# Patient Record
Sex: FEMALE | Race: WHITE | NOT HISPANIC OR LATINO | Employment: OTHER | URBAN - METROPOLITAN AREA
[De-identification: names, ages, dates, MRNs, and addresses within clinical notes are randomized per-mention and may not be internally consistent; named-entity substitution may affect disease eponyms.]

---

## 2023-07-19 ENCOUNTER — APPOINTMENT (OUTPATIENT)
Dept: RADIOLOGY | Facility: MEDICAL CENTER | Age: 68
DRG: 897 | End: 2023-07-19
Attending: EMERGENCY MEDICINE
Payer: OTHER MISCELLANEOUS

## 2023-07-19 ENCOUNTER — HOSPITAL ENCOUNTER (INPATIENT)
Facility: MEDICAL CENTER | Age: 68
LOS: 7 days | DRG: 897 | End: 2023-07-26
Attending: EMERGENCY MEDICINE | Admitting: INTERNAL MEDICINE
Payer: OTHER MISCELLANEOUS

## 2023-07-19 DIAGNOSIS — T39.1X1A TOXIC EFFECT OF ACETAMINOPHEN, ACCIDENTAL OR UNINTENTIONAL, INITIAL ENCOUNTER: ICD-10-CM

## 2023-07-19 DIAGNOSIS — F10.221: ICD-10-CM

## 2023-07-19 DIAGNOSIS — M53.3 COCCYX PAIN: ICD-10-CM

## 2023-07-19 LAB
ALBUMIN SERPL BCP-MCNC: 4.8 G/DL (ref 3.2–4.9)
ALBUMIN/GLOB SERPL: 1.6 G/DL
ALP SERPL-CCNC: 167 U/L (ref 30–99)
ALT SERPL-CCNC: 66 U/L (ref 2–50)
ANION GAP SERPL CALC-SCNC: 17 MMOL/L (ref 7–16)
APAP SERPL-MCNC: <5 UG/ML (ref 10–30)
AST SERPL-CCNC: 150 U/L (ref 12–45)
BASOPHILS # BLD AUTO: 2.4 % (ref 0–1.8)
BASOPHILS # BLD: 0.11 K/UL (ref 0–0.12)
BILIRUB SERPL-MCNC: 0.7 MG/DL (ref 0.1–1.5)
BUN SERPL-MCNC: 12 MG/DL (ref 8–22)
CALCIUM ALBUM COR SERPL-MCNC: 9.6 MG/DL (ref 8.5–10.5)
CALCIUM SERPL-MCNC: 10.2 MG/DL (ref 8.4–10.2)
CHLORIDE SERPL-SCNC: 99 MMOL/L (ref 96–112)
CO2 SERPL-SCNC: 24 MMOL/L (ref 20–33)
CREAT SERPL-MCNC: 0.93 MG/DL (ref 0.5–1.4)
EKG IMPRESSION: NORMAL
EKG IMPRESSION: NORMAL
EOSINOPHIL # BLD AUTO: 0.18 K/UL (ref 0–0.51)
EOSINOPHIL NFR BLD: 3.9 % (ref 0–6.9)
ERYTHROCYTE [DISTWIDTH] IN BLOOD BY AUTOMATED COUNT: 49.5 FL (ref 35.9–50)
GFR SERPLBLD CREATININE-BSD FMLA CKD-EPI: 68 ML/MIN/1.73 M 2
GLOBULIN SER CALC-MCNC: 3 G/DL (ref 1.9–3.5)
GLUCOSE SERPL-MCNC: 104 MG/DL (ref 65–99)
HCT VFR BLD AUTO: 41.1 % (ref 37–47)
HGB BLD-MCNC: 13.6 G/DL (ref 12–16)
IMM GRANULOCYTES # BLD AUTO: 0.01 K/UL (ref 0–0.11)
IMM GRANULOCYTES NFR BLD AUTO: 0.2 % (ref 0–0.9)
INR PPP: 0.89 (ref 0.87–1.13)
LYMPHOCYTES # BLD AUTO: 1.52 K/UL (ref 1–4.8)
LYMPHOCYTES NFR BLD: 33 % (ref 22–41)
MCH RBC QN AUTO: 32.2 PG (ref 27–33)
MCHC RBC AUTO-ENTMCNC: 33.1 G/DL (ref 32.2–35.5)
MCV RBC AUTO: 97.2 FL (ref 81.4–97.8)
MONOCYTES # BLD AUTO: 0.49 K/UL (ref 0–0.85)
MONOCYTES NFR BLD AUTO: 10.7 % (ref 0–13.4)
NEUTROPHILS # BLD AUTO: 2.29 K/UL (ref 1.82–7.42)
NEUTROPHILS NFR BLD: 49.8 % (ref 44–72)
NRBC # BLD AUTO: 0 K/UL
NRBC BLD-RTO: 0 /100 WBC (ref 0–0.2)
PLATELET # BLD AUTO: 337 K/UL (ref 164–446)
PMV BLD AUTO: 10.2 FL (ref 9–12.9)
POTASSIUM SERPL-SCNC: 3.6 MMOL/L (ref 3.6–5.5)
PROT SERPL-MCNC: 7.8 G/DL (ref 6–8.2)
PROTHROMBIN TIME: 12.5 SEC (ref 12–14.6)
RBC # BLD AUTO: 4.23 M/UL (ref 4.2–5.4)
SODIUM SERPL-SCNC: 140 MMOL/L (ref 135–145)
TROPONIN T SERPL-MCNC: 6 NG/L (ref 6–19)
TROPONIN T SERPL-MCNC: <6 NG/L (ref 6–19)
WBC # BLD AUTO: 4.6 K/UL (ref 4.8–10.8)

## 2023-07-19 PROCEDURE — 700117 HCHG RX CONTRAST REV CODE 255: Performed by: EMERGENCY MEDICINE

## 2023-07-19 PROCEDURE — 99285 EMERGENCY DEPT VISIT HI MDM: CPT

## 2023-07-19 PROCEDURE — 71045 X-RAY EXAM CHEST 1 VIEW: CPT

## 2023-07-19 PROCEDURE — 770001 HCHG ROOM/CARE - MED/SURG/GYN PRIV*

## 2023-07-19 PROCEDURE — 96365 THER/PROPH/DIAG IV INF INIT: CPT

## 2023-07-19 PROCEDURE — 94760 N-INVAS EAR/PLS OXIMETRY 1: CPT

## 2023-07-19 PROCEDURE — 93005 ELECTROCARDIOGRAM TRACING: CPT

## 2023-07-19 PROCEDURE — 85025 COMPLETE CBC W/AUTO DIFF WBC: CPT

## 2023-07-19 PROCEDURE — 96375 TX/PRO/DX INJ NEW DRUG ADDON: CPT

## 2023-07-19 PROCEDURE — 85610 PROTHROMBIN TIME: CPT

## 2023-07-19 PROCEDURE — 36415 COLL VENOUS BLD VENIPUNCTURE: CPT

## 2023-07-19 PROCEDURE — 80053 COMPREHEN METABOLIC PANEL: CPT

## 2023-07-19 PROCEDURE — 99223 1ST HOSP IP/OBS HIGH 75: CPT | Mod: AI | Performed by: INTERNAL MEDICINE

## 2023-07-19 PROCEDURE — 700111 HCHG RX REV CODE 636 W/ 250 OVERRIDE (IP): Performed by: EMERGENCY MEDICINE

## 2023-07-19 PROCEDURE — 700101 HCHG RX REV CODE 250: Mod: JZ | Performed by: EMERGENCY MEDICINE

## 2023-07-19 PROCEDURE — 93005 ELECTROCARDIOGRAM TRACING: CPT | Performed by: EMERGENCY MEDICINE

## 2023-07-19 PROCEDURE — 71275 CT ANGIOGRAPHY CHEST: CPT

## 2023-07-19 PROCEDURE — 700105 HCHG RX REV CODE 258: Performed by: EMERGENCY MEDICINE

## 2023-07-19 PROCEDURE — 84484 ASSAY OF TROPONIN QUANT: CPT

## 2023-07-19 PROCEDURE — 76705 ECHO EXAM OF ABDOMEN: CPT

## 2023-07-19 PROCEDURE — 80143 DRUG ASSAY ACETAMINOPHEN: CPT

## 2023-07-19 RX ORDER — DULOXETIN HYDROCHLORIDE 60 MG/1
60 CAPSULE, DELAYED RELEASE ORAL DAILY
Status: ON HOLD | COMMUNITY
End: 2023-08-26

## 2023-07-19 RX ORDER — MORPHINE SULFATE 4 MG/ML
4 INJECTION INTRAVENOUS
Status: DISCONTINUED | OUTPATIENT
Start: 2023-07-19 | End: 2023-07-26 | Stop reason: HOSPADM

## 2023-07-19 RX ORDER — MIRTAZAPINE 30 MG/1
30 TABLET, FILM COATED ORAL
Status: ON HOLD | COMMUNITY
End: 2023-08-29

## 2023-07-19 RX ORDER — POLYETHYLENE GLYCOL 3350 17 G/17G
1 POWDER, FOR SOLUTION ORAL
Status: DISCONTINUED | OUTPATIENT
Start: 2023-07-19 | End: 2023-07-26 | Stop reason: HOSPADM

## 2023-07-19 RX ORDER — OXYCODONE HYDROCHLORIDE 5 MG/1
5 TABLET ORAL
Status: DISCONTINUED | OUTPATIENT
Start: 2023-07-19 | End: 2023-07-26 | Stop reason: HOSPADM

## 2023-07-19 RX ORDER — METOPROLOL SUCCINATE 25 MG/1
25 TABLET, EXTENDED RELEASE ORAL DAILY
Status: DISCONTINUED | OUTPATIENT
Start: 2023-07-20 | End: 2023-07-26 | Stop reason: HOSPADM

## 2023-07-19 RX ORDER — ACETYLCYSTEINE 200 MG/ML
140 SOLUTION ORAL; RESPIRATORY (INHALATION) ONCE
Status: DISCONTINUED | OUTPATIENT
Start: 2023-07-19 | End: 2023-07-19

## 2023-07-19 RX ORDER — ONDANSETRON 4 MG/1
4 TABLET, ORALLY DISINTEGRATING ORAL EVERY 4 HOURS PRN
Status: DISCONTINUED | OUTPATIENT
Start: 2023-07-19 | End: 2023-07-26 | Stop reason: HOSPADM

## 2023-07-19 RX ORDER — DULOXETIN HYDROCHLORIDE 30 MG/1
60 CAPSULE, DELAYED RELEASE ORAL DAILY
Status: DISCONTINUED | OUTPATIENT
Start: 2023-07-20 | End: 2023-07-26 | Stop reason: HOSPADM

## 2023-07-19 RX ORDER — SODIUM CHLORIDE 9 MG/ML
1000 INJECTION, SOLUTION INTRAVENOUS ONCE
Status: COMPLETED | OUTPATIENT
Start: 2023-07-19 | End: 2023-07-19

## 2023-07-19 RX ORDER — BISACODYL 10 MG
10 SUPPOSITORY, RECTAL RECTAL
Status: DISCONTINUED | OUTPATIENT
Start: 2023-07-19 | End: 2023-07-26 | Stop reason: HOSPADM

## 2023-07-19 RX ORDER — ROSUVASTATIN CALCIUM 10 MG/1
20 TABLET, COATED ORAL DAILY
Status: DISCONTINUED | OUTPATIENT
Start: 2023-07-20 | End: 2023-07-20

## 2023-07-19 RX ORDER — METOPROLOL SUCCINATE 25 MG/1
25 TABLET, EXTENDED RELEASE ORAL DAILY
Status: ON HOLD | COMMUNITY
End: 2023-08-29

## 2023-07-19 RX ORDER — LEVETIRACETAM 500 MG/1
500 TABLET ORAL 2 TIMES DAILY
Status: DISCONTINUED | OUTPATIENT
Start: 2023-07-20 | End: 2023-07-26 | Stop reason: HOSPADM

## 2023-07-19 RX ORDER — MORPHINE SULFATE 4 MG/ML
4 INJECTION INTRAVENOUS ONCE
Status: COMPLETED | OUTPATIENT
Start: 2023-07-19 | End: 2023-07-19

## 2023-07-19 RX ORDER — ROSUVASTATIN CALCIUM 20 MG/1
20 TABLET, COATED ORAL DAILY
Status: ON HOLD | COMMUNITY
End: 2023-08-29

## 2023-07-19 RX ORDER — ONDANSETRON 2 MG/ML
4 INJECTION INTRAMUSCULAR; INTRAVENOUS EVERY 4 HOURS PRN
Status: DISCONTINUED | OUTPATIENT
Start: 2023-07-19 | End: 2023-07-26 | Stop reason: HOSPADM

## 2023-07-19 RX ORDER — ONDANSETRON 2 MG/ML
4 INJECTION INTRAMUSCULAR; INTRAVENOUS ONCE
Status: COMPLETED | OUTPATIENT
Start: 2023-07-19 | End: 2023-07-19

## 2023-07-19 RX ORDER — ACETYLCYSTEINE 200 MG/ML
INJECTION INTRAVENOUS
Status: COMPLETED
Start: 2023-07-19 | End: 2023-07-19

## 2023-07-19 RX ORDER — LEVETIRACETAM 500 MG/1
500 TABLET ORAL 2 TIMES DAILY
Status: ON HOLD | COMMUNITY
End: 2023-08-26

## 2023-07-19 RX ORDER — MIRTAZAPINE 15 MG/1
30 TABLET, ORALLY DISINTEGRATING ORAL NIGHTLY
Status: DISCONTINUED | OUTPATIENT
Start: 2023-07-20 | End: 2023-07-26 | Stop reason: HOSPADM

## 2023-07-19 RX ORDER — GABAPENTIN 300 MG/1
300 CAPSULE ORAL DAILY
Status: DISCONTINUED | OUTPATIENT
Start: 2023-07-20 | End: 2023-07-26 | Stop reason: HOSPADM

## 2023-07-19 RX ORDER — OXYCODONE HYDROCHLORIDE 10 MG/1
10 TABLET ORAL
Status: DISCONTINUED | OUTPATIENT
Start: 2023-07-19 | End: 2023-07-26 | Stop reason: HOSPADM

## 2023-07-19 RX ORDER — GABAPENTIN 300 MG/1
300 CAPSULE ORAL DAILY
COMMUNITY
End: 2023-08-15

## 2023-07-19 RX ORDER — AMOXICILLIN 250 MG
2 CAPSULE ORAL 2 TIMES DAILY
Status: DISCONTINUED | OUTPATIENT
Start: 2023-07-19 | End: 2023-07-26 | Stop reason: HOSPADM

## 2023-07-19 RX ORDER — LABETALOL HYDROCHLORIDE 5 MG/ML
10 INJECTION, SOLUTION INTRAVENOUS EVERY 4 HOURS PRN
Status: DISCONTINUED | OUTPATIENT
Start: 2023-07-19 | End: 2023-07-26 | Stop reason: HOSPADM

## 2023-07-19 RX ADMIN — SODIUM CHLORIDE 1000 ML: 9 INJECTION, SOLUTION INTRAVENOUS at 18:03

## 2023-07-19 RX ADMIN — ACETYLCYSTEINE 9160 MG: 6 INJECTION, SOLUTION INTRAVENOUS at 22:34

## 2023-07-19 RX ADMIN — MORPHINE SULFATE 4 MG: 4 INJECTION INTRAVENOUS at 18:04

## 2023-07-19 RX ADMIN — IOHEXOL 75 ML: 350 INJECTION, SOLUTION INTRAVENOUS at 18:44

## 2023-07-19 RX ADMIN — ONDANSETRON 4 MG: 2 INJECTION INTRAMUSCULAR; INTRAVENOUS at 18:04

## 2023-07-19 RX ADMIN — ACETYLCYSTEINE 3060 MG: 6 INJECTION, SOLUTION INTRAVENOUS at 23:42

## 2023-07-19 ASSESSMENT — LIFESTYLE VARIABLES
ON A TYPICAL DAY WHEN YOU DRINK ALCOHOL HOW MANY DRINKS DO YOU HAVE: 8
CONSUMPTION TOTAL: POSITIVE
TOTAL SCORE: 0
ALCOHOL_USE: YES
TOTAL SCORE: 0
TOTAL SCORE: 0
EVER FELT BAD OR GUILTY ABOUT YOUR DRINKING: NO
HAVE PEOPLE ANNOYED YOU BY CRITICIZING YOUR DRINKING: NO
AVERAGE NUMBER OF DAYS PER WEEK YOU HAVE A DRINK CONTAINING ALCOHOL: 7
EVER HAD A DRINK FIRST THING IN THE MORNING TO STEADY YOUR NERVES TO GET RID OF A HANGOVER: NO
HAVE YOU EVER FELT YOU SHOULD CUT DOWN ON YOUR DRINKING: NO
HOW MANY TIMES IN THE PAST YEAR HAVE YOU HAD 5 OR MORE DRINKS IN A DAY: 0

## 2023-07-19 ASSESSMENT — PATIENT HEALTH QUESTIONNAIRE - PHQ9
6. FEELING BAD ABOUT YOURSELF - OR THAT YOU ARE A FAILURE OR HAVE LET YOURSELF OR YOUR FAMILY DOWN: NOT AL ALL
8. MOVING OR SPEAKING SO SLOWLY THAT OTHER PEOPLE COULD HAVE NOTICED. OR THE OPPOSITE, BEING SO FIGETY OR RESTLESS THAT YOU HAVE BEEN MOVING AROUND A LOT MORE THAN USUAL: NOT AT ALL
9. THOUGHTS THAT YOU WOULD BE BETTER OFF DEAD, OR OF HURTING YOURSELF: NOT AT ALL
3. TROUBLE FALLING OR STAYING ASLEEP OR SLEEPING TOO MUCH: NEARLY EVERY DAY
2. FEELING DOWN, DEPRESSED, IRRITABLE, OR HOPELESS: SEVERAL DAYS
SUM OF ALL RESPONSES TO PHQ QUESTIONS 1-9: 8
7. TROUBLE CONCENTRATING ON THINGS, SUCH AS READING THE NEWSPAPER OR WATCHING TELEVISION: NOT AT ALL
4. FEELING TIRED OR HAVING LITTLE ENERGY: NEARLY EVERY DAY
1. LITTLE INTEREST OR PLEASURE IN DOING THINGS: NOT AT ALL
5. POOR APPETITE OR OVEREATING: SEVERAL DAYS
SUM OF ALL RESPONSES TO PHQ9 QUESTIONS 1 AND 2: 1

## 2023-07-19 ASSESSMENT — HEART SCORE
HISTORY: SLIGHTLY SUSPICIOUS
ECG: NON-SPECIFIC REPOLARIZATION DISTURBANCE
HEART SCORE: 4
TROPONIN: LESS THAN OR EQUAL TO NORMAL LIMIT
AGE: 65+
RISK FACTORS: 1-2 RISK FACTORS

## 2023-07-19 ASSESSMENT — ENCOUNTER SYMPTOMS
FEVER: 0
VOMITING: 0
LOSS OF CONSCIOUSNESS: 0
DIZZINESS: 0
DIARRHEA: 0
WEAKNESS: 0
CONSTIPATION: 0
DEPRESSION: 0
ABDOMINAL PAIN: 0
NAUSEA: 0
STRIDOR: 0
CHILLS: 0
TINGLING: 0
FALLS: 1
HEADACHES: 0
SHORTNESS OF BREATH: 0
SPUTUM PRODUCTION: 0
PALPITATIONS: 0
COUGH: 0
MYALGIAS: 0

## 2023-07-19 ASSESSMENT — PAIN DESCRIPTION - PAIN TYPE: TYPE: ACUTE PAIN

## 2023-07-20 PROBLEM — R74.8 ELEVATED LIVER ENZYMES: Status: ACTIVE | Noted: 2023-07-20

## 2023-07-20 PROBLEM — E78.5 DYSLIPIDEMIA: Status: ACTIVE | Noted: 2023-07-20

## 2023-07-20 PROBLEM — F10.10 ALCOHOL ABUSE: Status: ACTIVE | Noted: 2023-07-20

## 2023-07-20 PROBLEM — R56.9 SEIZURES (HCC): Status: ACTIVE | Noted: 2023-07-20

## 2023-07-20 PROBLEM — F41.9 ANXIETY AND DEPRESSION: Status: ACTIVE | Noted: 2023-07-20

## 2023-07-20 PROBLEM — F32.A ANXIETY AND DEPRESSION: Status: ACTIVE | Noted: 2023-07-20

## 2023-07-20 LAB
ALBUMIN SERPL BCP-MCNC: 3.9 G/DL (ref 3.2–4.9)
ALBUMIN SERPL BCP-MCNC: 4.2 G/DL (ref 3.2–4.9)
ALBUMIN/GLOB SERPL: 1.6 G/DL
ALBUMIN/GLOB SERPL: 1.6 G/DL
ALP SERPL-CCNC: 127 U/L (ref 30–99)
ALP SERPL-CCNC: 136 U/L (ref 30–99)
ALT SERPL-CCNC: 52 U/L (ref 2–50)
ALT SERPL-CCNC: 53 U/L (ref 2–50)
ANION GAP SERPL CALC-SCNC: 10 MMOL/L (ref 7–16)
ANION GAP SERPL CALC-SCNC: 16 MMOL/L (ref 7–16)
APAP SERPL-MCNC: <5 UG/ML (ref 10–30)
APAP SERPL-MCNC: <5 UG/ML (ref 10–30)
AST SERPL-CCNC: 109 U/L (ref 12–45)
AST SERPL-CCNC: 97 U/L (ref 12–45)
BILIRUB SERPL-MCNC: 0.4 MG/DL (ref 0.1–1.5)
BILIRUB SERPL-MCNC: 0.8 MG/DL (ref 0.1–1.5)
BUN SERPL-MCNC: 10 MG/DL (ref 8–22)
BUN SERPL-MCNC: 10 MG/DL (ref 8–22)
CALCIUM ALBUM COR SERPL-MCNC: 8.9 MG/DL (ref 8.5–10.5)
CALCIUM ALBUM COR SERPL-MCNC: 9.2 MG/DL (ref 8.5–10.5)
CALCIUM SERPL-MCNC: 9.1 MG/DL (ref 8.4–10.2)
CALCIUM SERPL-MCNC: 9.1 MG/DL (ref 8.4–10.2)
CHLORIDE SERPL-SCNC: 100 MMOL/L (ref 96–112)
CHLORIDE SERPL-SCNC: 98 MMOL/L (ref 96–112)
CO2 SERPL-SCNC: 24 MMOL/L (ref 20–33)
CO2 SERPL-SCNC: 28 MMOL/L (ref 20–33)
CREAT SERPL-MCNC: 0.58 MG/DL (ref 0.5–1.4)
CREAT SERPL-MCNC: 0.65 MG/DL (ref 0.5–1.4)
ERYTHROCYTE [DISTWIDTH] IN BLOOD BY AUTOMATED COUNT: 49.7 FL (ref 35.9–50)
ETHANOL BLD-MCNC: 43.6 MG/DL
GFR SERPLBLD CREATININE-BSD FMLA CKD-EPI: 100 ML/MIN/1.73 M 2
GFR SERPLBLD CREATININE-BSD FMLA CKD-EPI: 97 ML/MIN/1.73 M 2
GLOBULIN SER CALC-MCNC: 2.4 G/DL (ref 1.9–3.5)
GLOBULIN SER CALC-MCNC: 2.7 G/DL (ref 1.9–3.5)
GLUCOSE SERPL-MCNC: 130 MG/DL (ref 65–99)
GLUCOSE SERPL-MCNC: 131 MG/DL (ref 65–99)
HCT VFR BLD AUTO: 39.1 % (ref 37–47)
HGB BLD-MCNC: 12.8 G/DL (ref 12–16)
INR PPP: 1.14 (ref 0.87–1.13)
MCH RBC QN AUTO: 32.2 PG (ref 27–33)
MCHC RBC AUTO-ENTMCNC: 32.7 G/DL (ref 32.2–35.5)
MCV RBC AUTO: 98.2 FL (ref 81.4–97.8)
PLATELET # BLD AUTO: 277 K/UL (ref 164–446)
PMV BLD AUTO: 10.5 FL (ref 9–12.9)
POTASSIUM SERPL-SCNC: 3.6 MMOL/L (ref 3.6–5.5)
POTASSIUM SERPL-SCNC: 4.6 MMOL/L (ref 3.6–5.5)
PROT SERPL-MCNC: 6.3 G/DL (ref 6–8.2)
PROT SERPL-MCNC: 6.9 G/DL (ref 6–8.2)
PROTHROMBIN TIME: 15.2 SEC (ref 12–14.6)
RBC # BLD AUTO: 3.98 M/UL (ref 4.2–5.4)
SODIUM SERPL-SCNC: 136 MMOL/L (ref 135–145)
SODIUM SERPL-SCNC: 140 MMOL/L (ref 135–145)
WBC # BLD AUTO: 5.5 K/UL (ref 4.8–10.8)

## 2023-07-20 PROCEDURE — 80143 DRUG ASSAY ACETAMINOPHEN: CPT

## 2023-07-20 PROCEDURE — A9270 NON-COVERED ITEM OR SERVICE: HCPCS | Performed by: FAMILY MEDICINE

## 2023-07-20 PROCEDURE — 82077 ASSAY SPEC XCP UR&BREATH IA: CPT

## 2023-07-20 PROCEDURE — 36415 COLL VENOUS BLD VENIPUNCTURE: CPT

## 2023-07-20 PROCEDURE — 700102 HCHG RX REV CODE 250 W/ 637 OVERRIDE(OP): Performed by: INTERNAL MEDICINE

## 2023-07-20 PROCEDURE — 700105 HCHG RX REV CODE 258: Mod: JZ | Performed by: FAMILY MEDICINE

## 2023-07-20 PROCEDURE — 770001 HCHG ROOM/CARE - MED/SURG/GYN PRIV*

## 2023-07-20 PROCEDURE — 99233 SBSQ HOSP IP/OBS HIGH 50: CPT | Performed by: FAMILY MEDICINE

## 2023-07-20 PROCEDURE — 700111 HCHG RX REV CODE 636 W/ 250 OVERRIDE (IP): Performed by: INTERNAL MEDICINE

## 2023-07-20 PROCEDURE — 700101 HCHG RX REV CODE 250: Performed by: EMERGENCY MEDICINE

## 2023-07-20 PROCEDURE — A9270 NON-COVERED ITEM OR SERVICE: HCPCS | Performed by: INTERNAL MEDICINE

## 2023-07-20 PROCEDURE — 85027 COMPLETE CBC AUTOMATED: CPT

## 2023-07-20 PROCEDURE — 700105 HCHG RX REV CODE 258: Performed by: EMERGENCY MEDICINE

## 2023-07-20 PROCEDURE — 700102 HCHG RX REV CODE 250 W/ 637 OVERRIDE(OP): Performed by: FAMILY MEDICINE

## 2023-07-20 PROCEDURE — 93005 ELECTROCARDIOGRAM TRACING: CPT

## 2023-07-20 PROCEDURE — 94760 N-INVAS EAR/PLS OXIMETRY 1: CPT

## 2023-07-20 PROCEDURE — 80053 COMPREHEN METABOLIC PANEL: CPT

## 2023-07-20 PROCEDURE — 85610 PROTHROMBIN TIME: CPT

## 2023-07-20 RX ORDER — LORAZEPAM 2 MG/ML
0.5 INJECTION INTRAMUSCULAR EVERY 4 HOURS PRN
Status: DISCONTINUED | OUTPATIENT
Start: 2023-07-20 | End: 2023-07-23

## 2023-07-20 RX ORDER — LORAZEPAM 2 MG/ML
1 INJECTION INTRAMUSCULAR
Status: DISCONTINUED | OUTPATIENT
Start: 2023-07-20 | End: 2023-07-23

## 2023-07-20 RX ORDER — LORAZEPAM 0.5 MG/1
0.5 TABLET ORAL EVERY 4 HOURS PRN
Status: DISCONTINUED | OUTPATIENT
Start: 2023-07-20 | End: 2023-07-23

## 2023-07-20 RX ORDER — ECHINACEA PURPUREA EXTRACT 125 MG
2 TABLET ORAL
Status: DISCONTINUED | OUTPATIENT
Start: 2023-07-20 | End: 2023-07-26 | Stop reason: HOSPADM

## 2023-07-20 RX ORDER — LORAZEPAM 1 MG/1
2 TABLET ORAL
Status: DISCONTINUED | OUTPATIENT
Start: 2023-07-20 | End: 2023-07-23

## 2023-07-20 RX ORDER — LORAZEPAM 1 MG/1
1 TABLET ORAL EVERY 4 HOURS PRN
Status: DISCONTINUED | OUTPATIENT
Start: 2023-07-20 | End: 2023-07-23

## 2023-07-20 RX ORDER — FOLIC ACID 1 MG/1
1 TABLET ORAL DAILY
Status: DISCONTINUED | OUTPATIENT
Start: 2023-07-20 | End: 2023-07-26 | Stop reason: HOSPADM

## 2023-07-20 RX ORDER — LORAZEPAM 1 MG/1
4 TABLET ORAL
Status: DISCONTINUED | OUTPATIENT
Start: 2023-07-20 | End: 2023-07-23

## 2023-07-20 RX ORDER — SODIUM CHLORIDE, SODIUM LACTATE, POTASSIUM CHLORIDE, CALCIUM CHLORIDE 600; 310; 30; 20 MG/100ML; MG/100ML; MG/100ML; MG/100ML
INJECTION, SOLUTION INTRAVENOUS CONTINUOUS
Status: DISCONTINUED | OUTPATIENT
Start: 2023-07-20 | End: 2023-07-26 | Stop reason: HOSPADM

## 2023-07-20 RX ORDER — GAUZE BANDAGE 2" X 2"
100 BANDAGE TOPICAL DAILY
Status: DISCONTINUED | OUTPATIENT
Start: 2023-07-20 | End: 2023-07-26 | Stop reason: HOSPADM

## 2023-07-20 RX ORDER — LORAZEPAM 2 MG/ML
0.5 INJECTION INTRAMUSCULAR EVERY 4 HOURS PRN
Status: DISCONTINUED | OUTPATIENT
Start: 2023-07-20 | End: 2023-07-20

## 2023-07-20 RX ORDER — LORAZEPAM 1 MG/1
3 TABLET ORAL
Status: DISCONTINUED | OUTPATIENT
Start: 2023-07-20 | End: 2023-07-23

## 2023-07-20 RX ORDER — LORAZEPAM 2 MG/ML
1.5 INJECTION INTRAMUSCULAR
Status: DISCONTINUED | OUTPATIENT
Start: 2023-07-20 | End: 2023-07-23

## 2023-07-20 RX ORDER — LORAZEPAM 2 MG/ML
2 INJECTION INTRAMUSCULAR
Status: DISCONTINUED | OUTPATIENT
Start: 2023-07-20 | End: 2023-07-23

## 2023-07-20 RX ADMIN — LORAZEPAM 0.5 MG: 0.5 TABLET ORAL at 18:49

## 2023-07-20 RX ADMIN — LORAZEPAM 0.5 MG: 0.5 TABLET ORAL at 09:08

## 2023-07-20 RX ADMIN — OXYCODONE 5 MG: 5 TABLET ORAL at 18:00

## 2023-07-20 RX ADMIN — SENNOSIDES AND DOCUSATE SODIUM 2 TABLET: 50; 8.6 TABLET ORAL at 18:00

## 2023-07-20 RX ADMIN — RIVAROXABAN 10 MG: 10 TABLET, FILM COATED ORAL at 18:00

## 2023-07-20 RX ADMIN — MIRTAZAPINE 30 MG: 15 TABLET, ORALLY DISINTEGRATING ORAL at 00:18

## 2023-07-20 RX ADMIN — LEVETIRACETAM 500 MG: 500 TABLET, FILM COATED ORAL at 18:00

## 2023-07-20 RX ADMIN — GABAPENTIN 300 MG: 300 CAPSULE ORAL at 05:18

## 2023-07-20 RX ADMIN — THIAMINE HCL TAB 100 MG 100 MG: 100 TAB at 05:18

## 2023-07-20 RX ADMIN — LORAZEPAM 0.5 MG: 0.5 TABLET ORAL at 14:00

## 2023-07-20 RX ADMIN — OXYCODONE HYDROCHLORIDE 10 MG: 10 TABLET ORAL at 01:29

## 2023-07-20 RX ADMIN — SENNOSIDES AND DOCUSATE SODIUM 2 TABLET: 50; 8.6 TABLET ORAL at 05:17

## 2023-07-20 RX ADMIN — LEVETIRACETAM 500 MG: 500 TABLET, FILM COATED ORAL at 00:18

## 2023-07-20 RX ADMIN — RIVAROXABAN 10 MG: 10 TABLET, FILM COATED ORAL at 00:18

## 2023-07-20 RX ADMIN — METOPROLOL SUCCINATE 25 MG: 25 TABLET, EXTENDED RELEASE ORAL at 05:18

## 2023-07-20 RX ADMIN — LORAZEPAM 2 MG: 1 TABLET ORAL at 22:17

## 2023-07-20 RX ADMIN — SENNOSIDES AND DOCUSATE SODIUM 2 TABLET: 50; 8.6 TABLET ORAL at 00:17

## 2023-07-20 RX ADMIN — FOLIC ACID 1 MG: 1 TABLET ORAL at 05:18

## 2023-07-20 RX ADMIN — MIRTAZAPINE 30 MG: 15 TABLET, ORALLY DISINTEGRATING ORAL at 21:15

## 2023-07-20 RX ADMIN — ROSUVASTATIN 20 MG: 10 TABLET, FILM COATED ORAL at 05:18

## 2023-07-20 RX ADMIN — MORPHINE SULFATE 4 MG: 4 INJECTION INTRAVENOUS at 19:28

## 2023-07-20 RX ADMIN — SODIUM CHLORIDE, POTASSIUM CHLORIDE, SODIUM LACTATE AND CALCIUM CHLORIDE: 600; 310; 30; 20 INJECTION, SOLUTION INTRAVENOUS at 19:26

## 2023-07-20 RX ADMIN — DULOXETINE HYDROCHLORIDE 60 MG: 30 CAPSULE, DELAYED RELEASE ORAL at 05:18

## 2023-07-20 RX ADMIN — ACETYLCYSTEINE 6.25 MG/KG/HR: 6 INJECTION, SOLUTION INTRAVENOUS at 04:09

## 2023-07-20 RX ADMIN — THERA TABS 1 TABLET: TAB at 05:18

## 2023-07-20 RX ADMIN — OXYCODONE 5 MG: 5 TABLET ORAL at 23:10

## 2023-07-20 RX ADMIN — ACETYLCYSTEINE 6.25 MG/KG/HR: 6 INJECTION, SOLUTION INTRAVENOUS at 21:19

## 2023-07-20 ASSESSMENT — LIFESTYLE VARIABLES
PAROXYSMAL SWEATS: NO SWEAT VISIBLE
NAUSEA AND VOMITING: NO NAUSEA AND NO VOMITING
VISUAL DISTURBANCES: NOT PRESENT
VISUAL DISTURBANCES: NOT PRESENT
ANXIETY: MILDLY ANXIOUS
TREMOR: MODERATE TREMOR WITH ARMS EXTENDED
NAUSEA AND VOMITING: NO NAUSEA AND NO VOMITING
HEADACHE, FULLNESS IN HEAD: VERY MILD
ANXIETY: NO ANXIETY (AT EASE)
TOTAL SCORE: 6
NAUSEA AND VOMITING: NO NAUSEA AND NO VOMITING
ORIENTATION AND CLOUDING OF SENSORIUM: ORIENTED AND CAN DO SERIAL ADDITIONS
HEADACHE, FULLNESS IN HEAD: NOT PRESENT
PAROXYSMAL SWEATS: *
ORIENTATION AND CLOUDING OF SENSORIUM: ORIENTED AND CAN DO SERIAL ADDITIONS
AGITATION: NORMAL ACTIVITY
ANXIETY: MILDLY ANXIOUS
VISUAL DISTURBANCES: NOT PRESENT
AGITATION: NORMAL ACTIVITY
TOTAL SCORE: 12
TREMOR: MODERATE TREMOR WITH ARMS EXTENDED
AGITATION: NORMAL ACTIVITY
PAROXYSMAL SWEATS: NO SWEAT VISIBLE
ORIENTATION AND CLOUDING OF SENSORIUM: ORIENTED AND CAN DO SERIAL ADDITIONS
TOTAL SCORE: 6
AGITATION: NORMAL ACTIVITY
HEADACHE, FULLNESS IN HEAD: NOT PRESENT
AGITATION: NORMAL ACTIVITY
TOTAL SCORE: 6
ANXIETY: MILDLY ANXIOUS
TOTAL SCORE: 5
TREMOR: MODERATE TREMOR WITH ARMS EXTENDED
TREMOR: *
NAUSEA AND VOMITING: NO NAUSEA AND NO VOMITING
HEADACHE, FULLNESS IN HEAD: MODERATELY SEVERE
AUDITORY DISTURBANCES: NOT PRESENT
TREMOR: MODERATE TREMOR WITH ARMS EXTENDED
PAROXYSMAL SWEATS: NO SWEAT VISIBLE
ANXIETY: MILDLY ANXIOUS
PAROXYSMAL SWEATS: NO SWEAT VISIBLE
ORIENTATION AND CLOUDING OF SENSORIUM: ORIENTED AND CAN DO SERIAL ADDITIONS
ANXIETY: MILDLY ANXIOUS
NAUSEA AND VOMITING: NO NAUSEA AND NO VOMITING
AUDITORY DISTURBANCES: NOT PRESENT
HEADACHE, FULLNESS IN HEAD: VERY MILD
NAUSEA AND VOMITING: NO NAUSEA AND NO VOMITING
AUDITORY DISTURBANCES: NOT PRESENT
AUDITORY DISTURBANCES: NOT PRESENT
PAROXYSMAL SWEATS: NO SWEAT VISIBLE
AUDITORY DISTURBANCES: NOT PRESENT
AUDITORY DISTURBANCES: NOT PRESENT
VISUAL DISTURBANCES: NOT PRESENT
TOTAL SCORE: 0
AGITATION: NORMAL ACTIVITY
TREMOR: NO TREMOR
HEADACHE, FULLNESS IN HEAD: VERY MILD
VISUAL DISTURBANCES: NOT PRESENT
VISUAL DISTURBANCES: NOT PRESENT

## 2023-07-20 ASSESSMENT — PAIN DESCRIPTION - PAIN TYPE
TYPE: ACUTE PAIN

## 2023-07-20 ASSESSMENT — PATIENT HEALTH QUESTIONNAIRE - PHQ9
3. TROUBLE FALLING OR STAYING ASLEEP OR SLEEPING TOO MUCH: NOT AT ALL
4. FEELING TIRED OR HAVING LITTLE ENERGY: NOT AT ALL
SUM OF ALL RESPONSES TO PHQ9 QUESTIONS 1 AND 2: 0
5. POOR APPETITE OR OVEREATING: NOT AT ALL
1. LITTLE INTEREST OR PLEASURE IN DOING THINGS: NOT AT ALL
2. FEELING DOWN, DEPRESSED, IRRITABLE, OR HOPELESS: NOT AT ALL

## 2023-07-20 ASSESSMENT — COGNITIVE AND FUNCTIONAL STATUS - GENERAL
SUGGESTED CMS G CODE MODIFIER MOBILITY: CH
SUGGESTED CMS G CODE MODIFIER DAILY ACTIVITY: CH
MOBILITY SCORE: 24
DAILY ACTIVITIY SCORE: 24

## 2023-07-20 ASSESSMENT — ENCOUNTER SYMPTOMS: NERVOUS/ANXIOUS: 1

## 2023-07-20 NOTE — PROGRESS NOTES
"Hospital Medicine Daily Progress Note    Date of Service  7/20/2023    Chief Complaint  Odalys Keenan is a 67 y.o. female admitted 7/19/2023 with chest and arm pain    Hospital Course  This is a 66yo female with a history of seizure disorder, anklyosing spondylitis and Crohn's disease who presented to hospital with chest pain and tailbone pain - she had apparently recently broken her coccyx.  She had been taking high dose Tylenol and drinking alcohol to help with the pain - namely, approximately 8gm of Tylenol daily recently.  In the ER, she was noted to have mildly elevated LFTs and her Tylenol level was <5. Troponins were negative, and CTA did not show PTE. Poison control recommended NAC.    Interval Problem Update  7/20 - Repeat Tylenol level negative, LFTs down. Discussed with patient who states she was taking 4gm of Tylenol daily, and that she took 8gm daily x 4 days back in June when she first broke her tailbone. Pharmacy called Poison Control back with that information, they said we can stop NAC when her LFTs are back to \"her normal level\". Since we have no previous labs for comparison, will continue NAC until her LFTs plateau. LFTs are unlikely to return to normal levels given her 6-10 drinks daily.      Chest pain likely secondary to MSK pain, has since resolved. Alcohol level not obtained on admission, will check. Will start CIWA - initial score of 6, last drink 7/19 am.  Add seizure precautions. Pt does attest to wanting to quit drinking.     I have discussed this patient's plan of care and discharge plan at IDT rounds today with Case Management, Nursing, Nursing leadership, and other members of the IDT team.    Consultants/Specialty  none    Code Status  Full Code    Disposition    Anticipate discharge to: home with organized home healthcare and close outpatient follow-up    I have placed the appropriate orders for post-discharge needs.    Review of Systems  ROS     Physical Exam  Temp:  [36 °C " (96.8 °F)-36.8 °C (98.2 °F)] 36.6 °C (97.9 °F)  Pulse:  [] 94  Resp:  [15-40] 15  BP: (107-150)/(74-95) 126/77  SpO2:  [92 %-100 %] 92 %    Physical Exam  Vitals and nursing note reviewed.   Constitutional:       Appearance: Normal appearance.      Comments: Tremulous     HENT:      Head: Normocephalic and atraumatic.      Mouth/Throat:      Mouth: Mucous membranes are moist.   Cardiovascular:      Rate and Rhythm: Normal rate and regular rhythm.   Pulmonary:      Effort: Pulmonary effort is normal. No respiratory distress.      Breath sounds: Normal breath sounds.   Abdominal:      General: Abdomen is flat. Bowel sounds are normal.      Palpations: Abdomen is soft.   Musculoskeletal:         General: No swelling.   Skin:     General: Skin is warm and dry.   Neurological:      General: No focal deficit present.      Mental Status: She is alert and oriented to person, place, and time. Mental status is at baseline.   Psychiatric:         Mood and Affect: Mood normal.         Behavior: Behavior normal.         Fluids    Intake/Output Summary (Last 24 hours) at 7/20/2023 0708  Last data filed at 7/20/2023 0040  Gross per 24 hour   Intake 360 ml   Output --   Net 360 ml       Laboratory  Recent Labs     07/19/23  1731 07/20/23  0358   WBC 4.6* 5.5   RBC 4.23 3.98*   HEMOGLOBIN 13.6 12.8   HEMATOCRIT 41.1 39.1   MCV 97.2 98.2*   MCH 32.2 32.2   MCHC 33.1 32.7   RDW 49.5 49.7   PLATELETCT 337 277   MPV 10.2 10.5     Recent Labs     07/19/23  1731 07/20/23  0358   SODIUM 140 140   POTASSIUM 3.6 3.6   CHLORIDE 99 100   CO2 24 24   GLUCOSE 104* 130*   BUN 12 10   CREATININE 0.93 0.65   CALCIUM 10.2 9.1     Recent Labs     07/19/23  1737   INR 0.89               Imaging  US-RUQ   Final Result      1.  Hepatic steatosis   2.  Minimally distended gallbladder but no signs of acute inflammation and no cholelithiasis. This is of doubtful clinical significance.      CT-CTA CHEST PULMONARY ARTERY W/ RECONS   Final Result       1.  There is no CT evidence of acute pulmonary embolism.   2.  No pneumothorax, pneumonia or pleural effusion.   3.  Small peripheral micronodules likely postinflammatory. No follow-up imaging is indicated per Fleischner guideline.   4.  Hepatic steatosis.            DX-CHEST-PORTABLE (1 VIEW)   Final Result      1.  There is no acute cardiopulmonary process.           Assessment/Plan  * Tylenol overdose, accidental or unintentional, initial encounter- (present on admission)  Assessment & Plan  - Pt was taking 4gm of Tylenol daily, took 8gm daily for four days in June per patient   - Poison control recommending NAC despite Tylenol levels <5 x 2, recommending to continue until LFTs are back to normal but I do not expect pt's LFTs to return to normal levels given her 6-10 drinks daily.  Will continue NAC until her LFTs plateau.     Dyslipidemia- (present on admission)  Assessment & Plan  Hold statin    Seizures (HCC)- (present on admission)  Assessment & Plan  Continue home Keppra    Anxiety and depression- (present on admission)  Assessment & Plan  Continue home Cymbalta and Remeron   start as needed Ativan    Elevated liver enzymes- (present on admission)  Assessment & Plan  Possibly multifactorial in patient who is taking significant mount of Tylenol and is drinking significant mount of alcohol  Tylenol level is negative x 2, this appears to be almost all EtOH related   Certainly could be chronic, no additional labs in our system  Repeat CMP in the morning    Alcohol abuse- (present on admission)  Assessment & Plan  Pt drinks 6-10 shots daily per pt  Does wish to quit  CIWA, pt tremulous this morning  EtOH positive this am         VTE prophylaxis: SCDs/TEDs and Xarelto 10 mg daily as prophylaxis    I have performed a physical exam and reviewed and updated ROS and Plan today (7/20/2023). In review of yesterday's note (7/19/2023), there are no changes except as documented above.

## 2023-07-20 NOTE — PROGRESS NOTES
0700 assisted pt to the bathroom.    0923 pt had breakfast.     1100 patient sleeping. Equal rise and fall of chest noted.    1300 pt having lunch    1500 pt. Watching TV    1700 assisted pt to the bathroom

## 2023-07-20 NOTE — ASSESSMENT & PLAN NOTE
Patient initially admitted to the intensive care unit secondary to need of Precedex for her alcohol withdrawal  She has been successfully weaned from Precedex and now remains on Librium  Withdrawal symptoms drastically improved.

## 2023-07-20 NOTE — PROGRESS NOTES
Poison control case #:[id number]    Pharmacy Note: Poison control updated for a chronic acetaminophen overdose.     The patient reports taking 4 g of acetaminophen PO daily x 2 years, and ~8 g PO daily the last 4 days.     Labs:  Recent Labs     07/19/23  1731 07/19/23  1737   SODIUM 140  --    POTASSIUM 3.6  --    CHLORIDE 99  --    CO2 24  --    BUN 12  --    CREATININE 0.93  --    GLUCOSE 104*  --    CALCIUM 10.2  --    ASTSGOT 150*  --    ALTSGPT 66*  --    ALBUMIN 4.8  --    TBILIRUBIN 0.7  --    INR  --  0.89         Latest Reference Range & Units 07/19/23 20:36   Acetaminophen -Tylenol 10.0 - 30.0 ug/mL <5.0 (L)     Recommended Plan Per Poison Control:    Start Mucomyst and continue until LFTs are WNL, SCr and INR remain less than 2 and the patient is clinically well. Obtain repeat CMP, INR and acetaminophen levels ~3h prior to end of 3rd Mucomyst bag.      Coleen England, PharmD, BCPS

## 2023-07-20 NOTE — PROGRESS NOTES
Rounding     2324 Patient arrived to unit  0028 water, snack   0210 Patient sleeping   0428 Vitals check   0608 Patient sleeping

## 2023-07-20 NOTE — CARE PLAN
The patient is Stable - Low risk of patient condition declining or worsening    Shift Goals  Clinical Goals: Pt will be able to control pain and report pain less than 5/10. Pt will remain free from falls or injury throughout the shift. Continue Mucomyst as ordered.  Patient Goals: sleep, comfort and rest  Family Goals: n/a    Progress made toward(s) clinical / shift goals:  Pt required 1 prn pain medication within the shift. Pt states relief post intervention. Pt is free from falls or injury throughout the shift. Pt seen sleeping; easily aroused. Hourly rounding in progress.    Patient is not progressing towards the following goals: n/a

## 2023-07-20 NOTE — PROGRESS NOTES
BSSR received from night RN. Pt is sitting up in bed eating breakfast, not in any distress on RA at 91%. Pt denies any pain, N/V or sweating. CIWA protocol in place. Fall risk precaution in place, bed alarm on and call light within reach. All needs met at this time. Hourly rounding in place.     0827: Assisted pt to bathroom using FWW.  0855: CIWA scored at 6.  0910: Ativan 0.5 mg tablet given.  1120: Pt is sleeping, pulse ox in place at 94%, 1 lpm. Noted chest rise and fall.   1200: Pt eating lunch, not in any distress. Vidhya.  1305: Checked on pt, no other needs at this time.   1409: Ativan given. Assisted pt to bathroom using FWW, vidhya with tremors.  1530: Checked on pt, no other needs at this time.  1800: Pt c/o headache and back pain, medicated per MAR.

## 2023-07-20 NOTE — ASSESSMENT & PLAN NOTE
- Pt was taking 4gm of Tylenol daily, took 8gm daily for four days in June per patient   N-acetylcysteine was given per poison control recommendations   AST ALT are normal

## 2023-07-20 NOTE — ED TRIAGE NOTES
"Chief Complaint   Patient presents with    Chest Pain     With shortness of breath for several days per pt    Arm Pain     Left arm, neck, and body pain       BP (!) 129/95   Pulse (!) 111   Temp 36 °C (96.8 °F) (Temporal)   Resp 18   Ht 1.727 m (5' 8\")   Wt 61 kg (134 lb 7.7 oz)   SpO2 100%   BMI 20.45 kg/m²     Pt is clutching chest and diaphoretic in triage. Charge aware of pt.   "

## 2023-07-20 NOTE — CARE PLAN
The patient is Stable - Low risk of patient condition declining or worsening    Shift Goals  Clinical Goals: Pt's CIWA will be monitored, remain free from fall during this shift  Patient Goals: Able to rest comfortably  Family Goals: n/a    Progress made toward(s) clinical / shift goals:  CIWA protocol in place, scored 6. Ativan given. Pt did not sustain any fall during this shift.    Patient is not progressing towards the following goals:

## 2023-07-20 NOTE — PROGRESS NOTES
Received report from ED nurse on duty.  Pt arrived via gurney, admitted to room 114 from ED at 2312. Pt is awake and oriented x4; not in respiratory distress.  Pt received on room air; saturation  dropped to 85-87% then, hooked to oxygen at 1 L/min via nasal cannula.   Oriented to room call light.   Reviewed plan of care with the patient.   Pt complains of pain at 9/10 pain scale especially on tailbone; awaiting due of prn pain medication. Will medicate pt once medications are verified. Nonpharmacologic interventions provided.  Needs attended well.   Fall precaution in place. Bed locked. Bet at lowest position. Call light within reach. Encouraged pt the importance to call for assistance.   Hourly rounding in progress.     2243 Received call from Poison control staff, Tiki and asked if pt tolerating Mucomyst. This RN replied that pt is tolerating and 2nd bag is just started.   Tiki also informed: Stop Mucomyst when all criteria is met:   Tylenol result is 0.   Liver function panel on normal limits.  Creatinine and INR is below 2.  Pt is clinically well.     Poison control phone number if needed: [phone]    0014 Scheduled medications given as per MAR.   Pt states she is okay without the oxygen because it feels dry. Saturation is 92% at room air this time. Pulse oximeter on and maintained. Hourly rounding in progress.     0129 Pt complains of pain at 9/10; prn pain medication given as per MAR. BP checked prior to medication administration.    0230 Pain reassessed. Pt states relief.    0409 Continuous Mucomyst started as per MAR. Pt resting with eyes closed on bed on comfortable position.    0518 Scheduled medications given as per MAR.     0715 Report given to Jacinda HARRY.

## 2023-07-20 NOTE — PROGRESS NOTES
4 Eyes Skin Assessment Completed by PIERO Araujo and PIERO Infante.    Head WDL  Ears WDL  Nose WDL  Mouth WDL  Neck WDL  Breast/Chest WDL  Shoulder Blades WDL  Spine WDL  (R) Arm/Elbow/Hand WDL  (L) Arm/Elbow/Hand WDL  Abdomen WDL  Groin WDL  Scrotum/Coccyx/Buttocks Bruising on left butt cheek  (R) Leg WDL  (L) Leg WDL  (R) Heel/Foot/Toe WDL  (L) Heel/Foot/Toe WDL          Devices In Places Blood Pressure Cuff and Pulse Ox      Interventions In Place NC W/Ear Foams, Sacral Mepilex, and Pillows    Possible Skin Injury No    Pictures Uploaded Into Epic N/A  Wound Consult Placed N/A  RN Wound Prevention Protocol Ordered No

## 2023-07-20 NOTE — ED PROVIDER NOTES
" ED PHYSICIAN NOTE    CHIEF COMPLAINT  Chief Complaint   Patient presents with    Chest Pain     With shortness of breath for several days per pt    Arm Pain     Left arm, neck, and body pain         HPI/ROS    Odalys Keenan is a 67 y.o. female who presents with chest pain and shortness of breath.  Start about 4 days ago.  Constant.  Thought it might be easing off but it returned.  The pain radiates to the left shoulder and back.  Is much worse when she moves or bends or lifts her arm.  No pleuritic pain or hemoptysis.  Has not had a fever.  Has had some sweats.  No nausea vomiting abdominal pain.  She states that she had a fall at the end of June.  She had a fractured tailbone.  She is been laying in bed a lot with this.  She is struggling to deal with the pain that this causes.  She has not noticed any leg swelling.  She has not been fully immobilized but has not been doing much    PAST MEDICAL HISTORY  History reviewed. No pertinent past medical history.    SOCIAL HISTORY  Social History     Vaping Use    Vaping Use: Never used   Substance Use Topics    Alcohol use: Not Currently    Drug use: Not Currently       CURRENT MEDICATIONS  Home Medications    **Home medications have not yet been reviewed for this encounter**         ALLERGIES  Not on File    PHYSICAL EXAM  VITAL SIGNS: BP (!) 129/95   Pulse (!) 111   Temp 36 °C (96.8 °F) (Temporal)   Resp 18   Ht 1.727 m (5' 8\")   Wt 61 kg (134 lb 7.7 oz)   SpO2 100%   BMI 20.45 kg/m²    Constitutional: Awake and alert  HENT: Normal inspection  Eyes: Normal inspection  Neck: Grossly normal range of motion.  Cardiovascular: Mildly elevated heart rate, Normal rhythm.  Symmetric peripheral pulses.   Thorax & Lungs: No respiratory distress, No wheezing, No rales, No rhonchi, exquisite tenderness to palpation of the left upper anterior and posterior chest wall  Abdomen: Bowel sounds normal, soft, non-distended, nontender, no mass  Skin: No vesicular " rash.  Extremities: No clubbing, cyanosis, edema, no Homans or cords.      DIAGNOSTIC STUDIES / PROCEDURES  LABS/EKG  Results for orders placed or performed during the hospital encounter of 07/19/23   CBC with Differential   Result Value Ref Range    WBC 4.6 (L) 4.8 - 10.8 K/uL    RBC 4.23 4.20 - 5.40 M/uL    Hemoglobin 13.6 12.0 - 16.0 g/dL    Hematocrit 41.1 37.0 - 47.0 %    MCV 97.2 81.4 - 97.8 fL    MCH 32.2 27.0 - 33.0 pg    MCHC 33.1 32.2 - 35.5 g/dL    RDW 49.5 35.9 - 50.0 fL    Platelet Count 337 164 - 446 K/uL    MPV 10.2 9.0 - 12.9 fL    Neutrophils-Polys 49.80 44.00 - 72.00 %    Lymphocytes 33.00 22.00 - 41.00 %    Monocytes 10.70 0.00 - 13.40 %    Eosinophils 3.90 0.00 - 6.90 %    Basophils 2.40 (H) 0.00 - 1.80 %    Immature Granulocytes 0.20 0.00 - 0.90 %    Nucleated RBC 0.00 0.00 - 0.20 /100 WBC    Neutrophils (Absolute) 2.29 1.82 - 7.42 K/uL    Lymphs (Absolute) 1.52 1.00 - 4.80 K/uL    Monos (Absolute) 0.49 0.00 - 0.85 K/uL    Eos (Absolute) 0.18 0.00 - 0.51 K/uL    Baso (Absolute) 0.11 0.00 - 0.12 K/uL    Immature Granulocytes (abs) 0.01 0.00 - 0.11 K/uL    NRBC (Absolute) 0.00 K/uL   Complete Metabolic Panel (CMP)   Result Value Ref Range    Sodium 140 135 - 145 mmol/L    Potassium 3.6 3.6 - 5.5 mmol/L    Chloride 99 96 - 112 mmol/L    Co2 24 20 - 33 mmol/L    Anion Gap 17.0 (H) 7.0 - 16.0    Glucose 104 (H) 65 - 99 mg/dL    Bun 12 8 - 22 mg/dL    Creatinine 0.93 0.50 - 1.40 mg/dL    Calcium 10.2 8.4 - 10.2 mg/dL    AST(SGOT) 150 (H) 12 - 45 U/L    ALT(SGPT) 66 (H) 2 - 50 U/L    Alkaline Phosphatase 167 (H) 30 - 99 U/L    Total Bilirubin 0.7 0.1 - 1.5 mg/dL    Albumin 4.8 3.2 - 4.9 g/dL    Total Protein 7.8 6.0 - 8.2 g/dL    Globulin 3.0 1.9 - 3.5 g/dL    A-G Ratio 1.6 g/dL   Troponins NOW   Result Value Ref Range    Troponin T <6 6 - 19 ng/L   Troponins in two (2) hours   Result Value Ref Range    Troponin T 6 6 - 19 ng/L   CORRECTED CALCIUM   Result Value Ref Range    Correct Calcium 9.6 8.5 -  10.5 mg/dL   ESTIMATED GFR   Result Value Ref Range    GFR (CKD-EPI) 68 >60 mL/min/1.73 m 2   ACETAMINOPHEN   Result Value Ref Range    Acetaminophen -Tylenol <5.0 (L) 10.0 - 30.0 ug/mL   Prothrombin Time   Result Value Ref Range    PT 12.5 12.0 - 14.6 sec    INR 0.89 0.87 - 1.13   EKG   Result Value Ref Range    Report       [facility].    Test Date:  2023  Pt Name:    WALDO OLVERA                 Department: [facility]  MRN:        [id number]                      Room:  Gender:     Female                       Technician: [id number]  :        1955                   Requested By:ER TRIAGE PROTOCOL  Order #:    [id number]                    Reading MD: ARTURO CARRIZALES MD    Measurements  Intervals                                Axis  Rate:       104                          P:          69  SC:         146                          QRS:        37  QRSD:       89                           T:          260  QT:         320  QTc:        421    Interpretive Statements  Sinus tachycardia  Low voltage, precordial leads  Nonspecific T abnrm, anterolateral leads  Baseline wander in lead(s) III,V1  No previous ECG available for comparison  Electronically Signed On 2023 17:53:50 PDT by ARTURO CARRIZALES MD        I have independently interpreted this EKG  Rhythm strip interpretation-sinus rhythm    RADIOLOGY  I have independently interpreted the diagnostic imaging associated with this visit and am waiting the final reading from the radiologist.   My preliminary interpretation is as follows: X-ray without pneumothorax  Radiologist interpretation:   US-RUQ   Final Result      1.  Hepatic steatosis   2.  Minimally distended gallbladder but no signs of acute inflammation and no cholelithiasis. This is of doubtful clinical significance.      CT-CTA CHEST PULMONARY ARTERY W/ RECONS   Final Result      1.  There is no CT evidence of acute pulmonary embolism.   2.  No pneumothorax, pneumonia  or pleural effusion.   3.  Small peripheral micronodules likely postinflammatory. No follow-up imaging is indicated per Fleischner guideline.   4.  Hepatic steatosis.            DX-CHEST-PORTABLE (1 VIEW)   Final Result      1.  There is no acute cardiopulmonary process.            COURSE & MEDICAL DECISION MAKING    ED Observation Status? Yes; I am placing the patient in to an observation status due to a diagnostic uncertainty as well as therapeutic intensity. Patient placed in observation status at 5:51 PM, 7/19/2023.     Observation plan is as follows: Obtain diagnostic testing, monitoring, serial exams    Upon Reevaluation, the patient's condition has: not improved; and will be escalated to hospitalization.    Patient discharged from ED Observation status at 9 PM 7/19/2023    INITIAL ASSESSMENT, COURSE AND PLAN  Care Narrative: Patient presents with left-sided chest pain.  Given her age and comorbidities high risk complaints requiring consideration multiple life-threatening problems.  She is mildly tachycardic.  EKG does not show acute ischemia.  Reports very sedentary with tailbone fracture.  PE is a consideration.  She has a benign abdominal exam.  Work-up was undertaken.    Laboratory data returned.  Spoke with the patient about abnormal LFTs.  She says that she has not been drinking very much.  Ordered right upper quadrant ultrasound.  She admitted to taking quite a bit of Tylenol.  Taking about 4 g of Tylenol a day for couple years.  Over the last 4 to 5 days taking at least 8 g of Tylenol a day.  Have concern for chronic Tylenol toxicity.  Consult poison control.    Troponin is normal with symptoms for several days.      CT pulmonary angiogram was negative for acute findings.  Suspect most likely chest pain musculoskeletal.    Ultrasound of the right upper quadrant without acute findings.  She has a benign abdomen.    After discussion with poison control Mucomyst therapy is recommended.  I spoke with the  patient again about this.  At that time she admitted that she has been drinking heavily over the last few weeks about 6 drinks a day.  This could be responsible for LFTs, but regardless Mucomyst was recommended.  This was ordered.  Patient will be admitted to hospitalist service.    Moderate heart score    DISPOSITION AND DISCUSSIONS  I have discussed management of the patient with the following physicians and MELLISA's: Dr. Rucker, hospitalist    Discussion of management with other [facility] or appropriate source(s): Poison control.  Pharmacy regarding Mucomyst therapy        FINAL IMPRESSION  1.  Acetaminophen toxicity  2.  chest pain, suspected musculoskeletal    CRITICAL CARE  The very real possibilty of a deterioration of this patient's condition required the highest level of my preparedness for sudden, emergent intervention.  I provided critical care services, which included medication orders, frequent reevaluations of the patient's condition and response to treatment, ordering and reviewing test results, and discussing the case with various consultants.  The critical care time associated with the care of the patient was 40 minutes. Review chart for interventions. This time is exclusive of any other billable procedures.       This dictation was created using voice recognition software. The accuracy of the dictation is limited to the abilities of the software. I expect there may be some errors of grammar and possibly content. The nursing notes were reviewed and certain aspects of this information were incorporated into this note.    Electronically signed by: Chacho Ramirez M.D., 7/19/2023

## 2023-07-20 NOTE — H&P
Hospital Medicine History & Physical Note    Date of Service  7/19/2023    Primary Care Physician  Pcp Pt States None    Consultants  None    Specialist Names: None    Code Status  Full Code    Chief Complaint  Chief Complaint   Patient presents with    Chest Pain     With shortness of breath for several days per pt    Arm Pain     Left arm, neck, and body pain         History of Presenting Illness  Odalys Keenan is a 67 y.o. female who presented 7/19/2023 with chest pain and tailbone pain.  Patient states she fell on June 20, broke her coccyx, has been in significant pain since then.  In order to improve the pain, she has been taking high-dose Tylenol as well as alcohol.  She states she takes 6-8 shots a day, takes multiple grams of Tylenol.  ER stated that she was taking 4 g a day and 8 g a day for the last couple of days but whenever I discussed that with her she did not know how much she was taking but she knew was a lot.  She was hesitant to take narcotics as an outpatient, she was on them long-term previously, after discussion she was okay with taking them while in the hospital only.  She does complain of another fall resulting in chest and left shoulder pain.  This is reproducible on exam.  She does have significant anxiety as well.  I did discuss the case including labs and imaging with the ER physician.    I discussed the plan of care with patient.    Review of Systems  Review of Systems   Constitutional:  Negative for chills, fever and malaise/fatigue.   HENT:  Negative for congestion.    Respiratory:  Negative for cough, sputum production, shortness of breath and stridor.    Cardiovascular:  Positive for chest pain. Negative for palpitations and leg swelling.   Gastrointestinal:  Negative for abdominal pain, constipation, diarrhea, nausea and vomiting.   Genitourinary:  Negative for dysuria and urgency.   Musculoskeletal:  Positive for falls and joint pain (left shoulder and coccyx). Negative for  myalgias.   Neurological:  Negative for dizziness, tingling, loss of consciousness, weakness and headaches.   Psychiatric/Behavioral:  Negative for depression and suicidal ideas. The patient is nervous/anxious.    All other systems reviewed and are negative.      Past Medical History   has no past medical history on file.    Surgical History   has no past surgical history on file.     Family History  family history is not on file.   Family history reviewed with patient. There is no family history that is pertinent to the chief complaint.     Social History   reports that she does not currently use alcohol. She reports that she does not currently use drugs.    Allergies  Not on File    Medications  Prior to Admission Medications   Prescriptions Last Dose Informant Patient Reported? Taking?   DULoxetine (CYMBALTA) 60 MG Cap DR Particles delayed-release capsule 7/19/2023 at 0930  Yes Yes   Sig: Take 60 mg by mouth every day. Takes in the AM   gabapentin (NEURONTIN) 300 MG Cap 7/19/2023 at 0930  Yes Yes   Sig: Take 300 mg by mouth every day. Takes in the AM   levETIRAcetam (KEPPRA) 500 MG Tab 7/19/2023 at 0930  Yes Yes   Sig: Take 500 mg by mouth 2 times a day.   metoprolol SR (TOPROL XL) 25 MG TABLET SR 24 HR 7/19/2023 at 0930  Yes Yes   Sig: Take 25 mg by mouth every day. Takes in the AM   mirtazapine (REMERON) 30 MG Tab tablet 7/18/2023 at pm  Yes Yes   Sig: Take 30 mg by mouth every evening.   rosuvastatin (CRESTOR) 20 MG Tab 7/19/2023 at 0930  Yes Yes   Sig: Take 20 mg by mouth every day. Takes in the AM      Facility-Administered Medications: None       Physical Exam  Temp:  [36 °C (96.8 °F)] 36 °C (96.8 °F)  Pulse:  [] 87  Resp:  [15-40] 20  BP: (107-150)/(74-95) 107/82  SpO2:  [93 %-100 %] 98 %  Blood Pressure : 107/82   Temperature: 36 °C (96.8 °F)   Pulse: 87   Respiration: 20   Pulse Oximetry: 98 %       Physical Exam  Vitals and nursing note reviewed.   Constitutional:       General: She is not in  acute distress.     Appearance: She is well-developed. She is not diaphoretic.   HENT:      Head: Normocephalic and atraumatic.      Right Ear: External ear normal.      Left Ear: External ear normal.      Nose: Nose normal. No congestion or rhinorrhea.      Mouth/Throat:      Mouth: Mucous membranes are moist.      Pharynx: No oropharyngeal exudate.   Eyes:      General:         Right eye: No discharge.         Left eye: No discharge.   Neck:      Trachea: No tracheal deviation.   Cardiovascular:      Rate and Rhythm: Normal rate and regular rhythm.      Heart sounds: Murmur heard.      No friction rub. No gallop.   Pulmonary:      Effort: Pulmonary effort is normal. No respiratory distress.      Breath sounds: Normal breath sounds. No stridor. No wheezing or rales.   Chest:      Chest wall: Tenderness present.   Abdominal:      General: Bowel sounds are normal. There is no distension.      Palpations: Abdomen is soft.      Tenderness: There is no abdominal tenderness.   Musculoskeletal:      Left shoulder: Tenderness present. Decreased range of motion.      Cervical back: Neck supple.      Right lower leg: No edema.      Left lower leg: No edema.      Comments: Coccyx tenderness   Lymphadenopathy:      Cervical: No cervical adenopathy.   Skin:     General: Skin is warm and dry.      Findings: No erythema or rash.   Neurological:      Mental Status: She is alert and oriented to person, place, and time.      Cranial Nerves: No cranial nerve deficit.   Psychiatric:         Mood and Affect: Mood is anxious.         Behavior: Behavior normal.         Thought Content: Thought content normal.         Judgment: Judgment normal.         Laboratory:  Recent Labs     07/19/23  1731   WBC 4.6*   RBC 4.23   HEMOGLOBIN 13.6   HEMATOCRIT 41.1   MCV 97.2   MCH 32.2   MCHC 33.1   RDW 49.5   PLATELETCT 337   MPV 10.2     Recent Labs     07/19/23  1731   SODIUM 140   POTASSIUM 3.6   CHLORIDE 99   CO2 24   GLUCOSE 104*   BUN 12    CREATININE 0.93   CALCIUM 10.2     Recent Labs     07/19/23  1731   ALTSGPT 66*   ASTSGOT 150*   ALKPHOSPHAT 167*   TBILIRUBIN 0.7   GLUCOSE 104*     Recent Labs     07/19/23  1737   INR 0.89     No results for input(s): NTPROBNP in the last 72 hours.      Recent Labs     07/19/23  1731 07/19/23  1933   TROPONINT <6 6       Imaging:  US-RUQ   Final Result      1.  Hepatic steatosis   2.  Minimally distended gallbladder but no signs of acute inflammation and no cholelithiasis. This is of doubtful clinical significance.      CT-CTA CHEST PULMONARY ARTERY W/ RECONS   Final Result      1.  There is no CT evidence of acute pulmonary embolism.   2.  No pneumothorax, pneumonia or pleural effusion.   3.  Small peripheral micronodules likely postinflammatory. No follow-up imaging is indicated per Fleischner guideline.   4.  Hepatic steatosis.            DX-CHEST-PORTABLE (1 VIEW)   Final Result      1.  There is no acute cardiopulmonary process.          X-Ray:  I have personally reviewed the images and compared with prior images.    Assessment/Plan:  Justification for Admission Status  I anticipate this patient will require at least two midnights for appropriate medical management, necessitating inpatient admission because Tylenol overdose    Patient will need a Med/Surg bed on MEDICAL service .  The need is secondary to Tylenol overdose.    * Tylenol overdose, accidental or unintentional, initial encounter- (present on admission)  Assessment & Plan  Patient was taking significant amounts of Tylenol in an effort to control pain  She has both chest pain and coccyx pain, she is okay with taking narcotics while in the hospital, does not want them prescribed at discharge  Increase Tylenol and alcohol associated with elevated liver enzymes, unsure if this is acute or chronic, repeat CMP in the morning  ERP did discuss the case with poison control who recommended Mucomyst until liver enzymes normalized  We will also repeat a  Tylenol level in the morning    Alcohol abuse- (present on admission)  Assessment & Plan  Patient has been drinking significant alcohol since her fall on June 20  She has not tried to quit in that time, she was primarily drinking for pain control  At this point time, she is certainly not in alcohol withdrawal but considering the duration of heavy drinking, she is at risk  If she does develop alcohol withdrawal, will start CIWA protocol, at this point in time will not be started however she will have IV Ativan as needed anxiety  Start folic acid, thiamine and multivitamin    Dyslipidemia- (present on admission)  Assessment & Plan  Continue on statin    Seizures (HCC)- (present on admission)  Assessment & Plan  Continue home Keppra    Anxiety and depression- (present on admission)  Assessment & Plan  Continue home Cymbalta and Remeron   start as needed Ativan    Elevated liver enzymes- (present on admission)  Assessment & Plan  Possibly multifactorial in patient who is taking significant mount of Tylenol and is drinking significant mount of alcohol  Tylenol level is pending  Certainly could be chronic, no additional labs in our system  Repeat CMP in the morning        VTE prophylaxis: SCDs/TEDs and Xarelto 10 mg daily as prophylaxis

## 2023-07-21 ENCOUNTER — APPOINTMENT (OUTPATIENT)
Dept: RADIOLOGY | Facility: MEDICAL CENTER | Age: 68
DRG: 897 | End: 2023-07-21
Attending: FAMILY MEDICINE
Payer: OTHER MISCELLANEOUS

## 2023-07-21 LAB
ALBUMIN SERPL BCP-MCNC: 3.8 G/DL (ref 3.2–4.9)
ALBUMIN SERPL BCP-MCNC: 3.8 G/DL (ref 3.2–4.9)
ALBUMIN/GLOB SERPL: 1.6 G/DL
ALP SERPL-CCNC: 124 U/L (ref 30–99)
ALP SERPL-CCNC: 128 U/L (ref 30–99)
ALT SERPL-CCNC: 43 U/L (ref 2–50)
ALT SERPL-CCNC: 48 U/L (ref 2–50)
ANION GAP SERPL CALC-SCNC: 12 MMOL/L (ref 7–16)
APAP SERPL-MCNC: <5 UG/ML (ref 10–30)
AST SERPL-CCNC: 60 U/L (ref 12–45)
AST SERPL-CCNC: 84 U/L (ref 12–45)
BASOPHILS # BLD AUTO: 1.8 % (ref 0–1.8)
BASOPHILS # BLD: 0.07 K/UL (ref 0–0.12)
BILIRUB CONJ SERPL-MCNC: <0.2 MG/DL (ref 0.1–0.5)
BILIRUB INDIRECT SERPL-MCNC: ABNORMAL MG/DL (ref 0–1)
BILIRUB SERPL-MCNC: 0.6 MG/DL (ref 0.1–1.5)
BILIRUB SERPL-MCNC: 0.7 MG/DL (ref 0.1–1.5)
BUN SERPL-MCNC: 8 MG/DL (ref 8–22)
CALCIUM ALBUM COR SERPL-MCNC: 9.4 MG/DL (ref 8.5–10.5)
CALCIUM SERPL-MCNC: 9.2 MG/DL (ref 8.4–10.2)
CHLORIDE SERPL-SCNC: 97 MMOL/L (ref 96–112)
CO2 SERPL-SCNC: 27 MMOL/L (ref 20–33)
CREAT SERPL-MCNC: 0.59 MG/DL (ref 0.5–1.4)
EOSINOPHIL # BLD AUTO: 0.17 K/UL (ref 0–0.51)
EOSINOPHIL NFR BLD: 4.3 % (ref 0–6.9)
ERYTHROCYTE [DISTWIDTH] IN BLOOD BY AUTOMATED COUNT: 48.4 FL (ref 35.9–50)
FERRITIN SERPL-MCNC: 1379 NG/ML (ref 10–291)
GFR SERPLBLD CREATININE-BSD FMLA CKD-EPI: 99 ML/MIN/1.73 M 2
GLOBULIN SER CALC-MCNC: 2.4 G/DL (ref 1.9–3.5)
GLUCOSE BLD STRIP.AUTO-MCNC: 94 MG/DL (ref 65–99)
GLUCOSE SERPL-MCNC: 105 MG/DL (ref 65–99)
HCT VFR BLD AUTO: 32.9 % (ref 37–47)
HCT VFR BLD AUTO: 33.8 % (ref 37–47)
HGB BLD-MCNC: 10.8 G/DL (ref 12–16)
HGB BLD-MCNC: 11.2 G/DL (ref 12–16)
IMM GRANULOCYTES # BLD AUTO: 0.01 K/UL (ref 0–0.11)
IMM GRANULOCYTES NFR BLD AUTO: 0.3 % (ref 0–0.9)
INR PPP: 1.46 (ref 0.87–1.13)
IRON SATN MFR SERPL: 62 % (ref 15–55)
IRON SERPL-MCNC: 151 UG/DL (ref 40–170)
LIPASE SERPL-CCNC: 111 U/L (ref 11–82)
LYMPHOCYTES # BLD AUTO: 1.14 K/UL (ref 1–4.8)
LYMPHOCYTES NFR BLD: 29.1 % (ref 22–41)
MAGNESIUM SERPL-MCNC: 1.3 MG/DL (ref 1.5–2.5)
MCH RBC QN AUTO: 32.2 PG (ref 27–33)
MCHC RBC AUTO-ENTMCNC: 32.8 G/DL (ref 32.2–35.5)
MCV RBC AUTO: 98.2 FL (ref 81.4–97.8)
MONOCYTES # BLD AUTO: 0.46 K/UL (ref 0–0.85)
MONOCYTES NFR BLD AUTO: 11.7 % (ref 0–13.4)
NEUTROPHILS # BLD AUTO: 2.07 K/UL (ref 1.82–7.42)
NEUTROPHILS NFR BLD: 52.8 % (ref 44–72)
NRBC # BLD AUTO: 0 K/UL
NRBC BLD-RTO: 0 /100 WBC (ref 0–0.2)
PHOSPHATE SERPL-MCNC: 3.4 MG/DL (ref 2.5–4.5)
PLATELET # BLD AUTO: 219 K/UL (ref 164–446)
PMV BLD AUTO: 11.2 FL (ref 9–12.9)
POTASSIUM SERPL-SCNC: 3.3 MMOL/L (ref 3.6–5.5)
PROT SERPL-MCNC: 6.1 G/DL (ref 6–8.2)
PROT SERPL-MCNC: 6.2 G/DL (ref 6–8.2)
PROTHROMBIN TIME: 18.5 SEC (ref 12–14.6)
RBC # BLD AUTO: 3.35 M/UL (ref 4.2–5.4)
SODIUM SERPL-SCNC: 136 MMOL/L (ref 135–145)
TIBC SERPL-MCNC: 243 UG/DL (ref 250–450)
UIBC SERPL-MCNC: 92 UG/DL (ref 110–370)
WBC # BLD AUTO: 3.9 K/UL (ref 4.8–10.8)

## 2023-07-21 PROCEDURE — 85014 HEMATOCRIT: CPT

## 2023-07-21 PROCEDURE — 83690 ASSAY OF LIPASE: CPT

## 2023-07-21 PROCEDURE — 80143 DRUG ASSAY ACETAMINOPHEN: CPT

## 2023-07-21 PROCEDURE — A9270 NON-COVERED ITEM OR SERVICE: HCPCS | Performed by: FAMILY MEDICINE

## 2023-07-21 PROCEDURE — 83550 IRON BINDING TEST: CPT

## 2023-07-21 PROCEDURE — 82962 GLUCOSE BLOOD TEST: CPT

## 2023-07-21 PROCEDURE — 83735 ASSAY OF MAGNESIUM: CPT

## 2023-07-21 PROCEDURE — 770001 HCHG ROOM/CARE - MED/SURG/GYN PRIV*

## 2023-07-21 PROCEDURE — 700105 HCHG RX REV CODE 258: Mod: JZ | Performed by: FAMILY MEDICINE

## 2023-07-21 PROCEDURE — 83540 ASSAY OF IRON: CPT

## 2023-07-21 PROCEDURE — 36415 COLL VENOUS BLD VENIPUNCTURE: CPT

## 2023-07-21 PROCEDURE — 94760 N-INVAS EAR/PLS OXIMETRY 1: CPT

## 2023-07-21 PROCEDURE — 700102 HCHG RX REV CODE 250 W/ 637 OVERRIDE(OP): Performed by: INTERNAL MEDICINE

## 2023-07-21 PROCEDURE — 85610 PROTHROMBIN TIME: CPT

## 2023-07-21 PROCEDURE — 700111 HCHG RX REV CODE 636 W/ 250 OVERRIDE (IP): Performed by: FAMILY MEDICINE

## 2023-07-21 PROCEDURE — 85018 HEMOGLOBIN: CPT

## 2023-07-21 PROCEDURE — 85025 COMPLETE CBC W/AUTO DIFF WBC: CPT

## 2023-07-21 PROCEDURE — 80053 COMPREHEN METABOLIC PANEL: CPT

## 2023-07-21 PROCEDURE — 80076 HEPATIC FUNCTION PANEL: CPT

## 2023-07-21 PROCEDURE — 84100 ASSAY OF PHOSPHORUS: CPT

## 2023-07-21 PROCEDURE — A9270 NON-COVERED ITEM OR SERVICE: HCPCS | Performed by: INTERNAL MEDICINE

## 2023-07-21 PROCEDURE — 82728 ASSAY OF FERRITIN: CPT

## 2023-07-21 PROCEDURE — 700102 HCHG RX REV CODE 250 W/ 637 OVERRIDE(OP): Performed by: FAMILY MEDICINE

## 2023-07-21 PROCEDURE — 99232 SBSQ HOSP IP/OBS MODERATE 35: CPT | Performed by: FAMILY MEDICINE

## 2023-07-21 RX ORDER — POTASSIUM CHLORIDE 1500 MG/1
40 TABLET, EXTENDED RELEASE ORAL ONCE
Status: COMPLETED | OUTPATIENT
Start: 2023-07-21 | End: 2023-07-21

## 2023-07-21 RX ORDER — MAGNESIUM SULFATE HEPTAHYDRATE 40 MG/ML
4 INJECTION, SOLUTION INTRAVENOUS ONCE
Status: COMPLETED | OUTPATIENT
Start: 2023-07-21 | End: 2023-07-21

## 2023-07-21 RX ADMIN — LORAZEPAM 3 MG: 1 TABLET ORAL at 21:21

## 2023-07-21 RX ADMIN — MAGNESIUM SULFATE HEPTAHYDRATE 4 G: 40 INJECTION, SOLUTION INTRAVENOUS at 07:57

## 2023-07-21 RX ADMIN — LORAZEPAM 1.5 MG: 2 INJECTION INTRAMUSCULAR; INTRAVENOUS at 23:12

## 2023-07-21 RX ADMIN — OXYCODONE HYDROCHLORIDE 10 MG: 10 TABLET ORAL at 02:13

## 2023-07-21 RX ADMIN — LORAZEPAM 3 MG: 1 TABLET ORAL at 20:39

## 2023-07-21 RX ADMIN — SENNOSIDES AND DOCUSATE SODIUM 2 TABLET: 50; 8.6 TABLET ORAL at 17:44

## 2023-07-21 RX ADMIN — OXYCODONE HYDROCHLORIDE 10 MG: 10 TABLET ORAL at 17:43

## 2023-07-21 RX ADMIN — SENNOSIDES AND DOCUSATE SODIUM 2 TABLET: 50; 8.6 TABLET ORAL at 05:47

## 2023-07-21 RX ADMIN — LEVETIRACETAM 500 MG: 500 TABLET, FILM COATED ORAL at 05:47

## 2023-07-21 RX ADMIN — FOLIC ACID 1 MG: 1 TABLET ORAL at 05:47

## 2023-07-21 RX ADMIN — LORAZEPAM 3 MG: 1 TABLET ORAL at 22:17

## 2023-07-21 RX ADMIN — OXYCODONE HYDROCHLORIDE 10 MG: 10 TABLET ORAL at 23:57

## 2023-07-21 RX ADMIN — DULOXETINE HYDROCHLORIDE 60 MG: 30 CAPSULE, DELAYED RELEASE ORAL at 05:47

## 2023-07-21 RX ADMIN — SODIUM CHLORIDE, POTASSIUM CHLORIDE, SODIUM LACTATE AND CALCIUM CHLORIDE: 600; 310; 30; 20 INJECTION, SOLUTION INTRAVENOUS at 06:41

## 2023-07-21 RX ADMIN — OXYCODONE HYDROCHLORIDE 10 MG: 10 TABLET ORAL at 06:38

## 2023-07-21 RX ADMIN — THERA TABS 1 TABLET: TAB at 05:47

## 2023-07-21 RX ADMIN — MIRTAZAPINE 30 MG: 15 TABLET, ORALLY DISINTEGRATING ORAL at 21:13

## 2023-07-21 RX ADMIN — POTASSIUM CHLORIDE 40 MEQ: 1500 TABLET, EXTENDED RELEASE ORAL at 07:51

## 2023-07-21 RX ADMIN — LORAZEPAM 1 MG: 1 TABLET ORAL at 04:11

## 2023-07-21 RX ADMIN — RIVAROXABAN 10 MG: 10 TABLET, FILM COATED ORAL at 17:44

## 2023-07-21 RX ADMIN — LORAZEPAM 1 MG: 1 TABLET ORAL at 16:43

## 2023-07-21 RX ADMIN — METOPROLOL SUCCINATE 25 MG: 25 TABLET, EXTENDED RELEASE ORAL at 05:47

## 2023-07-21 RX ADMIN — THIAMINE HCL TAB 100 MG 100 MG: 100 TAB at 05:47

## 2023-07-21 RX ADMIN — GABAPENTIN 300 MG: 300 CAPSULE ORAL at 05:47

## 2023-07-21 RX ADMIN — LORAZEPAM 1 MG: 1 TABLET ORAL at 08:33

## 2023-07-21 RX ADMIN — LORAZEPAM 1 MG: 1 TABLET ORAL at 00:11

## 2023-07-21 RX ADMIN — LORAZEPAM 1 MG: 1 TABLET ORAL at 12:52

## 2023-07-21 RX ADMIN — LEVETIRACETAM 500 MG: 500 TABLET, FILM COATED ORAL at 17:44

## 2023-07-21 ASSESSMENT — LIFESTYLE VARIABLES
AGITATION: NORMAL ACTIVITY
ANXIETY: *
HEADACHE, FULLNESS IN HEAD: MODERATELY SEVERE
ORIENTATION AND CLOUDING OF SENSORIUM: ORIENTED AND CAN DO SERIAL ADDITIONS
HEADACHE, FULLNESS IN HEAD: SEVERE
HEADACHE, FULLNESS IN HEAD: MODERATELY SEVERE
TOTAL SCORE: 8
NAUSEA AND VOMITING: NO NAUSEA AND NO VOMITING
PAROXYSMAL SWEATS: NO SWEAT VISIBLE
AUDITORY DISTURBANCES: NOT PRESENT
PAROXYSMAL SWEATS: *
TOTAL SCORE: 20
NAUSEA AND VOMITING: NO NAUSEA AND NO VOMITING
TOTAL SCORE: 10
AGITATION: NORMAL ACTIVITY
TOTAL SCORE: 8
NAUSEA AND VOMITING: NO NAUSEA AND NO VOMITING
TREMOR: *
TOTAL SCORE: 18
TOTAL SCORE: 10
ORIENTATION AND CLOUDING OF SENSORIUM: ORIENTED AND CAN DO SERIAL ADDITIONS
VISUAL DISTURBANCES: NOT PRESENT
AGITATION: NORMAL ACTIVITY
TREMOR: SEVERE TREMOR, EVEN WITH ARMS NOT EXTENDED
ORIENTATION AND CLOUDING OF SENSORIUM: ORIENTED AND CAN DO SERIAL ADDITIONS
VISUAL DISTURBANCES: NOT PRESENT
HEADACHE, FULLNESS IN HEAD: MODERATELY SEVERE
AUDITORY DISTURBANCES: NOT PRESENT
ORIENTATION AND CLOUDING OF SENSORIUM: ORIENTED AND CAN DO SERIAL ADDITIONS
TREMOR: MODERATE TREMOR WITH ARMS EXTENDED
VISUAL DISTURBANCES: NOT PRESENT
NAUSEA AND VOMITING: NO NAUSEA AND NO VOMITING
AUDITORY DISTURBANCES: NOT PRESENT
ANXIETY: MILDLY ANXIOUS
AUDITORY DISTURBANCES: NOT PRESENT
TOTAL SCORE: 8
TREMOR: *
HEADACHE, FULLNESS IN HEAD: MODERATELY SEVERE
VISUAL DISTURBANCES: NOT PRESENT
ORIENTATION AND CLOUDING OF SENSORIUM: ORIENTED AND CAN DO SERIAL ADDITIONS
VISUAL DISTURBANCES: NOT PRESENT
AUDITORY DISTURBANCES: NOT PRESENT
AGITATION: MODERATELY FIDGETY AND RESTLESS
NAUSEA AND VOMITING: NO NAUSEA AND NO VOMITING
PAROXYSMAL SWEATS: NO SWEAT VISIBLE
NAUSEA AND VOMITING: NO NAUSEA AND NO VOMITING
ANXIETY: *
ORIENTATION AND CLOUDING OF SENSORIUM: ORIENTED AND CAN DO SERIAL ADDITIONS
AUDITORY DISTURBANCES: NOT PRESENT
HEADACHE, FULLNESS IN HEAD: MODERATELY SEVERE
VISUAL DISTURBANCES: NOT PRESENT
TREMOR: MODERATE TREMOR WITH ARMS EXTENDED
ORIENTATION AND CLOUDING OF SENSORIUM: ORIENTED AND CAN DO SERIAL ADDITIONS
ANXIETY: NO ANXIETY (AT EASE)
ANXIETY: *
TREMOR: MODERATE TREMOR WITH ARMS EXTENDED
TOTAL SCORE: 20
NAUSEA AND VOMITING: NO NAUSEA AND NO VOMITING
NAUSEA AND VOMITING: NO NAUSEA AND NO VOMITING
AGITATION: NORMAL ACTIVITY
AGITATION: NORMAL ACTIVITY
PAROXYSMAL SWEATS: NO SWEAT VISIBLE
AGITATION: NORMAL ACTIVITY
PAROXYSMAL SWEATS: NO SWEAT VISIBLE
AUDITORY DISTURBANCES: NOT PRESENT
AUDITORY DISTURBANCES: NOT PRESENT
AGITATION: NORMAL ACTIVITY
ANXIETY: MILDLY ANXIOUS
ANXIETY: NO ANXIETY (AT EASE)
ORIENTATION AND CLOUDING OF SENSORIUM: ORIENTED AND CAN DO SERIAL ADDITIONS
VISUAL DISTURBANCES: NOT PRESENT
TREMOR: SEVERE TREMOR, EVEN WITH ARMS NOT EXTENDED
PAROXYSMAL SWEATS: BARELY PERCEPTIBLE SWEATING. PALMS MOIST
ORIENTATION AND CLOUDING OF SENSORIUM: ORIENTED AND CAN DO SERIAL ADDITIONS
VISUAL DISTURBANCES: NOT PRESENT
TREMOR: MODERATE TREMOR WITH ARMS EXTENDED
PAROXYSMAL SWEATS: *
HEADACHE, FULLNESS IN HEAD: SEVERE
ANXIETY: *
PAROXYSMAL SWEATS: *
HEADACHE, FULLNESS IN HEAD: SEVERE
PAROXYSMAL SWEATS: BARELY PERCEPTIBLE SWEATING. PALMS MOIST
VISUAL DISTURBANCES: NOT PRESENT
HEADACHE, FULLNESS IN HEAD: MODERATE
AUDITORY DISTURBANCES: NOT PRESENT
AGITATION: NORMAL ACTIVITY
TOTAL SCORE: 19
ANXIETY: NO ANXIETY (AT EASE)
NAUSEA AND VOMITING: NO NAUSEA AND NO VOMITING
TREMOR: *

## 2023-07-21 ASSESSMENT — PAIN DESCRIPTION - PAIN TYPE
TYPE: ACUTE PAIN

## 2023-07-21 ASSESSMENT — PATIENT HEALTH QUESTIONNAIRE - PHQ9
SUM OF ALL RESPONSES TO PHQ9 QUESTIONS 1 AND 2: 0
1. LITTLE INTEREST OR PLEASURE IN DOING THINGS: NOT AT ALL
2. FEELING DOWN, DEPRESSED, IRRITABLE, OR HOPELESS: NOT AT ALL

## 2023-07-21 NOTE — CARE PLAN
The patient is Watcher - Medium risk of patient condition declining or worsening    Shift Goals  Clinical Goals: Monitor CIWA throughout the shift. Pt will remain free from falls or injury throughout the shift.  Patient Goals: sleep and rest  Family Goals: n/a    Progress made toward(s) clinical / shift goals:  CIWA score at 10. Tremors on upper and lower extremities noted. Pt is free from fall and injury throughout the shift. Pt Seen sleeping comfortably in between rounds and easily aroused. 1 episode of pt deep on sleep and hard to wake up. Pt not in any form of distress. Hourly rounding in progress.    Patient is not progressing towards the following goals: n/a

## 2023-07-21 NOTE — PROGRESS NOTES
Hourly Rounds:     0800: CIWA protocol in place and assessed. Patient eating at this time. Calm unlabored breathing    1000: Pt sleeping at this time, equal chest rise and fall.    1400: Pt up eating meal at this time, charge RN aware of infiltrated IV, will attempt US IV    1800: Pt reports having 8/10 headache pain. Pt medicated with CIWA protocols, will review pain medication interventions.

## 2023-07-21 NOTE — PROGRESS NOTES
Rounding    1919 Introduction, no needs at this time  2110 Patient on phone, no needs at this time   2341 Patient sleeping   0105 Patient sleeping   0308 Patient sleeping   0420 Vitals check   0604 Patient sleeping

## 2023-07-21 NOTE — PROGRESS NOTES
"Hospital Medicine Daily Progress Note    Date of Service  7/21/2023    Chief Complaint  Odalys Keenan is a 67 y.o. female admitted 7/19/2023 with chest and arm pain    Hospital Course  This is a 66yo female with a history of seizure disorder, anklyosing spondylitis and Crohn's disease who presented to hospital with chest pain and tailbone pain - she had apparently recently broken her coccyx.  She had been taking high dose Tylenol and drinking alcohol to help with the pain - namely, approximately 8gm of Tylenol daily recently.  In the ER, she was noted to have mildly elevated LFTs and her Tylenol level was <5. Troponins were negative, and CTA did not show PTE. Poison control recommended NAC.    Interval Problem Update  7/20 - Repeat Tylenol level negative, LFTs down. Discussed with patient who states she was taking 4gm of Tylenol daily, and that she took 8gm daily x 4 days back in June when she first broke her tailbone. Pharmacy called Poison Control back with that information, they said we can stop NAC when her LFTs are back to \"her normal level\". Since we have no previous labs for comparison, will continue NAC until her LFTs plateau. LFTs are unlikely to return to normal levels given her 6-10 drinks daily.      Chest pain likely secondary to MSK pain, has since resolved. Alcohol level not obtained on admission, will check. Will start CIWA - initial score of 6, last drink 7/19 am.  Add seizure precautions. Pt does attest to wanting to quit drinking.     7/21 - Pt with tachycardia overnight, likely secondary to EtOH withdrawal, no fevers, no white count elevation. Drop in Hgb likely dilutional due to fluids. Check iron panel to ensure no iron deficiency associated with blood loss - recheck Hgb this afternoon, she denies melena. LFTs continue to trend down, recheck a Tylenol level, if negative, stop NAC. CIWAs of ~10-12.     I have discussed this patient's plan of care and discharge plan at IDT rounds today " with Case Management, Nursing, Nursing leadership, and other members of the IDT team.    Consultants/Specialty  none    Code Status  Full Code    Disposition    Anticipate discharge to: home with close outpatient follow-up    I have placed the appropriate orders for post-discharge needs.    Review of Systems  ROS     Physical Exam  Temp:  [36.4 °C (97.5 °F)-36.7 °C (98 °F)] 36.6 °C (97.8 °F)  Pulse:  [] 105  Resp:  [16-18] 17  BP: (108-155)/(80-90) 138/82  SpO2:  [94 %-98 %] 97 %    Physical Exam  Vitals and nursing note reviewed.   Constitutional:       Appearance: Normal appearance.      Comments: Tremulous     HENT:      Head: Normocephalic and atraumatic.      Mouth/Throat:      Mouth: Mucous membranes are moist.   Cardiovascular:      Rate and Rhythm: Normal rate and regular rhythm.   Pulmonary:      Effort: Pulmonary effort is normal. No respiratory distress.      Breath sounds: Normal breath sounds.   Abdominal:      General: Abdomen is flat. Bowel sounds are normal.      Palpations: Abdomen is soft.   Musculoskeletal:         General: No swelling.   Skin:     General: Skin is warm and dry.   Neurological:      General: No focal deficit present.      Mental Status: She is alert and oriented to person, place, and time. Mental status is at baseline.   Psychiatric:         Mood and Affect: Mood normal.         Behavior: Behavior normal.         Fluids    Intake/Output Summary (Last 24 hours) at 7/21/2023 0712  Last data filed at 7/21/2023 0600  Gross per 24 hour   Intake 2784.52 ml   Output 0 ml   Net 2784.52 ml         Laboratory  Recent Labs     07/19/23  1731 07/20/23  0358 07/21/23  0222   WBC 4.6* 5.5 3.9*   RBC 4.23 3.98* 3.35*   HEMOGLOBIN 13.6 12.8 10.8*   HEMATOCRIT 41.1 39.1 32.9*   MCV 97.2 98.2* 98.2*   MCH 32.2 32.2 32.2   MCHC 33.1 32.7 32.8   RDW 49.5 49.7 48.4   PLATELETCT 337 277 219   MPV 10.2 10.5 11.2       Recent Labs     07/20/23  0358 07/20/23  1730 07/21/23  0222   SODIUM 140 136  136   POTASSIUM 3.6 4.6 3.3*   CHLORIDE 100 98 97   CO2 24 28 27   GLUCOSE 130* 131* 105*   BUN 10 10 8   CREATININE 0.65 0.58 0.59   CALCIUM 9.1 9.1 9.2       Recent Labs     07/19/23  1737 07/20/23  1617 07/21/23  0222   INR 0.89 1.14* 1.46*                 Imaging  US-RUQ   Final Result      1.  Hepatic steatosis   2.  Minimally distended gallbladder but no signs of acute inflammation and no cholelithiasis. This is of doubtful clinical significance.      CT-CTA CHEST PULMONARY ARTERY W/ RECONS   Final Result      1.  There is no CT evidence of acute pulmonary embolism.   2.  No pneumothorax, pneumonia or pleural effusion.   3.  Small peripheral micronodules likely postinflammatory. No follow-up imaging is indicated per Fleischner guideline.   4.  Hepatic steatosis.            DX-CHEST-PORTABLE (1 VIEW)   Final Result      1.  There is no acute cardiopulmonary process.             Assessment/Plan  * Tylenol overdose, accidental or unintentional, initial encounter- (present on admission)  Assessment & Plan  - Pt was taking 4gm of Tylenol daily, took 8gm daily for four days in June per patient   - Poison control recommending NAC despite Tylenol levels <5 x 2, recommending to continue until LFTs are back to normal but I do not expect pt's LFTs to return to normal levels given her 6-10 drinks daily.  Will continue NAC until her LFTs plateau or otherwise dictated by Poison Control    Dyslipidemia- (present on admission)  Assessment & Plan  Hold statin    Seizures (HCC)- (present on admission)  Assessment & Plan  Continue home Keppra    Anxiety and depression- (present on admission)  Assessment & Plan  Continue home Cymbalta and Remeron   start as needed Ativan    Elevated liver enzymes- (present on admission)  Assessment & Plan  Possibly multifactorial in patient who is taking significant mount of Tylenol and is drinking significant mount of alcohol  Tylenol level is negative x 2, this appears to be almost all EtOH  related   Certainly could be chronic, no additional labs in our system  Repeat CMP in the morning    Alcohol abuse- (present on admission)  Assessment & Plan  Pt drinks 6-10 shots daily per pt  Does wish to quit  CIWA, pt tremulous this morning  EtOH positive on admit  Last drink 7/19         VTE prophylaxis: SCDs/TEDs and Xarelto 10 mg daily as prophylaxis    I have performed a physical exam and reviewed and updated ROS and Plan today (7/21/2023). In review of yesterday's note (7/20/2023), there are no changes except as documented above.

## 2023-07-21 NOTE — PROGRESS NOTES
Received report from day shift nurse. Assumed pt care at 1915.  Pt is A&Ox4, resting comfortably in bed. Pt on oxygen at 1 Lpm via nasal cannula; no signs of SOB/respiratory distress. Pt complains of pain at 9/10 at this moment; prn pain medication given as per MAR. BP checked prior to administration.   Needs attended well. Fall precautions in place. Bed at lowest position. Call light and personal belongings within reach. Bed alarm on and maintained.  Hourly rounding in progress.    2025 Received a call from [facility] from Poison Control; asked regarding latest lab results of pt: Tylenol <5.0, INR 1.14, , ALT 52. Serenity states to continue another 16-hr IV bag of Mucomyst at this time; and call poison control if doctor or pharmacist has question regarding the Mucomyst. Will relay.  Charge nurse aware.  Informed pharmacist on duty regarding need of another bag of Mucomyst. Pharmacist states bag is in the fridge; given to pt as per MAR.    2124 Pt assisted to restroom and back to bed with a front wheel walker for ambulation. Toilet hat provided but pt unable to sit aligned; urine not in.   Tremors noted.   Scheduled medication given as per MAR.  Hourly rounding in progress.    2200 Pt resting with eyes closed; listening music on phone. CIWA score of 12; prn Ativan given as per MAR. Vital signs checked prior to administration of medication. Pulse oximeter on and maintained.   Strip alarm placed and turned on; pt states understanding regarding importance of the strip alarm.     0000 Pt seen sleeping comfortably; even and unlabored breathing noted and easily aroused. Vital signs checked.  CIWA score of 10; Ativan given as per MAR.    0400 Pt sleeping comfortably; easily aroused. Vital signs checked.  CIWA score at 10; Ativan prn given as per MAR.    0536 Pt seen sleeping comfortably; even and unlabored breathing noted.  Attempted to wake up pt for 10 mins.with charge nurse; pt sleeping deeply; unlabored breathing  "noted. Vital signs taken and recorded.  Pt woke up and startled. Pt states, 'I finally slept and I was dreaming.\" Informed pt that she was hard to wake up this morning and pt also agrees. Pt states. 'It's the first I slept deeply in days.\"  ICU nurse also came to see pt. Pt awake and not in any form of distress.  Pt is cold; warm blanket provided. Blood glucose checked at 94 mg/dl. Apple juice given per pt's preference.  Scheduled medications given as per MAR.    0638 Pt still complains of headache and back pain; 9/10; prn pain medication given as per MAR. BP checked prior to administration.  Pt awake and not in any form of distress.    Report given to Xiomara HARRY.      "

## 2023-07-21 NOTE — PROGRESS NOTES
0922 pt sleeping listening to book woke to vitals    1106 pt sleeping with unlabored breathing and pulse ox on    1320 pt up to bathroom and back to bed bed alarm on    1500 pt sleeping with pulse ox on    1750 pt sitting at side of bed eating

## 2023-07-21 NOTE — CARE PLAN
The patient is Stable - Low risk of patient condition declining or worsening    Shift Goals  Clinical Goals: Monitor CIWA throughout the shift. Pt will remain free from falls or injury throughout the shift.  Patient Goals: Rest  Family Goals: n/a    Progress made toward(s) clinical / shift goals:  Pt remained     Patient is not progressing towards the following goals:

## 2023-07-21 NOTE — DIETARY
"Nutrition services: Day 2 of admit.  Odalys Keenan is a 67 y.o. female with admitting DX of Tylenol overdose, accidental or unintentional     Consult received for MST of 4 on nutrition screen due to report of wt loss of 24-33 lb x > 1 year and poor PO PTA.        Assessment:  Height: 172.7 cm (5' 8\")  Weight: 61 kg (134 lb 7.7 oz)  Body mass index is 20.45 kg/m²., BMI classification: WNL  Diet/Intake: regular, vegetarian/ % of most meals.      Evaluation:   DX includes Crohn's disease and alcohol abuse.   Labs: 7/21/23: potassium=3.3, phos=3.4, mag=1.3  Meds: folic acid, Remeron, MVI, thiamine  Pt reports both planned and unplanned wt loss in the past. More recently she has had unplanned significant wt loss of 10 lb (6.9%) x 1 month due to PO 50% of normal due to COVID infection.   Pt reports that her current appetite has been good.      Malnutrition Risk: pt meets ASPEN criteria for severe malnutrition in the context of acute illness r/t diminished PO due to infection AEB report of 6.9% wt loss x 1 month and PO 50% of normal x 1 month.     Recommendations/Plan:   Encourage intake of >50%  Document intake of all meals as % taken in ADL's to provide interdisciplinary communication across all shifts.   Monitor weight.  Nutrition rep will continue to see patient for ongoing meal and snack preferences.      RD will follow  "

## 2023-07-22 PROBLEM — F10.221 ACUTE ALCOHOL INTOXICATION WITH ALCOHOLISM WITH DELIRIUM (HCC): Status: ACTIVE | Noted: 2023-07-20

## 2023-07-22 PROCEDURE — A9270 NON-COVERED ITEM OR SERVICE: HCPCS | Performed by: FAMILY MEDICINE

## 2023-07-22 PROCEDURE — 700102 HCHG RX REV CODE 250 W/ 637 OVERRIDE(OP): Performed by: FAMILY MEDICINE

## 2023-07-22 PROCEDURE — 700101 HCHG RX REV CODE 250

## 2023-07-22 PROCEDURE — 700102 HCHG RX REV CODE 250 W/ 637 OVERRIDE(OP): Performed by: INTERNAL MEDICINE

## 2023-07-22 PROCEDURE — A9270 NON-COVERED ITEM OR SERVICE: HCPCS | Performed by: INTERNAL MEDICINE

## 2023-07-22 PROCEDURE — 770020 HCHG ROOM/CARE - TELE (206)

## 2023-07-22 PROCEDURE — 99291 CRITICAL CARE FIRST HOUR: CPT | Performed by: INTERNAL MEDICINE

## 2023-07-22 PROCEDURE — 700105 HCHG RX REV CODE 258: Mod: JZ | Performed by: FAMILY MEDICINE

## 2023-07-22 PROCEDURE — 700111 HCHG RX REV CODE 636 W/ 250 OVERRIDE (IP): Mod: JZ | Performed by: FAMILY MEDICINE

## 2023-07-22 RX ORDER — DEXMEDETOMIDINE HYDROCHLORIDE 4 UG/ML
.1-1.5 INJECTION, SOLUTION INTRAVENOUS CONTINUOUS
Status: DISCONTINUED | OUTPATIENT
Start: 2023-07-22 | End: 2023-07-23

## 2023-07-22 RX ORDER — DEXMEDETOMIDINE HYDROCHLORIDE 4 UG/ML
INJECTION, SOLUTION INTRAVENOUS
Status: COMPLETED
Start: 2023-07-22 | End: 2023-07-22

## 2023-07-22 RX ORDER — TRAMADOL HYDROCHLORIDE 50 MG/1
50 TABLET ORAL EVERY 6 HOURS PRN
Status: DISCONTINUED | OUTPATIENT
Start: 2023-07-22 | End: 2023-07-26 | Stop reason: HOSPADM

## 2023-07-22 RX ORDER — CHLORDIAZEPOXIDE HYDROCHLORIDE 25 MG/1
50 CAPSULE, GELATIN COATED ORAL EVERY 6 HOURS
Status: DISCONTINUED | OUTPATIENT
Start: 2023-07-22 | End: 2023-07-22

## 2023-07-22 RX ORDER — CHLORDIAZEPOXIDE HYDROCHLORIDE 25 MG/1
25 CAPSULE, GELATIN COATED ORAL EVERY 6 HOURS
Status: DISCONTINUED | OUTPATIENT
Start: 2023-07-23 | End: 2023-07-22

## 2023-07-22 RX ORDER — CHLORDIAZEPOXIDE HYDROCHLORIDE 25 MG/1
25 CAPSULE, GELATIN COATED ORAL EVERY 6 HOURS
Status: DISCONTINUED | OUTPATIENT
Start: 2023-07-22 | End: 2023-07-23

## 2023-07-22 RX ADMIN — LORAZEPAM 2 MG: 2 INJECTION INTRAMUSCULAR; INTRAVENOUS at 14:13

## 2023-07-22 RX ADMIN — CHLORDIAZEPOXIDE HYDROCHLORIDE 25 MG: 25 CAPSULE ORAL at 17:54

## 2023-07-22 RX ADMIN — SODIUM CHLORIDE, POTASSIUM CHLORIDE, SODIUM LACTATE AND CALCIUM CHLORIDE: 600; 310; 30; 20 INJECTION, SOLUTION INTRAVENOUS at 01:59

## 2023-07-22 RX ADMIN — CHLORDIAZEPOXIDE HYDROCHLORIDE 25 MG: 25 CAPSULE ORAL at 23:21

## 2023-07-22 RX ADMIN — LORAZEPAM 2 MG: 2 INJECTION INTRAMUSCULAR; INTRAVENOUS at 01:22

## 2023-07-22 RX ADMIN — SODIUM CHLORIDE, POTASSIUM CHLORIDE, SODIUM LACTATE AND CALCIUM CHLORIDE: 600; 310; 30; 20 INJECTION, SOLUTION INTRAVENOUS at 14:22

## 2023-07-22 RX ADMIN — LORAZEPAM 2 MG: 2 INJECTION INTRAMUSCULAR; INTRAVENOUS at 01:39

## 2023-07-22 RX ADMIN — LORAZEPAM 2 MG: 1 TABLET ORAL at 20:15

## 2023-07-22 RX ADMIN — LEVETIRACETAM 500 MG: 500 TABLET, FILM COATED ORAL at 17:54

## 2023-07-22 RX ADMIN — RIVAROXABAN 10 MG: 10 TABLET, FILM COATED ORAL at 17:54

## 2023-07-22 RX ADMIN — LORAZEPAM 1.5 MG: 2 INJECTION INTRAMUSCULAR; INTRAVENOUS at 22:43

## 2023-07-22 RX ADMIN — TRAMADOL HYDROCHLORIDE 50 MG: 50 TABLET, COATED ORAL at 20:26

## 2023-07-22 RX ADMIN — LORAZEPAM 2 MG: 2 INJECTION INTRAMUSCULAR; INTRAVENOUS at 23:47

## 2023-07-22 RX ADMIN — LORAZEPAM 2 MG: 2 INJECTION INTRAMUSCULAR; INTRAVENOUS at 02:37

## 2023-07-22 RX ADMIN — OXYCODONE 5 MG: 5 TABLET ORAL at 22:23

## 2023-07-22 RX ADMIN — DEXMEDETOMIDINE HYDROCHLORIDE 0.2 MCG/KG/HR: 400 INJECTION INTRAVENOUS at 02:32

## 2023-07-22 RX ADMIN — MIRTAZAPINE 30 MG: 15 TABLET, ORALLY DISINTEGRATING ORAL at 20:26

## 2023-07-22 RX ADMIN — LORAZEPAM 2 MG: 1 TABLET ORAL at 22:15

## 2023-07-22 RX ADMIN — LORAZEPAM 2 MG: 2 INJECTION INTRAMUSCULAR; INTRAVENOUS at 23:22

## 2023-07-22 RX ADMIN — LORAZEPAM 2 MG: 2 INJECTION INTRAMUSCULAR; INTRAVENOUS at 01:05

## 2023-07-22 RX ADMIN — LORAZEPAM 2 MG: 2 INJECTION INTRAMUSCULAR; INTRAVENOUS at 02:22

## 2023-07-22 RX ADMIN — LORAZEPAM 1.5 MG: 2 INJECTION INTRAMUSCULAR; INTRAVENOUS at 00:34

## 2023-07-22 RX ADMIN — LORAZEPAM 2 MG: 1 TABLET ORAL at 17:53

## 2023-07-22 ASSESSMENT — LIFESTYLE VARIABLES
AUDITORY DISTURBANCES: NOT PRESENT
PAROXYSMAL SWEATS: NO SWEAT VISIBLE
TREMOR: SEVERE TREMOR, EVEN WITH ARMS NOT EXTENDED
ORIENTATION AND CLOUDING OF SENSORIUM: CANNOT DO SERIAL ADDITIONS OR IS UNCERTAIN ABOUT DATE
PAROXYSMAL SWEATS: NO SWEAT VISIBLE
AUDITORY DISTURBANCES: NOT PRESENT
PAROXYSMAL SWEATS: NO SWEAT VISIBLE
ORIENTATION AND CLOUDING OF SENSORIUM: CANNOT DO SERIAL ADDITIONS OR IS UNCERTAIN ABOUT DATE
PAROXYSMAL SWEATS: NO SWEAT VISIBLE
TREMOR: *
ORIENTATION AND CLOUDING OF SENSORIUM: ORIENTED AND CAN DO SERIAL ADDITIONS
NAUSEA AND VOMITING: NO NAUSEA AND NO VOMITING
ORIENTATION AND CLOUDING OF SENSORIUM: DISORIENTED FOR PLACE AND / OR PERSON
ANXIETY: *
NAUSEA AND VOMITING: NO NAUSEA AND NO VOMITING
NAUSEA AND VOMITING: NO NAUSEA AND NO VOMITING
VISUAL DISTURBANCES: NOT PRESENT
VISUAL DISTURBANCES: NOT PRESENT
ANXIETY: NO ANXIETY (AT EASE)
PAROXYSMAL SWEATS: BARELY PERCEPTIBLE SWEATING. PALMS MOIST
AGITATION: SOMEWHAT MORE THAN NORMAL ACTIVITY
HEADACHE, FULLNESS IN HEAD: MODERATELY SEVERE
AGITATION: NORMAL ACTIVITY
PAROXYSMAL SWEATS: NO SWEAT VISIBLE
HEADACHE, FULLNESS IN HEAD: MODERATE
ANXIETY: *
TOTAL SCORE: 11
VISUAL DISTURBANCES: NOT PRESENT
TREMOR: SEVERE TREMOR, EVEN WITH ARMS NOT EXTENDED
HEADACHE, FULLNESS IN HEAD: EXTREMELY SEVERE
ANXIETY: *
ORIENTATION AND CLOUDING OF SENSORIUM: CANNOT DO SERIAL ADDITIONS OR IS UNCERTAIN ABOUT DATE
TREMOR: SEVERE TREMOR, EVEN WITH ARMS NOT EXTENDED
NAUSEA AND VOMITING: NO NAUSEA AND NO VOMITING
NAUSEA AND VOMITING: NO NAUSEA AND NO VOMITING
TOTAL SCORE: 12
VISUAL DISTURBANCES: NOT PRESENT
NAUSEA AND VOMITING: NO NAUSEA AND NO VOMITING
HEADACHE, FULLNESS IN HEAD: MODERATELY SEVERE
PAROXYSMAL SWEATS: BARELY PERCEPTIBLE SWEATING. PALMS MOIST
PAROXYSMAL SWEATS: NO SWEAT VISIBLE
AUDITORY DISTURBANCES: NOT PRESENT
VISUAL DISTURBANCES: VERY MILD SENSITIVITY
AGITATION: MODERATELY FIDGETY AND RESTLESS
PAROXYSMAL SWEATS: NO SWEAT VISIBLE
HEADACHE, FULLNESS IN HEAD: VERY MILD
AGITATION: *
HEADACHE, FULLNESS IN HEAD: SEVERE
VISUAL DISTURBANCES: NOT PRESENT
HEADACHE, FULLNESS IN HEAD: MODERATE
TOTAL SCORE: 11
ANXIETY: MODERATELY ANXIOUS OR GUARDED, SO ANXIETY IS INFERRED
VISUAL DISTURBANCES: VERY MILD SENSITIVITY
PAROXYSMAL SWEATS: NO SWEAT VISIBLE
NAUSEA AND VOMITING: NO NAUSEA AND NO VOMITING
VISUAL DISTURBANCES: NOT PRESENT
AUDITORY DISTURBANCES: VERY MILD HARSHNESS OR ABILITY TO FRIGHTEN
TOTAL SCORE: 27
TREMOR: SEVERE TREMOR, EVEN WITH ARMS NOT EXTENDED
HEADACHE, FULLNESS IN HEAD: MODERATELY SEVERE
TREMOR: SEVERE TREMOR, EVEN WITH ARMS NOT EXTENDED
ORIENTATION AND CLOUDING OF SENSORIUM: ORIENTED AND CAN DO SERIAL ADDITIONS
AGITATION: *
VISUAL DISTURBANCES: VERY MILD SENSITIVITY
AUDITORY DISTURBANCES: NOT PRESENT
NAUSEA AND VOMITING: NO NAUSEA AND NO VOMITING
PAROXYSMAL SWEATS: NO SWEAT VISIBLE
TREMOR: *
ANXIETY: EQUIVALENT TO ACUTE PANIC STATES AS OCCUR IN SEVERE DELIRIUM OR ACUTE SCHIZOPHRENIC REACTIONS
TREMOR: *
TOTAL SCORE: 20
TREMOR: SEVERE TREMOR, EVEN WITH ARMS NOT EXTENDED
VISUAL DISTURBANCES: NOT PRESENT
ANXIETY: NO ANXIETY (AT EASE)
TOTAL SCORE: VERY MILD ITCHING, PINS AND NEEDLES SENSATION, BURNING OR NUMBNESS
AUDITORY DISTURBANCES: VERY MILD HARSHNESS OR ABILITY TO FRIGHTEN
TOTAL SCORE: 29
AUDITORY DISTURBANCES: NOT PRESENT
TREMOR: SEVERE TREMOR, EVEN WITH ARMS NOT EXTENDED
PAROXYSMAL SWEATS: BARELY PERCEPTIBLE SWEATING. PALMS MOIST
AGITATION: *
HEADACHE, FULLNESS IN HEAD: MILD
VISUAL DISTURBANCES: MODERATELY SEVERE HALLUCINATIONS
VISUAL DISTURBANCES: NOT PRESENT
ANXIETY: NO ANXIETY (AT EASE)
TREMOR: *
NAUSEA AND VOMITING: NO NAUSEA AND NO VOMITING
TOTAL SCORE: MILD ITCHING, PINS AND NEEDLES SENSATION, BURNING OR NUMBNESS
ORIENTATION AND CLOUDING OF SENSORIUM: DISORIENTED FOR PLACE AND / OR PERSON
ORIENTATION AND CLOUDING OF SENSORIUM: CANNOT DO SERIAL ADDITIONS OR IS UNCERTAIN ABOUT DATE
PAROXYSMAL SWEATS: BARELY PERCEPTIBLE SWEATING. PALMS MOIST
NAUSEA AND VOMITING: NO NAUSEA AND NO VOMITING
ANXIETY: EQUIVALENT TO ACUTE PANIC STATES AS OCCUR IN SEVERE DELIRIUM OR ACUTE SCHIZOPHRENIC REACTIONS
ORIENTATION AND CLOUDING OF SENSORIUM: CANNOT DO SERIAL ADDITIONS OR IS UNCERTAIN ABOUT DATE
AUDITORY DISTURBANCES: NOT PRESENT
HEADACHE, FULLNESS IN HEAD: SEVERE
TREMOR: SEVERE TREMOR, EVEN WITH ARMS NOT EXTENDED
ORIENTATION AND CLOUDING OF SENSORIUM: CANNOT DO SERIAL ADDITIONS OR IS UNCERTAIN ABOUT DATE
TREMOR: SEVERE TREMOR, EVEN WITH ARMS NOT EXTENDED
TOTAL SCORE: 26
AGITATION: *
AGITATION: *
HEADACHE, FULLNESS IN HEAD: MILD
AGITATION: NORMAL ACTIVITY
PAROXYSMAL SWEATS: BARELY PERCEPTIBLE SWEATING. PALMS MOIST
AGITATION: NORMAL ACTIVITY
AUDITORY DISTURBANCES: VERY MILD HARSHNESS OR ABILITY TO FRIGHTEN
HEADACHE, FULLNESS IN HEAD: SEVERE
VISUAL DISTURBANCES: VERY MILD SENSITIVITY
VISUAL DISTURBANCES: NOT PRESENT
TOTAL SCORE: 25
ANXIETY: EQUIVALENT TO ACUTE PANIC STATES AS OCCUR IN SEVERE DELIRIUM OR ACUTE SCHIZOPHRENIC REACTIONS
TREMOR: SEVERE TREMOR, EVEN WITH ARMS NOT EXTENDED
AUDITORY DISTURBANCES: VERY MILD HARSHNESS OR ABILITY TO FRIGHTEN
AGITATION: MODERATELY FIDGETY AND RESTLESS
NAUSEA AND VOMITING: NO NAUSEA AND NO VOMITING
ORIENTATION AND CLOUDING OF SENSORIUM: CANNOT DO SERIAL ADDITIONS OR IS UNCERTAIN ABOUT DATE
HEADACHE, FULLNESS IN HEAD: MODERATE
ANXIETY: *
TREMOR: SEVERE TREMOR, EVEN WITH ARMS NOT EXTENDED
AGITATION: PACES BACK AND FORTH DURING MOST OF THE INTERVIEW OR CONSTANTLY THRASHES ABOUT.
TOTAL SCORE: 26
TOTAL SCORE: 19
ORIENTATION AND CLOUDING OF SENSORIUM: DISORIENTED FOR PLACE AND / OR PERSON
AUDITORY DISTURBANCES: NOT PRESENT
NAUSEA AND VOMITING: NO NAUSEA AND NO VOMITING
HEADACHE, FULLNESS IN HEAD: SEVERE
AUDITORY DISTURBANCES: NOT PRESENT
HEADACHE, FULLNESS IN HEAD: MODERATE
TOTAL SCORE: 27
ANXIETY: EQUIVALENT TO ACUTE PANIC STATES AS OCCUR IN SEVERE DELIRIUM OR ACUTE SCHIZOPHRENIC REACTIONS
AUDITORY DISTURBANCES: NOT PRESENT
ANXIETY: *
ORIENTATION AND CLOUDING OF SENSORIUM: CANNOT DO SERIAL ADDITIONS OR IS UNCERTAIN ABOUT DATE
ANXIETY: *
NAUSEA AND VOMITING: NO NAUSEA AND NO VOMITING
AGITATION: *
NAUSEA AND VOMITING: NO NAUSEA AND NO VOMITING
TOTAL SCORE: 26
AGITATION: MODERATELY FIDGETY AND RESTLESS
ORIENTATION AND CLOUDING OF SENSORIUM: DATE DISORIENTATION BY NO MORE THAN TWO CALENDAR DAYS
TOTAL SCORE: 10
NAUSEA AND VOMITING: NO NAUSEA AND NO VOMITING
PAROXYSMAL SWEATS: NO SWEAT VISIBLE
AUDITORY DISTURBANCES: VERY MILD HARSHNESS OR ABILITY TO FRIGHTEN
TOTAL SCORE: 27
ANXIETY: MILDLY ANXIOUS
AGITATION: MODERATELY FIDGETY AND RESTLESS
VISUAL DISTURBANCES: NOT PRESENT
AUDITORY DISTURBANCES: NOT PRESENT
TOTAL SCORE: 25
ORIENTATION AND CLOUDING OF SENSORIUM: CANNOT DO SERIAL ADDITIONS OR IS UNCERTAIN ABOUT DATE

## 2023-07-22 ASSESSMENT — PAIN DESCRIPTION - PAIN TYPE
TYPE: ACUTE PAIN

## 2023-07-22 ASSESSMENT — ENCOUNTER SYMPTOMS: BACK PAIN: 1

## 2023-07-22 NOTE — CARE PLAN
The patient is Watcher - Medium risk of patient condition declining or worsening    Shift Goals  Clinical Goals: Monitor ETOH, titrate Dex gtt  Patient Goals: KUN  Family Goals: KUN    Progress made toward(s) clinical / shift goals:    Problem: Pain - Standard  Goal: Alleviation of pain or a reduction in pain to the patient’s comfort goal  Outcome: Progressing     Problem: Fall Risk  Goal: Patient will remain free from falls  Outcome: Progressing     Problem: Hemodynamics  Goal: Patient's hemodynamics, fluid balance and neurologic status will be stable or improve  Outcome: Progressing     Problem: Optimal Care for Alcohol Withdrawal  Goal: Optimal Care for the alcohol withdrawal patient  Outcome: Progressing     Problem: Seizure Precautions  Goal: Implementation of seizure precautions  Outcome: Progressing     Problem: Risk for Aspiration  Goal: Patient's risk for aspiration will be absent or decrease  Outcome: Progressing       Patient is not progressing towards the following goals:      Problem: Knowledge Deficit - Standard  Goal: Patient and family/care givers will demonstrate understanding of plan of care, disease process/condition, diagnostic tests and medications  Outcome: Not Progressing     Problem: Psychosocial  Goal: Patient's level of anxiety will decrease  Outcome: Not Progressing  Goal: Patient's ability to verbalize feelings about condition will improve  Outcome: Not Progressing     Problem: Nutrition  Goal: Patient's nutritional and fluid intake will be adequate or improve  Outcome: Not Progressing     Problem: Psychosocial  Goal: Patient's level of anxiety will decrease  Outcome: Not Progressing

## 2023-07-22 NOTE — PROGRESS NOTES
[facility] charge RN Arelis asked this RN to assess pt's CIWA score. Pt seen and assessed at bedside by this RN. CIWA score assessed at 18; see charting. This RN advised primary RN SHABBIR to proceed with administration of IV Ativan dosing per MAR. Pt will continue to be followed by RRT RN. Concerns relayed to Dr. Rucker via Voalte at 2346 hours; no further orders received.

## 2023-07-22 NOTE — CONSULTS
Critical Care Consultation    Date of consult: 7/22/2023    Referring Physician  Leonel Rucker DO    Reason for Consultation  Acute alcohol withdrawal    History of Presenting Illness    67 y.o. female who presented 7/19/2023 with complaints of chest and sacral pain.  She had a ground-level fall last month, broke her coccyx, and has been in significant pain since then.  To control the pain she has been taking high-dose Tylenol 4 to 8 g/day as well as alcohol drinking 6-8 shots per day.  He has a history of requiring narcotics for long-term so she has been avoiding these.  She has a history of dyslipidemia, seizures on Keppra, anxiety and depression on Cymbalta and Remeron    Serum Tylenol level was undetectably low on admission.  Checked twice.  Patient control was contacted recommending NAC despite low levels.  She had mildly elevated transaminases.  Otherwise no fulminant signs of liver failure.  Serum EtOH 43.  The course of admission she developed signs and symptoms of alcohol withdrawal was started on Ativan.  Overnight her withdrawal symptoms progressively got worse CIWA 20+ and difficult to control so she was transferred to the ICU for Precedex drip.    Cardiac: tachycardic, hypertensive  Pulm: 1LNC  Neuro: precedex gtt  Heme: anemic, stable  Renal/volume status: reviewed  ID:  no issues   GI/endo: mildly elevated AST > ALT  Labs/Imaging: reviewed    Code Status  Full Code    Review of Systems  Review of Systems   Musculoskeletal:  Positive for back pain.   All other systems reviewed and are negative.    Past Medical History  She has a history of dyslipidemia, seizures on Keppra, anxiety and depression on Cymbalta and Remeron    Surgical History   has no past surgical history on file.    Family History  Family history reviewed and no significant conditions or diseases relevant to the chief complaint were identified    Social History   reports that she does not currently use alcohol. She reports that she does  not currently use drugs.    Medications  Home Medications       Reviewed by Stephany Hawthorne R.N. (Registered Nurse) on 07/19/23 at 2132  Med List Status: Complete     Medication Last Dose Status   DULoxetine (CYMBALTA) 60 MG Cap DR Particles delayed-release capsule 7/19/2023 Active   gabapentin (NEURONTIN) 300 MG Cap 7/19/2023 Active   levETIRAcetam (KEPPRA) 500 MG Tab 7/19/2023 Active   metoprolol SR (TOPROL XL) 25 MG TABLET SR 24 HR 7/19/2023 Active   mirtazapine (REMERON) 30 MG Tab tablet 7/18/2023 Active   rosuvastatin (CRESTOR) 20 MG Tab 7/19/2023 Active                  Current Facility-Administered Medications   Medication Dose Route Frequency Provider Last Rate Last Admin    dexmedetomidine (PRECEDEX) 400 mcg/100mL NS premix infusion  0.1-1.5 mcg/kg/hr (Ideal) Intravenous Continuous JAYA BettsO. 4.8 mL/hr at 07/22/23 0622 0.3 mcg/kg/hr at 07/22/23 0622    folic acid (Folvite) tablet 1 mg  1 mg Oral DAILY JAYA BettsO.   1 mg at 07/21/23 0547    thiamine (Vitamin B-1) tablet 100 mg  100 mg Oral DAILY JAYA BettsO.   100 mg at 07/21/23 0547    multivitamin tablet 1 Tablet  1 Tablet Oral DAILY JAYA BettsO.   1 Tablet at 07/21/23 0547    sodium chloride (Ocean) 0.65 % nasal spray 2 Spray  2 Spray Nasal Q2HRS PRN Leonel Rucker D.O.        LORazepam (Ativan) tablet 0.5 mg  0.5 mg Oral Q4HRS PRN Archana Franco M.D.   0.5 mg at 07/20/23 1849    LORazepam (Ativan) tablet 1 mg  1 mg Oral Q4HRS PRN Archana Franco M.D.   1 mg at 07/21/23 1643    Or    LORazepam (Ativan) injection 0.5 mg  0.5 mg Intravenous Q4HRS PRN Archana Franco M.D.        LORazepam (Ativan) tablet 2 mg  2 mg Oral Q2HRS PRN Archana Franco M.D.   2 mg at 07/20/23 2217    Or    LORazepam (Ativan) injection 1 mg  1 mg Intravenous Q2HRS PRSUZIE Franco M.D.        LORazepam (Ativan) tablet 3 mg  3 mg Oral Q HOUR PRSUZIE Franco M.D.   3 mg at 07/21/23 2217    Or    LORazepam (Ativan) injection 1.5  mg  1.5 mg Intravenous Q HOUR PRN Archana Franco M.D.   1.5 mg at 07/22/23 0034    LORazepam (Ativan) tablet 4 mg  4 mg Oral Q15 MIN PRN Archana Franco M.D.        Or    LORazepam (Ativan) injection 2 mg  2 mg Intravenous Q15 MIN PRN Archana Franco M.D.   2 mg at 07/22/23 0237    lactated ringers infusion   Intravenous Continuous Archana Franco M.D. 83 mL/hr at 07/22/23 0159 New Bag at 07/22/23 0159    DULoxetine (Cymbalta) capsule 60 mg  60 mg Oral DAILY JAYA BettsO.   60 mg at 07/21/23 0547    gabapentin (Neurontin) capsule 300 mg  300 mg Oral DAILY JAYA BettsO.   300 mg at 07/21/23 0547    levETIRAcetam (Keppra) tablet 500 mg  500 mg Oral BID JAYA BettsO.   500 mg at 07/21/23 1744    metoprolol SR (Toprol XL) tablet 25 mg  25 mg Oral DAILY JAYA BettsO.   25 mg at 07/21/23 0547    mirtazapine (Remeron) orally disintegrating tab 30 mg  30 mg Oral Nightly JAYA BettsO.   30 mg at 07/21/23 2113    senna-docusate (Pericolace Or Senokot S) 8.6-50 MG per tablet 2 Tablet  2 Tablet Oral BID JAYA BettsO.   2 Tablet at 07/21/23 1744    And    polyethylene glycol/lytes (Miralax) PACKET 1 Packet  1 Packet Oral QDAY PRN Leonel Rucker D.O.        And    magnesium hydroxide (Milk Of Magnesia) suspension 30 mL  30 mL Oral QDAY PRN Leonel Rucker D.O.        And    bisacodyl (Dulcolax) suppository 10 mg  10 mg Rectal QDAY PRN Leonel Rucker D.O.        rivaroxaban (Xarelto) tablet 10 mg  10 mg Oral DAILY AT 1800 JAYA BettsO.   10 mg at 07/21/23 1744    Pharmacy Consult Request ...Pain Management Review 1 Each  1 Each Other PHARMACY TO DOSE Leonel Rucker D.O.        oxyCODONE immediate-release (Roxicodone) tablet 5 mg  5 mg Oral Q3HRS PRN JAYA BettsOChiquis   5 mg at 07/20/23 2310    Or    oxyCODONE immediate release (Roxicodone) tablet 10 mg  10 mg Oral Q3HRS PRN JAYA BettsOChiquis   10 mg at 07/21/23 3297    Or    morphine 4 MG/ML injection 4 mg  4  mg Intravenous Q3HRS PRN Leonel Rucker D.O.   4 mg at 07/20/23 1928    labetalol (Normodyne/Trandate) injection 10 mg  10 mg Intravenous Q4HRS PRN Leonel Rucker D.O.        ondansetron (Zofran) syringe/vial injection 4 mg  4 mg Intravenous Q4HRS PRN Leonel Rucker D.O.        ondansetron (Zofran ODT) dispertab 4 mg  4 mg Oral Q4HRS PRN Leonel Rucker D.O.         Allergies  No Known Allergies    Vital Signs last 24 hours  Temp:  [35.8 °C (96.5 °F)-36.8 °C (98.3 °F)] 35.8 °C (96.5 °F)  Pulse:  [] 66  Resp:  [13-25] 13  BP: ()/() 120/70  SpO2:  [91 %-100 %] 100 %    Physical Exam  Physical Exam  Vitals and nursing note reviewed.   Constitutional:       General: She is not in acute distress.     Appearance: Normal appearance. She is well-developed. She is not diaphoretic.      Comments: Very pleasant   Eyes:      General: No scleral icterus.        Right eye: No discharge.         Left eye: No discharge.      Conjunctiva/sclera: Conjunctivae normal.      Pupils: Pupils are equal, round, and reactive to light.   Neck:      Thyroid: No thyromegaly.      Vascular: No JVD.   Cardiovascular:      Rate and Rhythm: Normal rate and regular rhythm.      Heart sounds: Normal heart sounds. No murmur heard.     No gallop.   Pulmonary:      Effort: Pulmonary effort is normal. No respiratory distress.      Breath sounds: Normal breath sounds. No wheezing or rales.   Abdominal:      General: There is no distension.      Palpations: Abdomen is soft.      Tenderness: There is no abdominal tenderness. There is no guarding.   Musculoskeletal:         General: No tenderness.   Lymphadenopathy:      Cervical: No cervical adenopathy.   Skin:     General: Skin is warm.      Capillary Refill: Capillary refill takes less than 2 seconds.      Findings: No erythema or rash.   Neurological:      Mental Status: She is alert and oriented to person, place, and time.      Cranial Nerves: No cranial nerve deficit.       Sensory: No sensory deficit.      Motor: Tremor present. No abnormal muscle tone.   Psychiatric:         Attention and Perception: Attention and perception normal.         Mood and Affect: Mood and affect normal.         Speech: Speech normal.         Behavior: Behavior normal. Behavior is cooperative.         Thought Content: Thought content normal. Thought content is not delusional.       Fluids    Intake/Output Summary (Last 24 hours) at 7/22/2023 0923  Last data filed at 7/22/2023 0622  Gross per 24 hour   Intake 868.48 ml   Output 1050 ml   Net -181.52 ml     Laboratory  Recent Results (from the past 48 hour(s))   ACETAMINOPHEN    Collection Time: 07/20/23  4:17 PM   Result Value Ref Range    Acetaminophen -Tylenol <5.0 (L) 10.0 - 30.0 ug/mL   Prothrombin Time    Collection Time: 07/20/23  4:17 PM   Result Value Ref Range    PT 15.2 (H) 12.0 - 14.6 sec    INR 1.14 (H) 0.87 - 1.13   Comp Metabolic Panel    Collection Time: 07/20/23  5:30 PM   Result Value Ref Range    Sodium 136 135 - 145 mmol/L    Potassium 4.6 3.6 - 5.5 mmol/L    Chloride 98 96 - 112 mmol/L    Co2 28 20 - 33 mmol/L    Anion Gap 10.0 7.0 - 16.0    Glucose 131 (H) 65 - 99 mg/dL    Bun 10 8 - 22 mg/dL    Creatinine 0.58 0.50 - 1.40 mg/dL    Calcium 9.1 8.4 - 10.2 mg/dL    Correct Calcium 9.2 8.5 - 10.5 mg/dL    AST(SGOT) 109 (H) 12 - 45 U/L    ALT(SGPT) 52 (H) 2 - 50 U/L    Alkaline Phosphatase 136 (H) 30 - 99 U/L    Total Bilirubin 0.8 0.1 - 1.5 mg/dL    Albumin 3.9 3.2 - 4.9 g/dL    Total Protein 6.3 6.0 - 8.2 g/dL    Globulin 2.4 1.9 - 3.5 g/dL    A-G Ratio 1.6 g/dL   ESTIMATED GFR    Collection Time: 07/20/23  5:30 PM   Result Value Ref Range    GFR (CKD-EPI) 100 >60 mL/min/1.73 m 2   Magnesium    Collection Time: 07/21/23  2:22 AM   Result Value Ref Range    Magnesium 1.3 (L) 1.5 - 2.5 mg/dL   Phosphorus    Collection Time: 07/21/23  2:22 AM   Result Value Ref Range    Phosphorus 3.4 2.5 - 4.5 mg/dL   CBC WITH DIFFERENTIAL    Collection  Time: 07/21/23  2:22 AM   Result Value Ref Range    WBC 3.9 (L) 4.8 - 10.8 K/uL    RBC 3.35 (L) 4.20 - 5.40 M/uL    Hemoglobin 10.8 (L) 12.0 - 16.0 g/dL    Hematocrit 32.9 (L) 37.0 - 47.0 %    MCV 98.2 (H) 81.4 - 97.8 fL    MCH 32.2 27.0 - 33.0 pg    MCHC 32.8 32.2 - 35.5 g/dL    RDW 48.4 35.9 - 50.0 fL    Platelet Count 219 164 - 446 K/uL    MPV 11.2 9.0 - 12.9 fL    Neutrophils-Polys 52.80 44.00 - 72.00 %    Lymphocytes 29.10 22.00 - 41.00 %    Monocytes 11.70 0.00 - 13.40 %    Eosinophils 4.30 0.00 - 6.90 %    Basophils 1.80 0.00 - 1.80 %    Immature Granulocytes 0.30 0.00 - 0.90 %    Nucleated RBC 0.00 0.00 - 0.20 /100 WBC    Neutrophils (Absolute) 2.07 1.82 - 7.42 K/uL    Lymphs (Absolute) 1.14 1.00 - 4.80 K/uL    Monos (Absolute) 0.46 0.00 - 0.85 K/uL    Eos (Absolute) 0.17 0.00 - 0.51 K/uL    Baso (Absolute) 0.07 0.00 - 0.12 K/uL    Immature Granulocytes (abs) 0.01 0.00 - 0.11 K/uL    NRBC (Absolute) 0.00 K/uL   Comp Metabolic Panel    Collection Time: 07/21/23  2:22 AM   Result Value Ref Range    Sodium 136 135 - 145 mmol/L    Potassium 3.3 (L) 3.6 - 5.5 mmol/L    Chloride 97 96 - 112 mmol/L    Co2 27 20 - 33 mmol/L    Anion Gap 12.0 7.0 - 16.0    Glucose 105 (H) 65 - 99 mg/dL    Bun 8 8 - 22 mg/dL    Creatinine 0.59 0.50 - 1.40 mg/dL    Calcium 9.2 8.4 - 10.2 mg/dL    Correct Calcium 9.4 8.5 - 10.5 mg/dL    AST(SGOT) 84 (H) 12 - 45 U/L    ALT(SGPT) 48 2 - 50 U/L    Alkaline Phosphatase 128 (H) 30 - 99 U/L    Total Bilirubin 0.7 0.1 - 1.5 mg/dL    Albumin 3.8 3.2 - 4.9 g/dL    Total Protein 6.2 6.0 - 8.2 g/dL    Globulin 2.4 1.9 - 3.5 g/dL    A-G Ratio 1.6 g/dL   Prothrombin Time    Collection Time: 07/21/23  2:22 AM   Result Value Ref Range    PT 18.5 (H) 12.0 - 14.6 sec    INR 1.46 (H) 0.87 - 1.13   ESTIMATED GFR    Collection Time: 07/21/23  2:22 AM   Result Value Ref Range    GFR (CKD-EPI) 99 >60 mL/min/1.73 m 2   IRON/TOTAL IRON BIND    Collection Time: 07/21/23  2:22 AM   Result Value Ref Range    Iron  151 40 - 170 ug/dL    Total Iron Binding 243 (L) 250 - 450 ug/dL    Unsat Iron Binding 92 (L) 110 - 370 ug/dL    % Saturation 62 (H) 15 - 55 %   FERRITIN    Collection Time: 07/21/23  2:22 AM   Result Value Ref Range    Ferritin 1379.0 (H) 10.0 - 291.0 ng/mL   LIPASE    Collection Time: 07/21/23  2:22 AM   Result Value Ref Range    Lipase 111 (H) 11 - 82 U/L   POCT glucose device results    Collection Time: 07/21/23  5:45 AM   Result Value Ref Range    POC Glucose, Blood 94 65 - 99 mg/dL   ACETAMINOPHEN    Collection Time: 07/21/23  8:14 AM   Result Value Ref Range    Acetaminophen -Tylenol <5.0 (L) 10.0 - 30.0 ug/mL   HEMOGLOBIN AND HEMATOCRIT    Collection Time: 07/21/23  2:11 PM   Result Value Ref Range    Hemoglobin 11.2 (L) 12.0 - 16.0 g/dL    Hematocrit 33.8 (L) 37.0 - 47.0 %   HEPATIC FUNCTION PANEL    Collection Time: 07/21/23  2:11 PM   Result Value Ref Range    Alkaline Phosphatase 124 (H) 30 - 99 U/L    AST(SGOT) 60 (H) 12 - 45 U/L    ALT(SGPT) 43 2 - 50 U/L    Total Bilirubin 0.6 0.1 - 1.5 mg/dL    Direct Bilirubin <0.2 0.1 - 0.5 mg/dL    Indirect Bilirubin see below 0.0 - 1.0 mg/dL    Albumin 3.8 3.2 - 4.9 g/dL    Total Protein 6.1 6.0 - 8.2 g/dL     Imaging  IR-US GUIDED PIV   Final Result    Ultrasound-guided PERIPHERAL IV INSERTION performed by    qualified nursing staff as above.      US-RUQ   Final Result      1.  Hepatic steatosis   2.  Minimally distended gallbladder but no signs of acute inflammation and no cholelithiasis. This is of doubtful clinical significance.      CT-CTA CHEST PULMONARY ARTERY W/ RECONS   Final Result      1.  There is no CT evidence of acute pulmonary embolism.   2.  No pneumothorax, pneumonia or pleural effusion.   3.  Small peripheral micronodules likely postinflammatory. No follow-up imaging is indicated per Fleischner guideline.   4.  Hepatic steatosis.            DX-CHEST-PORTABLE (1 VIEW)   Final Result      1.  There is no acute cardiopulmonary process.         Assessment/Plan    * Acute alcohol intoxication with alcoholism with delirium (HCC)- (present on admission)  Assessment & Plan  Alcohol withdrawal with delirium, with history of non-alcohol related seizures  Wean precedex gtt, ativan ladder per CIWA/EtOH withdrawal protocol  Start PO librium  S/p rally bag, cont multivitamins  ICU electrolyte repletion protocol  Seizure precautions  Protecting airway adequately, cont PO diet for now    Tylenol overdose, accidental or unintentional, initial encounter- (present on admission)  Assessment & Plan  S/p NAC  Avoid acetaminophen for pain control for coccyx injury  Refuses NSAIDS due to hx of IBS  Trial low dose tramadol    Seizures (HCC)- (present on admission)  Assessment & Plan  History of  Continue keppra, neurontin  Seizure precautions  Plan of care as outlined above.    Anxiety and depression- (present on admission)  Assessment & Plan  Continue home Cymbalta and Remeron   Cont as needed Ativan      Discussed patient condition and risk of morbidity and/or mortality with RN, Pharmacy, Charge nurse / hot rounds, and Patient.      The patient remains critically ill.  Critical care time = 40 minutes in directly providing and coordinating critical care and extensive data review.  No time overlap and excludes procedures.    This note was generated using voice recognition software which has a chance of producing errors of grammar and content.  I have made every reasonable attempt to find and correct any errors, but it should be expected that some may not be found prior to finalization of this note.  __________  Ed Esteban MD  Pulmonary and Critical Care Medicine  [facility]

## 2023-07-22 NOTE — PROGRESS NOTES
Hospital Medicine Daily Progress Note    Date of Service  7/22/2023    Chief Complaint  Odalys Keenan is a 67 y.o. female admitted 7/19/2023 with concern for Tylenol overdose    Hospital Course  No notes on file    Interval Problem Update  Patient again having worsening alcohol withdrawal, patient was given significant IV Ativan and yet CIWA score continue to worsen.  Decision was made to move patient to the ICU.    I have discussed this patient's plan of care and discharge plan at IDT rounds today with Case Management, Nursing, Nursing leadership, and other members of the IDT team.    Consultants/Specialty  None    Code Status  Full Code    Disposition  Medically Cleared  I have placed the appropriate orders for post-discharge needs.    Review of Systems  Review of Systems   Unable to perform ROS: Acuity of condition        Physical Exam  Temp:  [36.1 °C (97 °F)-36.8 °C (98.3 °F)] 36.7 °C (98.1 °F)  Pulse:  [] 73  Resp:  [14-20] 20  BP: ()/() 109/68  SpO2:  [91 %-100 %] 100 %    Physical Exam  Vitals and nursing note reviewed.   Constitutional:       General: She is not in acute distress.     Appearance: She is well-developed. She is not diaphoretic.   HENT:      Head: Normocephalic and atraumatic.      Right Ear: External ear normal.      Left Ear: External ear normal.      Nose: Nose normal. No congestion or rhinorrhea.      Mouth/Throat:      Pharynx: No oropharyngeal exudate.   Eyes:      General:         Right eye: No discharge.         Left eye: No discharge.   Neck:      Trachea: No tracheal deviation.   Cardiovascular:      Rate and Rhythm: Normal rate and regular rhythm.   Pulmonary:      Effort: Pulmonary effort is normal. No respiratory distress.   Abdominal:      General: Bowel sounds are normal. There is no distension.   Musculoskeletal:      Cervical back: Neck supple.      Right lower leg: No edema.      Left lower leg: No edema.   Lymphadenopathy:      Cervical: No cervical  adenopathy.   Skin:     General: Skin is warm and dry.      Findings: No erythema or rash.   Neurological:      Comments: Agitated          Fluids    Intake/Output Summary (Last 24 hours) at 7/22/2023 0626  Last data filed at 7/22/2023 0622  Gross per 24 hour   Intake 1228.48 ml   Output 1050 ml   Net 178.48 ml       Laboratory  Recent Labs     07/19/23  1731 07/20/23  0358 07/21/23  0222 07/21/23  1411   WBC 4.6* 5.5 3.9*  --    RBC 4.23 3.98* 3.35*  --    HEMOGLOBIN 13.6 12.8 10.8* 11.2*   HEMATOCRIT 41.1 39.1 32.9* 33.8*   MCV 97.2 98.2* 98.2*  --    MCH 32.2 32.2 32.2  --    MCHC 33.1 32.7 32.8  --    RDW 49.5 49.7 48.4  --    PLATELETCT 337 277 219  --    MPV 10.2 10.5 11.2  --      Recent Labs     07/20/23  0358 07/20/23  1730 07/21/23  0222   SODIUM 140 136 136   POTASSIUM 3.6 4.6 3.3*   CHLORIDE 100 98 97   CO2 24 28 27   GLUCOSE 130* 131* 105*   BUN 10 10 8   CREATININE 0.65 0.58 0.59   CALCIUM 9.1 9.1 9.2     Recent Labs     07/19/23  1737 07/20/23  1617 07/21/23  0222   INR 0.89 1.14* 1.46*               Imaging  IR-US GUIDED PIV   Final Result    Ultrasound-guided PERIPHERAL IV INSERTION performed by    qualified nursing staff as above.      US-RUQ   Final Result      1.  Hepatic steatosis   2.  Minimally distended gallbladder but no signs of acute inflammation and no cholelithiasis. This is of doubtful clinical significance.      CT-CTA CHEST PULMONARY ARTERY W/ RECONS   Final Result      1.  There is no CT evidence of acute pulmonary embolism.   2.  No pneumothorax, pneumonia or pleural effusion.   3.  Small peripheral micronodules likely postinflammatory. No follow-up imaging is indicated per Fleischner guideline.   4.  Hepatic steatosis.            DX-CHEST-PORTABLE (1 VIEW)   Final Result      1.  There is no acute cardiopulmonary process.           Assessment/Plan  * Tylenol overdose, accidental or unintentional, initial encounter- (present on admission)  Assessment & Plan  - Pt was taking 4gm of  Tylenol daily, took 8gm daily for four days in June per patient   - Poison control recommending NAC despite Tylenol levels <5 x 2, recommending to continue until LFTs are back to normal but I do not expect pt's LFTs to return to normal levels given her 6-10 drinks daily.  Will continue NAC until her LFTs plateau or otherwise dictated by Poison Control    Alcohol abuse- (present on admission)  Assessment & Plan  Now with severe alcohol withdrawal  I will move patient to the ICU to start a Precedex drip  Patient has worsened a relatively quick rate, I am concerned she will continue to worsen if Ativan is the only medication  Patient currently can protect her airway    Patient is critically ill.   The patient continues to have : Alcohol withdrawal  The vital organ system that is effected is the: All are at risk but neuro initially  If untreated there is a high chance of deterioration into: Seizures  The critical care that I am providing today is: Precedex drip  The critical care that has been undertaken is medically complex.   There has been no overlap in critical care time.  Critical care time not including procedures, no overlap: 35 minutes    Dyslipidemia- (present on admission)  Assessment & Plan  Hold statin    Seizures (HCC)- (present on admission)  Assessment & Plan  Continue home Keppra    Anxiety and depression- (present on admission)  Assessment & Plan  Continue home Cymbalta and Remeron   start as needed Ativan    Elevated liver enzymes- (present on admission)  Assessment & Plan  Possibly multifactorial in patient who is taking significant mount of Tylenol and is drinking significant mount of alcohol  Tylenol level is negative x 2, this appears to be almost all EtOH related   Certainly could be chronic, no additional labs in our system  Repeat CMP in the morning         VTE prophylaxis: SCDs/TEDs and Xarelto 10 mg daily as prophylaxis    I have performed a physical exam and reviewed and updated ROS and Plan  today (7/22/2023). In review of yesterday's note (7/21/2023), there are no changes except as documented above.

## 2023-07-22 NOTE — PROGRESS NOTES
Pt to ICU room 319 via bed on telemetry monitoring at 0210 hours accompanied by this RN and PIERO SHEA. Care transferred to PIERO Swain.

## 2023-07-22 NOTE — CARE PLAN
The patient is Watcher - Medium risk of patient condition declining or worsening    Shift Goals  Clinical Goals: monitor ETOH withdrawal, precedex gtt  Patient Goals: feel better  Family Goals: KUN    Progress made toward(s) clinical / shift goals:    Problem: Pain - Standard  Goal: Alleviation of pain or a reduction in pain to the patient’s comfort goal  Outcome: Progressing     Problem: Fall Risk  Goal: Patient will remain free from falls  Outcome: Progressing     Problem: Hemodynamics  Goal: Patient's hemodynamics, fluid balance and neurologic status will be stable or improve  Outcome: Progressing     Problem: Seizure Precautions  Goal: Implementation of seizure precautions  Outcome: Progressing       Patient is not progressing towards the following goals:      Problem: Psychosocial  Goal: Patient's level of anxiety will decrease  Outcome: Not Progressing     Problem: Psychosocial  Goal: Patient's level of anxiety will decrease  Outcome: Not Progressing

## 2023-07-22 NOTE — PROGRESS NOTES
12-hour chart check complete.    Monitor Summary  Rhythm: SR  Rate: 70-97  Ectopy: none  Measurements: 0.16/0.08/0.32

## 2023-07-22 NOTE — PROGRESS NOTES
This RN at bedside to assess pt. CIWA scores in the mid- to high-20s; personally scored by this RN at a 25. Pt is disoriented to self, restless and agitated. Dr. Rucker updated on above via Voalte at 0154 hours. This RN recommended transfer to higher level of care for closer monitoring d/t high acuity of pt at this time. Orders being placed for transfer to ICU.

## 2023-07-22 NOTE — PROGRESS NOTES
0105H Pt assessed, CIWA score 27, IV Ativan 2 mg given per score, pt refused to take orally, vs taken and kept monitored, pulse oximeter attached.     0122H Another dose of IV Ativan 2mg, pt kept monitored      0139H Another dose of IV Ativan 2 mg given, RRT RN kept monitored, RN at bedside since 0050H

## 2023-07-22 NOTE — PROGRESS NOTES
1915 Received report from day shift nurse, Xiomara HARRY.  Assumed pt care at 1915.   Pt is resting with eyes closed on bed. Pt on oxygen at 1 Lpm via nasal cannula.     2015 CIWA score of 20. Tremors noted even when on rest and laying on bed; headache is severe at 9/10. PRN Ativan given as per MAR. CIWA monitoring q 1hr. Notified Dr. Madera on HR sustaining at 120-130s. MD placed order for continuous cardiac monitoring.     2108 Telemonitor tech messaged this RN that pt is ST sustaining in 120s.   Hourly rounding in progress.    2115 CIWA score of 19.Tremors seen slightly even when at rest. Headache is still severe at 9/10. Pt states she is having chest pain at 6/10 that was on and off for about a week. Joanie HARRY aware. PRN Ativan given as per MAR.  Will continue to monitor pt hourly.    2214 Pt resting with eyes closed. Pt still complains of severe headache at 9/10 and feeling of anxiety is severe. Pt sweating on body.  CIWA at 20; prn Ativan given as per MAR.    2235 Informed Dr. Rucker regarding pt's condition and CIWA score of 20. Pt given with oral Ativan and MD states may start giving IV Ativan.  Charge nurse aware.    2312 ICU nurse Adri at bedside to assess CIWA. CIWA score at 18. Adri HARRY says okay to give Ativan prn then may given prn pain medication. Pt states understanding.   Ativan given as per MAR.     2357 Pt on bed and resting with eyes closed. Pt complaining of headache at 10/10; prn oxycodone given as per MAR. BP checked prior to administration. Informed pt regarding medication to be given and the reason oral is given prior to Morphine IV; pt upset but states understanding.    0034 CIWA score of 19. PRN Ativan IV given as per MAR.  Pt states she wants applesauce and apple juice given.  Pt sitting on bed and eating applesauce at this time  RN and charge at bedside the whole time.     0053 Pt finished eating applesauce then suddenly , pt started to get confused and states she did not get her pain  medication. Pt reoriented and informed that oxycodone was taken earlier. Pt states she does not know  we are and why we are in her room. Pt agitated and trying to get out of bed.   Assisted pt back to bed with Charge nurse at bedside.   CIWA assessed by Charge nurse at this time. CIWA score of 27.  Charge nurse with pt at bedside.    0150 Pt confused and disoriented; restless. Assisted back to bed on comfortable position.    0202 Pt transferred to ICU with all belongings placed on 2 belongings bag.   Transferred via bed.  Bedside report  given to ICU nurse on duty, Brynn HARRY.

## 2023-07-22 NOTE — PROGRESS NOTES
0202: Bedside report received from PIERO SHEA. Pt transferred to ICU rm [id number]. Attached to telemetry, SpO2, and BP monitoring. Bed alarm on, seizure precautions in place  0222: CIWA score upon arrival was 26, medicated per MAR.  0232: Precedex gtt started

## 2023-07-23 LAB
ALBUMIN SERPL BCP-MCNC: 3.3 G/DL (ref 3.2–4.9)
ALBUMIN/GLOB SERPL: 1.4 G/DL
ALP SERPL-CCNC: 107 U/L (ref 30–99)
ALT SERPL-CCNC: 32 U/L (ref 2–50)
ANION GAP SERPL CALC-SCNC: 7 MMOL/L (ref 7–16)
AST SERPL-CCNC: 42 U/L (ref 12–45)
BASOPHILS # BLD AUTO: 1.1 % (ref 0–1.8)
BASOPHILS # BLD: 0.04 K/UL (ref 0–0.12)
BILIRUB SERPL-MCNC: 0.4 MG/DL (ref 0.1–1.5)
BUN SERPL-MCNC: 5 MG/DL (ref 8–22)
CALCIUM ALBUM COR SERPL-MCNC: 9.9 MG/DL (ref 8.5–10.5)
CALCIUM SERPL-MCNC: 9.3 MG/DL (ref 8.4–10.2)
CHLORIDE SERPL-SCNC: 107 MMOL/L (ref 96–112)
CO2 SERPL-SCNC: 28 MMOL/L (ref 20–33)
CREAT SERPL-MCNC: 0.54 MG/DL (ref 0.5–1.4)
EOSINOPHIL # BLD AUTO: 0.18 K/UL (ref 0–0.51)
EOSINOPHIL NFR BLD: 5.1 % (ref 0–6.9)
ERYTHROCYTE [DISTWIDTH] IN BLOOD BY AUTOMATED COUNT: 49.1 FL (ref 35.9–50)
GFR SERPLBLD CREATININE-BSD FMLA CKD-EPI: 102 ML/MIN/1.73 M 2
GLOBULIN SER CALC-MCNC: 2.4 G/DL (ref 1.9–3.5)
GLUCOSE SERPL-MCNC: 114 MG/DL (ref 65–99)
HCT VFR BLD AUTO: 30.1 % (ref 37–47)
HGB BLD-MCNC: 9.8 G/DL (ref 12–16)
IMM GRANULOCYTES # BLD AUTO: 0.01 K/UL (ref 0–0.11)
IMM GRANULOCYTES NFR BLD AUTO: 0.3 % (ref 0–0.9)
LYMPHOCYTES # BLD AUTO: 0.92 K/UL (ref 1–4.8)
LYMPHOCYTES NFR BLD: 26.1 % (ref 22–41)
MAGNESIUM SERPL-MCNC: 1.7 MG/DL (ref 1.5–2.5)
MCH RBC QN AUTO: 32.3 PG (ref 27–33)
MCHC RBC AUTO-ENTMCNC: 32.6 G/DL (ref 32.2–35.5)
MCV RBC AUTO: 99.3 FL (ref 81.4–97.8)
MONOCYTES # BLD AUTO: 0.49 K/UL (ref 0–0.85)
MONOCYTES NFR BLD AUTO: 13.9 % (ref 0–13.4)
NEUTROPHILS # BLD AUTO: 1.89 K/UL (ref 1.82–7.42)
NEUTROPHILS NFR BLD: 53.5 % (ref 44–72)
NRBC # BLD AUTO: 0 K/UL
NRBC BLD-RTO: 0 /100 WBC (ref 0–0.2)
PHOSPHATE SERPL-MCNC: 3.8 MG/DL (ref 2.5–4.5)
PLATELET # BLD AUTO: 218 K/UL (ref 164–446)
PMV BLD AUTO: 11.2 FL (ref 9–12.9)
POTASSIUM SERPL-SCNC: 3.8 MMOL/L (ref 3.6–5.5)
PROT SERPL-MCNC: 5.7 G/DL (ref 6–8.2)
RBC # BLD AUTO: 3.03 M/UL (ref 4.2–5.4)
SODIUM SERPL-SCNC: 142 MMOL/L (ref 135–145)
WBC # BLD AUTO: 3.5 K/UL (ref 4.8–10.8)

## 2023-07-23 PROCEDURE — A9270 NON-COVERED ITEM OR SERVICE: HCPCS | Performed by: INTERNAL MEDICINE

## 2023-07-23 PROCEDURE — 700105 HCHG RX REV CODE 258: Mod: JZ | Performed by: FAMILY MEDICINE

## 2023-07-23 PROCEDURE — 700101 HCHG RX REV CODE 250: Performed by: INTERNAL MEDICINE

## 2023-07-23 PROCEDURE — 700102 HCHG RX REV CODE 250 W/ 637 OVERRIDE(OP): Performed by: INTERNAL MEDICINE

## 2023-07-23 PROCEDURE — 700111 HCHG RX REV CODE 636 W/ 250 OVERRIDE (IP): Performed by: INTERNAL MEDICINE

## 2023-07-23 PROCEDURE — 83735 ASSAY OF MAGNESIUM: CPT

## 2023-07-23 PROCEDURE — 80053 COMPREHEN METABOLIC PANEL: CPT

## 2023-07-23 PROCEDURE — 700102 HCHG RX REV CODE 250 W/ 637 OVERRIDE(OP): Performed by: FAMILY MEDICINE

## 2023-07-23 PROCEDURE — 84100 ASSAY OF PHOSPHORUS: CPT

## 2023-07-23 PROCEDURE — 99291 CRITICAL CARE FIRST HOUR: CPT | Performed by: INTERNAL MEDICINE

## 2023-07-23 PROCEDURE — 94760 N-INVAS EAR/PLS OXIMETRY 1: CPT

## 2023-07-23 PROCEDURE — 770022 HCHG ROOM/CARE - ICU (200)

## 2023-07-23 PROCEDURE — 85025 COMPLETE CBC W/AUTO DIFF WBC: CPT

## 2023-07-23 PROCEDURE — A9270 NON-COVERED ITEM OR SERVICE: HCPCS | Performed by: FAMILY MEDICINE

## 2023-07-23 RX ORDER — CHLORDIAZEPOXIDE HYDROCHLORIDE 25 MG/1
25 CAPSULE, GELATIN COATED ORAL EVERY 6 HOURS
Status: COMPLETED | OUTPATIENT
Start: 2023-07-24 | End: 2023-07-25

## 2023-07-23 RX ORDER — LORAZEPAM 2 MG/ML
1-2 INJECTION INTRAMUSCULAR
Status: DISCONTINUED | OUTPATIENT
Start: 2023-07-23 | End: 2023-07-24

## 2023-07-23 RX ORDER — POTASSIUM CHLORIDE 1500 MG/1
40 TABLET, EXTENDED RELEASE ORAL ONCE
Status: COMPLETED | OUTPATIENT
Start: 2023-07-23 | End: 2023-07-23

## 2023-07-23 RX ORDER — DEXMEDETOMIDINE HYDROCHLORIDE 4 UG/ML
.1-1 INJECTION, SOLUTION INTRAVENOUS CONTINUOUS
Status: DISCONTINUED | OUTPATIENT
Start: 2023-07-23 | End: 2023-07-24

## 2023-07-23 RX ORDER — MAGNESIUM SULFATE HEPTAHYDRATE 40 MG/ML
2 INJECTION, SOLUTION INTRAVENOUS ONCE
Status: COMPLETED | OUTPATIENT
Start: 2023-07-23 | End: 2023-07-23

## 2023-07-23 RX ORDER — CHLORDIAZEPOXIDE HYDROCHLORIDE 25 MG/1
50 CAPSULE, GELATIN COATED ORAL EVERY 6 HOURS
Status: COMPLETED | OUTPATIENT
Start: 2023-07-23 | End: 2023-07-23

## 2023-07-23 RX ADMIN — LORAZEPAM 1 MG: 2 INJECTION INTRAMUSCULAR; INTRAVENOUS at 14:15

## 2023-07-23 RX ADMIN — THERA TABS 1 TABLET: TAB at 05:38

## 2023-07-23 RX ADMIN — LEVETIRACETAM 500 MG: 500 TABLET, FILM COATED ORAL at 05:38

## 2023-07-23 RX ADMIN — SODIUM CHLORIDE, POTASSIUM CHLORIDE, SODIUM LACTATE AND CALCIUM CHLORIDE: 600; 310; 30; 20 INJECTION, SOLUTION INTRAVENOUS at 01:56

## 2023-07-23 RX ADMIN — CHLORDIAZEPOXIDE HYDROCHLORIDE 50 MG: 25 CAPSULE ORAL at 11:51

## 2023-07-23 RX ADMIN — SENNOSIDES AND DOCUSATE SODIUM 2 TABLET: 50; 8.6 TABLET ORAL at 05:38

## 2023-07-23 RX ADMIN — DULOXETINE HYDROCHLORIDE 60 MG: 30 CAPSULE, DELAYED RELEASE ORAL at 05:38

## 2023-07-23 RX ADMIN — GABAPENTIN 300 MG: 300 CAPSULE ORAL at 05:38

## 2023-07-23 RX ADMIN — FOLIC ACID 1 MG: 1 TABLET ORAL at 05:38

## 2023-07-23 RX ADMIN — MORPHINE SULFATE 4 MG: 4 INJECTION INTRAVENOUS at 00:57

## 2023-07-23 RX ADMIN — LORAZEPAM 3 MG: 1 TABLET ORAL at 00:56

## 2023-07-23 RX ADMIN — LORAZEPAM 2 MG: 2 INJECTION INTRAMUSCULAR; INTRAVENOUS at 22:48

## 2023-07-23 RX ADMIN — CHLORDIAZEPOXIDE HYDROCHLORIDE 50 MG: 25 CAPSULE ORAL at 17:47

## 2023-07-23 RX ADMIN — CHLORDIAZEPOXIDE HYDROCHLORIDE 25 MG: 25 CAPSULE ORAL at 05:38

## 2023-07-23 RX ADMIN — CHLORDIAZEPOXIDE HYDROCHLORIDE 50 MG: 25 CAPSULE ORAL at 23:58

## 2023-07-23 RX ADMIN — RIVAROXABAN 10 MG: 10 TABLET, FILM COATED ORAL at 17:47

## 2023-07-23 RX ADMIN — LORAZEPAM 1 MG: 2 INJECTION INTRAMUSCULAR; INTRAVENOUS at 20:35

## 2023-07-23 RX ADMIN — POTASSIUM CHLORIDE 40 MEQ: 1500 TABLET, EXTENDED RELEASE ORAL at 09:16

## 2023-07-23 RX ADMIN — THIAMINE HCL TAB 100 MG 100 MG: 100 TAB at 05:38

## 2023-07-23 RX ADMIN — MAGNESIUM SULFATE HEPTAHYDRATE 2 G: 2 INJECTION, SOLUTION INTRAVENOUS at 09:21

## 2023-07-23 RX ADMIN — DEXMEDETOMIDINE HYDROCHLORIDE 0.8 MCG/KG/HR: 400 INJECTION INTRAVENOUS at 01:57

## 2023-07-23 RX ADMIN — Medication 5 MG: at 20:35

## 2023-07-23 RX ADMIN — METOPROLOL SUCCINATE 25 MG: 25 TABLET, EXTENDED RELEASE ORAL at 05:38

## 2023-07-23 RX ADMIN — LEVETIRACETAM 500 MG: 500 TABLET, FILM COATED ORAL at 17:47

## 2023-07-23 RX ADMIN — OXYCODONE 5 MG: 5 TABLET ORAL at 22:48

## 2023-07-23 RX ADMIN — CHLORDIAZEPOXIDE HYDROCHLORIDE 50 MG: 25 CAPSULE ORAL at 09:16

## 2023-07-23 RX ADMIN — MIRTAZAPINE 30 MG: 15 TABLET, ORALLY DISINTEGRATING ORAL at 20:35

## 2023-07-23 ASSESSMENT — LIFESTYLE VARIABLES
TOTAL SCORE: 10
AUDITORY DISTURBANCES: NOT PRESENT
NAUSEA AND VOMITING: NO NAUSEA AND NO VOMITING
HEADACHE, FULLNESS IN HEAD: NOT PRESENT
NAUSEA AND VOMITING: NO NAUSEA AND NO VOMITING
VISUAL DISTURBANCES: NOT PRESENT
ORIENTATION AND CLOUDING OF SENSORIUM: DATE DISORIENTATION BY NO MORE THAN TWO CALENDAR DAYS
TREMOR: *
ORIENTATION AND CLOUDING OF SENSORIUM: CANNOT DO SERIAL ADDITIONS OR IS UNCERTAIN ABOUT DATE
NAUSEA AND VOMITING: NO NAUSEA AND NO VOMITING
AUDITORY DISTURBANCES: NOT PRESENT
TOTAL SCORE: 20
TOTAL SCORE: 7
ANXIETY: MILDLY ANXIOUS
NAUSEA AND VOMITING: NO NAUSEA AND NO VOMITING
AUDITORY DISTURBANCES: NOT PRESENT
ORIENTATION AND CLOUDING OF SENSORIUM: ORIENTED AND CAN DO SERIAL ADDITIONS
HEADACHE, FULLNESS IN HEAD: MODERATELY SEVERE
PAROXYSMAL SWEATS: NO SWEAT VISIBLE
VISUAL DISTURBANCES: NOT PRESENT
VISUAL DISTURBANCES: NOT PRESENT
AGITATION: MODERATELY FIDGETY AND RESTLESS
VISUAL DISTURBANCES: NOT PRESENT
TREMOR: *
ANXIETY: NO ANXIETY (AT EASE)
PAROXYSMAL SWEATS: NO SWEAT VISIBLE
HEADACHE, FULLNESS IN HEAD: VERY MILD
PAROXYSMAL SWEATS: NO SWEAT VISIBLE
ORIENTATION AND CLOUDING OF SENSORIUM: DATE DISORIENTATION BY NO MORE THAN TWO CALENDAR DAYS
ANXIETY: NO ANXIETY (AT EASE)
TOTAL SCORE: VERY MILD ITCHING, PINS AND NEEDLES SENSATION, BURNING OR NUMBNESS
PAROXYSMAL SWEATS: BARELY PERCEPTIBLE SWEATING. PALMS MOIST
PAROXYSMAL SWEATS: NO SWEAT VISIBLE
TOTAL SCORE: 4
NAUSEA AND VOMITING: NO NAUSEA AND NO VOMITING
AUDITORY DISTURBANCES: VERY MILD HARSHNESS OR ABILITY TO FRIGHTEN
TOTAL SCORE: 4
VISUAL DISTURBANCES: NOT PRESENT
HEADACHE, FULLNESS IN HEAD: MILD
AUDITORY DISTURBANCES: NOT PRESENT
NAUSEA AND VOMITING: NO NAUSEA AND NO VOMITING
AGITATION: NORMAL ACTIVITY
TOTAL SCORE: 4
HEADACHE, FULLNESS IN HEAD: NOT PRESENT
TREMOR: *
PAROXYSMAL SWEATS: NO SWEAT VISIBLE
TREMOR: *
AGITATION: NORMAL ACTIVITY
ORIENTATION AND CLOUDING OF SENSORIUM: CANNOT DO SERIAL ADDITIONS OR IS UNCERTAIN ABOUT DATE
NAUSEA AND VOMITING: MILD NAUSEA WITH NO VOMITING
TREMOR: SEVERE TREMOR, EVEN WITH ARMS NOT EXTENDED
PAROXYSMAL SWEATS: NO SWEAT VISIBLE
ANXIETY: NO ANXIETY (AT EASE)
ANXIETY: NO ANXIETY (AT EASE)
ANXIETY: MILDLY ANXIOUS
ORIENTATION AND CLOUDING OF SENSORIUM: ORIENTED AND CAN DO SERIAL ADDITIONS
TREMOR: *
TREMOR: *
AUDITORY DISTURBANCES: NOT PRESENT
VISUAL DISTURBANCES: NOT PRESENT
AGITATION: *
AUDITORY DISTURBANCES: NOT PRESENT
HEADACHE, FULLNESS IN HEAD: NOT PRESENT
ORIENTATION AND CLOUDING OF SENSORIUM: CANNOT DO SERIAL ADDITIONS OR IS UNCERTAIN ABOUT DATE
AGITATION: NORMAL ACTIVITY
VISUAL DISTURBANCES: NOT PRESENT
ANXIETY: *
HEADACHE, FULLNESS IN HEAD: NOT PRESENT
AGITATION: NORMAL ACTIVITY
TOTAL SCORE: 5
AGITATION: NORMAL ACTIVITY

## 2023-07-23 ASSESSMENT — PAIN DESCRIPTION - PAIN TYPE
TYPE: ACUTE PAIN

## 2023-07-23 ASSESSMENT — ENCOUNTER SYMPTOMS: BACK PAIN: 1

## 2023-07-23 NOTE — PROGRESS NOTES
12-hour chart check complete.    Monitor Summary  Rhythm: SR-STACH  Rate:   Ectopy: RARE PVC'S  Measurements: 0.20/0.08/0.40

## 2023-07-23 NOTE — CARE PLAN
The patient is Watcher - Medium risk of patient condition declining or worsening    Shift Goals  Clinical Goals: CIWA, pain management  Patient Goals: Go home  Family Goals: KUN    Progress made toward(s) clinical / shift goals:      Patient is not progressing towards the following goals:      Problem: Pain - Standard  Goal: Alleviation of pain or a reduction in pain to the patient’s comfort goal  Outcome: Progressing     Problem: Knowledge Deficit - Standard  Goal: Patient and family/care givers will demonstrate understanding of plan of care, disease process/condition, diagnostic tests and medications  Outcome: Progressing     Problem: Fall Risk  Goal: Patient will remain free from falls  Outcome: Progressing     Problem: Psychosocial  Goal: Patient's level of anxiety will decrease  Outcome: Progressing  Goal: Patient's ability to verbalize feelings about condition will improve  Outcome: Progressing     Problem: Hemodynamics  Goal: Patient's hemodynamics, fluid balance and neurologic status will be stable or improve  Outcome: Progressing     Problem: Nutrition  Goal: Patient's nutritional and fluid intake will be adequate or improve  Outcome: Progressing     Problem: Optimal Care for Alcohol Withdrawal  Goal: Optimal Care for the alcohol withdrawal patient  Outcome: Progressing     Problem: Seizure Precautions  Goal: Implementation of seizure precautions  Outcome: Progressing     Problem: Lifestyle Changes  Goal: Patient's ability to identify lifestyle changes and available resources to help reduce recurrence of condition will improve  Outcome: Progressing     Problem: Psychosocial  Goal: Patient's level of anxiety will decrease  Outcome: Progressing  Goal: Spiritual and cultural needs incorporated into hospitalization  Outcome: Progressing     Problem: Risk for Aspiration  Goal: Patient's risk for aspiration will be absent or decrease  Outcome: Progressing     Problem: Skin Integrity  Goal: Skin integrity is  maintained or improved  Outcome: Progressing

## 2023-07-23 NOTE — ASSESSMENT & PLAN NOTE
Alcohol withdrawal with delirium, with history of non-alcohol related seizures  Wean precedex gtt, ativan ladder per CIWA/EtOH withdrawal protocol  Start PO librium  S/p rally bag, cont multivitamins  ICU electrolyte repletion protocol  Seizure precautions  Protecting airway adequately, cont PO diet for now

## 2023-07-23 NOTE — ASSESSMENT & PLAN NOTE
S/p NAC  Avoid acetaminophen for pain control for coccyx injury  Refuses NSAIDS due to hx of IBS  Trial low dose tramadol

## 2023-07-23 NOTE — CARE PLAN
The patient is Watcher - Medium risk of patient condition declining or worsening    Shift Goals  Clinical Goals: CIWA, pain management  Patient Goals: Go home  Family Goals: KUN    Progress made toward(s) clinical / shift goals:    Problem: Pain - Standard  Goal: Alleviation of pain or a reduction in pain to the patient’s comfort goal  Outcome: Progressing     Problem: Fall Risk  Goal: Patient will remain free from falls  Outcome: Progressing     Problem: Psychosocial  Goal: Patient's level of anxiety will decrease  Outcome: Progressing     Problem: Hemodynamics  Goal: Patient's hemodynamics, fluid balance and neurologic status will be stable or improve  Outcome: Progressing     Problem: Psychosocial  Goal: Patient's level of anxiety will decrease  Outcome: Progressing  RASS decreased after restarting precedex. Patient more subdued and not trying to climb out of bed.       Patient is not progressing towards the following goals:      Problem: Optimal Care for Alcohol Withdrawal  Goal: Optimal Care for the alcohol withdrawal patient  Outcome: Not Progressing  Had to restart Precedex due to increasing CIWA and RASS scores

## 2023-07-23 NOTE — PROGRESS NOTES
Critical Care Progress Note    Date of admission  7/19/2023    Chief Complaint  67 y.o. female admitted 7/19/2023 with Acute alcohol withdrawal and concern for acetamiophen toxicity    Hospital Course  67 y.o. female who presented 7/19/2023 with complaints of chest and sacral pain.  She had a ground-level fall last month, broke her coccyx, and has been in significant pain since then.  To control the pain she has been taking high-dose Tylenol 4 to 8 g/day as well as alcohol drinking 6-8 shots per day.  He has a history of requiring narcotics for long-term so she has been avoiding these.  She has a history of dyslipidemia, seizures on Keppra, anxiety and depression on Cymbalta and Remeron     Serum Tylenol level was undetectably low on admission.  Checked twice.  Patient control was contacted recommending NAC despite low levels.  She had mildly elevated transaminases.  Otherwise no fulminant signs of liver failure.  Serum EtOH 43.  The course of admission she developed signs and symptoms of alcohol withdrawal was started on Ativan.  Overnight her withdrawal symptoms progressively got worse CIWA 20+ and difficult to control so she was transferred to the ICU for Precedex drip.     Interval Problem Update  Chart review from the past 24 hours includes imaging, laboratory studies, vital signs and notes available.  Pertinent data for today's visit includes    Overnight had increasing CIWA/RASS scores despite initiation of librium  Caught vaping by nursing staff overnight  Transferred back to ICU and restarted on precedex    Cardiac: tachycardic overnight stabilized, hypertension resolved as well by this AM  Pulm: 1LNC, stable  Neuro: precedex gtt low rate, RASS -2/-3, precedex weaning. Tramadol/oxy for coccyx pain  Heme: anemic, 11.2 -> 9.8  Renal/volume status: reviewed  ID:  no issues   GI/endo: mildly elevated AST > ALT improving  Labs/Imaging: reviewed    Review of Systems  Review of Systems   Musculoskeletal:  Positive  for back pain.   All other systems reviewed and are negative.     Vital Signs for last 24 hours   Temp:  [35.6 °C (96 °F)-36.6 °C (97.8 °F)] 36.5 °C (97.7 °F)  Pulse:  [] 86  Resp:  [14-75] 16  BP: (107-167)/() 127/73  SpO2:  [90 %-100 %] 97 %    Physical Exam   Physical Exam  Vitals and nursing note reviewed.   Constitutional:       General: She is not in acute distress.     Appearance: Normal appearance. She is well-developed. She is not diaphoretic.      Comments: Very pleasant   Eyes:      General: No scleral icterus.        Right eye: No discharge.         Left eye: No discharge.      Conjunctiva/sclera: Conjunctivae normal.      Pupils: Pupils are equal, round, and reactive to light.   Neck:      Thyroid: No thyromegaly.      Vascular: No JVD.   Cardiovascular:      Rate and Rhythm: Normal rate and regular rhythm.      Heart sounds: Normal heart sounds. No murmur heard.     No gallop.   Pulmonary:      Effort: Pulmonary effort is normal. No respiratory distress.      Breath sounds: Normal breath sounds. No wheezing or rales.   Abdominal:      General: There is no distension.      Palpations: Abdomen is soft.      Tenderness: There is no abdominal tenderness. There is no guarding.   Musculoskeletal:         General: No tenderness.   Lymphadenopathy:      Cervical: No cervical adenopathy.   Skin:     General: Skin is warm.      Capillary Refill: Capillary refill takes less than 2 seconds.      Findings: No erythema or rash.   Neurological:      Mental Status: She is alert and oriented to person, place, and time.      Cranial Nerves: No cranial nerve deficit.      Sensory: No sensory deficit.      Motor: Tremor present. No abnormal muscle tone.   Psychiatric:         Attention and Perception: Attention and perception normal.         Mood and Affect: Mood and affect normal.         Speech: Speech normal.         Behavior: Behavior normal. Behavior is cooperative.         Thought Content: Thought  content normal. Thought content is not delusional.       Medications  Current Facility-Administered Medications   Medication Dose Route Frequency Provider Last Rate Last Admin    chlordiazePOXIDE (Librium) capsule 50 mg  50 mg Oral Q6HRS Ed Esteban M.D.        Followed by    [START ON 7/24/2023] chlordiazePOXIDE (Librium) capsule 25 mg  25 mg Oral Q6HRS Ed Esteban M.D.        dexmedetomidine (PRECEDEX) 400 mcg/100mL NS premix infusion  0.1-1 mcg/kg/hr (Ideal) Intravenous Continuous Ed Esteban M.D.   Held at 07/23/23 0830    LORazepam (Ativan) injection 1-2 mg  1-2 mg Intravenous Q2HRS PRN Ed Esteban M.D.        traMADol (Ultram) 50 MG tablet 50 mg  50 mg Oral Q6HRS PRN Ed Esteban M.D.   50 mg at 07/22/23 2026    folic acid (Folvite) tablet 1 mg  1 mg Oral DAILY JAYA BettsO.   1 mg at 07/23/23 0538    thiamine (Vitamin B-1) tablet 100 mg  100 mg Oral DAILY JAYA BettsO.   100 mg at 07/23/23 0538    multivitamin tablet 1 Tablet  1 Tablet Oral DAILY JAYA BettsO.   1 Tablet at 07/23/23 0538    sodium chloride (Ocean) 0.65 % nasal spray 2 Spray  2 Spray Nasal Q2HRS PRN JAYA BettsOChiquis        lactated ringers infusion   Intravenous Continuous Archana Franco M.D. 83 mL/hr at 07/23/23 0800 Rate Verify at 07/23/23 0800    DULoxetine (Cymbalta) capsule 60 mg  60 mg Oral DAILY JAYA BettsO.   60 mg at 07/23/23 0538    gabapentin (Neurontin) capsule 300 mg  300 mg Oral DAILY JAYA BettsO.   300 mg at 07/23/23 0538    levETIRAcetam (Keppra) tablet 500 mg  500 mg Oral BID JAYA BettsO.   500 mg at 07/23/23 0538    metoprolol SR (Toprol XL) tablet 25 mg  25 mg Oral DAILY JAYA BettsO.   25 mg at 07/23/23 0538    mirtazapine (Remeron) orally disintegrating tab 30 mg  30 mg Oral Nightly Leonel Rucker D.O.   30 mg at 07/22/23 2026    senna-docusate (Pericolace Or Senokot S) 8.6-50 MG per tablet 2 Tablet  2 Tablet Oral BID eLonel TRINIDAD  JULIA Rucker   2 Tablet at 07/23/23 0538    And    polyethylene glycol/lytes (Miralax) PACKET 1 Packet  1 Packet Oral QDAY PRN Leonel Rucker D.O.        And    magnesium hydroxide (Milk Of Magnesia) suspension 30 mL  30 mL Oral QDAY PRN Leonel Rucker D.O.        And    bisacodyl (Dulcolax) suppository 10 mg  10 mg Rectal QDAY PRN Leonel Rucker D.O.        rivaroxaban (Xarelto) tablet 10 mg  10 mg Oral DAILY AT 1800 Leonle Rucker D.O.   10 mg at 07/22/23 1754    Pharmacy Consult Request ...Pain Management Review 1 Each  1 Each Other PHARMACY TO DOSE Leonel Rucker D.O.        oxyCODONE immediate-release (Roxicodone) tablet 5 mg  5 mg Oral Q3HRS PRN JAYA BettsOChiquis   5 mg at 07/22/23 2223    Or    oxyCODONE immediate release (Roxicodone) tablet 10 mg  10 mg Oral Q3HRS PRN JAYA BettsOChiquis   10 mg at 07/21/23 2357    Or    morphine 4 MG/ML injection 4 mg  4 mg Intravenous Q3HRS PRN JAYA BettsOChiquis   4 mg at 07/23/23 0057    labetalol (Normodyne/Trandate) injection 10 mg  10 mg Intravenous Q4HRS PRN Leonel Rucker D.O.        ondansetron (Zofran) syringe/vial injection 4 mg  4 mg Intravenous Q4HRS PRN Leonel Rucker D.O.        ondansetron (Zofran ODT) dispertab 4 mg  4 mg Oral Q4HRS PRN Leonel Rucker D.O.         Fluids    Intake/Output Summary (Last 24 hours) at 7/23/2023 0839  Last data filed at 7/23/2023 0800  Gross per 24 hour   Intake 2366.08 ml   Output 525 ml   Net 1841.08 ml     Laboratory          Recent Labs     07/20/23  1730 07/21/23  0222 07/23/23  0416   SODIUM 136 136 142   POTASSIUM 4.6 3.3* 3.8   CHLORIDE 98 97 107   CO2 28 27 28   BUN 10 8 5*   CREATININE 0.58 0.59 0.54   MAGNESIUM  --  1.3* 1.7   PHOSPHORUS  --  3.4 3.8   CALCIUM 9.1 9.2 9.3     Recent Labs     07/20/23  1730 07/21/23  0222 07/21/23  1411 07/23/23  0416   ALTSGPT 52* 48 43 32   ASTSGOT 109* 84* 60* 42   ALKPHOSPHAT 136* 128* 124* 107*   TBILIRUBIN 0.8 0.7 0.6 0.4   DBILIRUBIN  --   --  <0.2  --     LIPASE  --  111*  --   --    GLUCOSE 131* 105*  --  114*     Recent Labs     07/21/23  0222 07/21/23  1411 07/23/23  0416   WBC 3.9*  --  3.5*   NEUTSPOLYS 52.80  --  53.50   LYMPHOCYTES 29.10  --  26.10   MONOCYTES 11.70  --  13.90*   EOSINOPHILS 4.30  --  5.10   BASOPHILS 1.80  --  1.10   ASTSGOT 84* 60* 42   ALTSGPT 48 43 32   ALKPHOSPHAT 128* 124* 107*   TBILIRUBIN 0.7 0.6 0.4     Recent Labs     07/20/23  1617 07/21/23  0222 07/21/23  1411 07/23/23  0416   RBC  --  3.35*  --  3.03*   HEMOGLOBIN  --  10.8* 11.2* 9.8*   HEMATOCRIT  --  32.9* 33.8* 30.1*   PLATELETCT  --  219  --  218   PROTHROMBTM 15.2* 18.5*  --   --    INR 1.14* 1.46*  --   --    IRON  --  151  --   --    FERRITIN  --  1379.0*  --   --    TOTIRONBC  --  243*  --   --      Imaging  X-Ray:  No film today    Assessment/Plan    * Acute alcohol intoxication with alcoholism with delirium (HCC)- (present on admission)  Assessment & Plan  Alcohol withdrawal with delirium, with history of non-alcohol related seizures  Wean precedex gtt, ativan ladder per CIWA/EtOH withdrawal protocol  Start PO librium  S/p rally bag, cont multivitamins  ICU electrolyte repletion protocol  Seizure precautions  Protecting airway adequately, cont PO diet for now    Tylenol overdose, accidental or unintentional, initial encounter- (present on admission)  Assessment & Plan  S/p NAC  Avoid acetaminophen for pain control for coccyx injury  Refuses NSAIDS due to hx of IBS  Trial low dose tramadol    Seizures (HCC)- (present on admission)  Assessment & Plan  History of  Continue keppra, neurontin  Seizure precautions  Plan of care as outlined above.    Anxiety and depression- (present on admission)  Assessment & Plan  Continue home Cymbalta and Remeron   Cont as needed Ativan       VTE:  NOAC  Ulcer: Not Indicated  Lines: None    I have performed a physical exam and reviewed and updated ROS and Plan today (7/23/2023). In review of yesterday's note (7/22/2023), there are no  changes except as documented above.     Discussed patient condition and risk of morbidity and/or mortality with RN, Pharmacy, Charge nurse / hot rounds, and Patient    The patient remains critically ill.  Critical care time = 41 minutes in directly providing and coordinating critical care and extensive data review.  No time overlap and excludes procedures.    This note was generated using voice recognition software which has a chance of producing errors of grammar and content.  I have made every reasonable attempt to find and correct any errors, but it should be expected that some may not be found prior to finalization of this note.  __________  Ed Esteban MD  Pulmonary and Critical Care Medicine  [facility]

## 2023-07-23 NOTE — PROGRESS NOTES
[facility] Medicine Daily Progress Note    Date of Service  7/22/2023    Chief Complaint  Odalys Keenan is a 67 y.o. female admitted 7/19/2023 with concern for Tylenol overdose    Hospital Course  No notes on file    Interval Problem Update  Patient again having worsening alcohol withdrawal.  Precedex has been stopped earlier this afternoon, she began worsening short time ago.  Patient was longer ICU status however was still in the ICU, transition back to ICU status and started on Precedex drip.        Consultants/Specialty  None    Code Status  Full Code    Disposition  Medically Cleared  I have placed the appropriate orders for post-discharge needs.    Review of Systems  Review of Systems   Unable to perform ROS: Acuity of condition        Physical Exam  Temp:  [35.8 °C (96.5 °F)-36.7 °C (98.1 °F)] 36.6 °C (97.8 °F)  Pulse:  [] 113  Resp:  [13-75] 35  BP: ()/() 154/93  SpO2:  [90 %-100 %] 96 %    Physical Exam  Vitals and nursing note reviewed.   Constitutional:       General: She is not in acute distress.     Appearance: She is well-developed and normal weight. She is not toxic-appearing.   HENT:      Head: Normocephalic and atraumatic.      Right Ear: External ear normal.      Left Ear: External ear normal.      Nose: Nose normal. No congestion or rhinorrhea.      Mouth/Throat:      Mouth: Mucous membranes are dry.      Pharynx: No oropharyngeal exudate.   Eyes:      General:         Right eye: No discharge.         Left eye: No discharge.   Neck:      Trachea: No tracheal deviation.   Cardiovascular:      Rate and Rhythm: Normal rate and regular rhythm.   Pulmonary:      Effort: Pulmonary effort is normal. No respiratory distress.   Abdominal:      General: Bowel sounds are normal. There is no distension.   Musculoskeletal:      Right lower leg: No edema.      Left lower leg: No edema.   Lymphadenopathy:      Cervical: No cervical adenopathy.   Skin:     General: Skin is warm and dry.       Findings: No erythema or rash.   Neurological:      Comments: Nonverbal    Psychiatric:         Speech: She is noncommunicative.         Fluids    Intake/Output Summary (Last 24 hours) at 7/23/2023 0015  Last data filed at 7/22/2023 2000  Gross per 24 hour   Intake 2311.39 ml   Output --   Net 2311.39 ml         Laboratory  Recent Labs     07/20/23  0358 07/21/23  0222 07/21/23  1411   WBC 5.5 3.9*  --    RBC 3.98* 3.35*  --    HEMOGLOBIN 12.8 10.8* 11.2*   HEMATOCRIT 39.1 32.9* 33.8*   MCV 98.2* 98.2*  --    MCH 32.2 32.2  --    MCHC 32.7 32.8  --    RDW 49.7 48.4  --    PLATELETCT 277 219  --    MPV 10.5 11.2  --        Recent Labs     07/20/23  0358 07/20/23  1730 07/21/23  0222   SODIUM 140 136 136   POTASSIUM 3.6 4.6 3.3*   CHLORIDE 100 98 97   CO2 24 28 27   GLUCOSE 130* 131* 105*   BUN 10 10 8   CREATININE 0.65 0.58 0.59   CALCIUM 9.1 9.1 9.2       Recent Labs     07/20/23  1617 07/21/23  0222   INR 1.14* 1.46*                 Imaging  IR-US GUIDED PIV   Final Result    Ultrasound-guided PERIPHERAL IV INSERTION performed by    qualified nursing staff as above.      US-RUQ   Final Result      1.  Hepatic steatosis   2.  Minimally distended gallbladder but no signs of acute inflammation and no cholelithiasis. This is of doubtful clinical significance.      CT-CTA CHEST PULMONARY ARTERY W/ RECONS   Final Result      1.  There is no CT evidence of acute pulmonary embolism.   2.  No pneumothorax, pneumonia or pleural effusion.   3.  Small peripheral micronodules likely postinflammatory. No follow-up imaging is indicated per Fleischner guideline.   4.  Hepatic steatosis.            DX-CHEST-PORTABLE (1 VIEW)   Final Result      1.  There is no acute cardiopulmonary process.             Assessment/Plan  * Acute alcohol intoxication with alcoholism with delirium (HCC)- (present on admission)  Assessment & Plan  Now again with severe alcohol withdrawal  I will restart Precedex drip and put patient suspect  ICU  Patient was very responsive last night to Precedex, I do anticipate quick improvement  When she is able be weaned off Precedex, she will still need Ativan  Closely monitor in the ICU    Patient is critically ill.   The patient continues to have : Alcohol withdrawal  The vital organ system that is effected is the: All are at risk but neuro initially  If untreated there is a high chance of deterioration into: Seizures  The critical care that I am providing today is: Precedex drip  The critical care that has been undertaken is medically complex.   There has been no overlap in critical care time.  Critical care time not including procedures, no overlap: 37 minutes    Tylenol overdose, accidental or unintentional, initial encounter- (present on admission)  Assessment & Plan  - Pt was taking 4gm of Tylenol daily, took 8gm daily for four days in June per patient   - Poison control recommending NAC despite Tylenol levels <5 x 2, recommending to continue until LFTs are back to normal but I do not expect pt's LFTs to return to normal levels given her 6-10 drinks daily.  Will continue NAC until her LFTs plateau or otherwise dictated by Poison Control    Dyslipidemia- (present on admission)  Assessment & Plan  Hold statin    Seizures (HCC)- (present on admission)  Assessment & Plan  Continue home Keppra    Anxiety and depression- (present on admission)  Assessment & Plan  Continue home Cymbalta and Remeron   start as needed Ativan    Elevated liver enzymes- (present on admission)  Assessment & Plan  Possibly multifactorial in patient who is taking significant mount of Tylenol and is drinking significant mount of alcohol  Tylenol level is negative x 2, this appears to be almost all EtOH related   Certainly could be chronic, no additional labs in our system  Repeat CMP in the morning         VTE prophylaxis: SCDs/TEDs and Xarelto 10 mg daily as prophylaxis    I have performed a physical exam and reviewed and updated ROS and  Plan today (7/22/2023). In review of yesterday's note (7/21/2023), there are no changes except as documented above.

## 2023-07-24 LAB
ALBUMIN SERPL BCP-MCNC: 3.3 G/DL (ref 3.2–4.9)
ALBUMIN/GLOB SERPL: 1.4 G/DL
ALP SERPL-CCNC: 106 U/L (ref 30–99)
ALT SERPL-CCNC: 29 U/L (ref 2–50)
ANION GAP SERPL CALC-SCNC: 9 MMOL/L (ref 7–16)
AST SERPL-CCNC: 33 U/L (ref 12–45)
BASOPHILS # BLD AUTO: 1.4 % (ref 0–1.8)
BASOPHILS # BLD: 0.05 K/UL (ref 0–0.12)
BILIRUB SERPL-MCNC: 0.3 MG/DL (ref 0.1–1.5)
BUN SERPL-MCNC: 6 MG/DL (ref 8–22)
CALCIUM ALBUM COR SERPL-MCNC: 10.1 MG/DL (ref 8.5–10.5)
CALCIUM SERPL-MCNC: 9.5 MG/DL (ref 8.4–10.2)
CHLORIDE SERPL-SCNC: 106 MMOL/L (ref 96–112)
CO2 SERPL-SCNC: 25 MMOL/L (ref 20–33)
CREAT SERPL-MCNC: 0.58 MG/DL (ref 0.5–1.4)
EOSINOPHIL # BLD AUTO: 0.19 K/UL (ref 0–0.51)
EOSINOPHIL NFR BLD: 5.2 % (ref 0–6.9)
ERYTHROCYTE [DISTWIDTH] IN BLOOD BY AUTOMATED COUNT: 49.4 FL (ref 35.9–50)
GFR SERPLBLD CREATININE-BSD FMLA CKD-EPI: 100 ML/MIN/1.73 M 2
GLOBULIN SER CALC-MCNC: 2.4 G/DL (ref 1.9–3.5)
GLUCOSE SERPL-MCNC: 112 MG/DL (ref 65–99)
HCT VFR BLD AUTO: 28.6 % (ref 37–47)
HGB BLD-MCNC: 9.2 G/DL (ref 12–16)
IMM GRANULOCYTES # BLD AUTO: 0.02 K/UL (ref 0–0.11)
IMM GRANULOCYTES NFR BLD AUTO: 0.6 % (ref 0–0.9)
LYMPHOCYTES # BLD AUTO: 0.95 K/UL (ref 1–4.8)
LYMPHOCYTES NFR BLD: 26.2 % (ref 22–41)
MAGNESIUM SERPL-MCNC: 1.8 MG/DL (ref 1.5–2.5)
MCH RBC QN AUTO: 32.5 PG (ref 27–33)
MCHC RBC AUTO-ENTMCNC: 32.2 G/DL (ref 32.2–35.5)
MCV RBC AUTO: 101.1 FL (ref 81.4–97.8)
MONOCYTES # BLD AUTO: 0.57 K/UL (ref 0–0.85)
MONOCYTES NFR BLD AUTO: 15.7 % (ref 0–13.4)
NEUTROPHILS # BLD AUTO: 1.84 K/UL (ref 1.82–7.42)
NEUTROPHILS NFR BLD: 50.9 % (ref 44–72)
NRBC # BLD AUTO: 0 K/UL
NRBC BLD-RTO: 0 /100 WBC (ref 0–0.2)
PHOSPHATE SERPL-MCNC: 3.6 MG/DL (ref 2.5–4.5)
PLATELET # BLD AUTO: 218 K/UL (ref 164–446)
PMV BLD AUTO: 11 FL (ref 9–12.9)
POTASSIUM SERPL-SCNC: 3.7 MMOL/L (ref 3.6–5.5)
PROT SERPL-MCNC: 5.7 G/DL (ref 6–8.2)
RBC # BLD AUTO: 2.83 M/UL (ref 4.2–5.4)
SODIUM SERPL-SCNC: 140 MMOL/L (ref 135–145)
WBC # BLD AUTO: 3.6 K/UL (ref 4.8–10.8)

## 2023-07-24 PROCEDURE — 94760 N-INVAS EAR/PLS OXIMETRY 1: CPT

## 2023-07-24 PROCEDURE — 770020 HCHG ROOM/CARE - TELE (206)

## 2023-07-24 PROCEDURE — 700105 HCHG RX REV CODE 258: Mod: JZ | Performed by: FAMILY MEDICINE

## 2023-07-24 PROCEDURE — 85025 COMPLETE CBC W/AUTO DIFF WBC: CPT

## 2023-07-24 PROCEDURE — 80053 COMPREHEN METABOLIC PANEL: CPT

## 2023-07-24 PROCEDURE — 84100 ASSAY OF PHOSPHORUS: CPT

## 2023-07-24 PROCEDURE — 83735 ASSAY OF MAGNESIUM: CPT

## 2023-07-24 PROCEDURE — 99233 SBSQ HOSP IP/OBS HIGH 50: CPT | Performed by: INTERNAL MEDICINE

## 2023-07-24 PROCEDURE — 700111 HCHG RX REV CODE 636 W/ 250 OVERRIDE (IP): Performed by: INTERNAL MEDICINE

## 2023-07-24 PROCEDURE — A9270 NON-COVERED ITEM OR SERVICE: HCPCS | Performed by: INTERNAL MEDICINE

## 2023-07-24 PROCEDURE — 700102 HCHG RX REV CODE 250 W/ 637 OVERRIDE(OP): Performed by: INTERNAL MEDICINE

## 2023-07-24 RX ORDER — POTASSIUM CHLORIDE 7.45 MG/ML
10 INJECTION INTRAVENOUS
Status: DISPENSED | OUTPATIENT
Start: 2023-07-24 | End: 2023-07-24

## 2023-07-24 RX ORDER — POTASSIUM CHLORIDE 7.45 MG/ML
10 INJECTION INTRAVENOUS ONCE
Status: COMPLETED | OUTPATIENT
Start: 2023-07-24 | End: 2023-07-24

## 2023-07-24 RX ORDER — MAGNESIUM SULFATE HEPTAHYDRATE 40 MG/ML
2 INJECTION, SOLUTION INTRAVENOUS ONCE
Status: COMPLETED | OUTPATIENT
Start: 2023-07-24 | End: 2023-07-24

## 2023-07-24 RX ORDER — LORAZEPAM 2 MG/ML
0.5 INJECTION INTRAMUSCULAR EVERY 4 HOURS PRN
Status: DISCONTINUED | OUTPATIENT
Start: 2023-07-24 | End: 2023-07-26 | Stop reason: HOSPADM

## 2023-07-24 RX ADMIN — THERA TABS 1 TABLET: TAB at 06:11

## 2023-07-24 RX ADMIN — DULOXETINE HYDROCHLORIDE 60 MG: 30 CAPSULE, DELAYED RELEASE ORAL at 06:10

## 2023-07-24 RX ADMIN — Medication 5 MG: at 20:11

## 2023-07-24 RX ADMIN — LEVETIRACETAM 500 MG: 500 TABLET, FILM COATED ORAL at 06:10

## 2023-07-24 RX ADMIN — POTASSIUM CHLORIDE 10 MEQ: 7.46 INJECTION, SOLUTION INTRAVENOUS at 07:42

## 2023-07-24 RX ADMIN — GABAPENTIN 300 MG: 300 CAPSULE ORAL at 06:10

## 2023-07-24 RX ADMIN — SENNOSIDES AND DOCUSATE SODIUM 2 TABLET: 50; 8.6 TABLET ORAL at 17:10

## 2023-07-24 RX ADMIN — OXYCODONE 5 MG: 5 TABLET ORAL at 23:38

## 2023-07-24 RX ADMIN — MAGNESIUM SULFATE HEPTAHYDRATE 2 G: 2 INJECTION, SOLUTION INTRAVENOUS at 13:25

## 2023-07-24 RX ADMIN — CHLORDIAZEPOXIDE HYDROCHLORIDE 25 MG: 25 CAPSULE ORAL at 06:10

## 2023-07-24 RX ADMIN — THIAMINE HCL TAB 100 MG 100 MG: 100 TAB at 06:11

## 2023-07-24 RX ADMIN — SODIUM CHLORIDE, POTASSIUM CHLORIDE, SODIUM LACTATE AND CALCIUM CHLORIDE: 600; 310; 30; 20 INJECTION, SOLUTION INTRAVENOUS at 20:35

## 2023-07-24 RX ADMIN — OXYCODONE 5 MG: 5 TABLET ORAL at 20:11

## 2023-07-24 RX ADMIN — POTASSIUM CHLORIDE 10 MEQ: 7.46 INJECTION, SOLUTION INTRAVENOUS at 11:59

## 2023-07-24 RX ADMIN — OXYCODONE 5 MG: 5 TABLET ORAL at 13:00

## 2023-07-24 RX ADMIN — FOLIC ACID 1 MG: 1 TABLET ORAL at 06:11

## 2023-07-24 RX ADMIN — CHLORDIAZEPOXIDE HYDROCHLORIDE 25 MG: 25 CAPSULE ORAL at 17:10

## 2023-07-24 RX ADMIN — RIVAROXABAN 10 MG: 10 TABLET, FILM COATED ORAL at 17:10

## 2023-07-24 RX ADMIN — LORAZEPAM 2 MG: 2 INJECTION INTRAMUSCULAR; INTRAVENOUS at 01:15

## 2023-07-24 RX ADMIN — MIRTAZAPINE 30 MG: 15 TABLET, ORALLY DISINTEGRATING ORAL at 20:10

## 2023-07-24 RX ADMIN — CHLORDIAZEPOXIDE HYDROCHLORIDE 25 MG: 25 CAPSULE ORAL at 11:57

## 2023-07-24 RX ADMIN — LEVETIRACETAM 500 MG: 500 TABLET, FILM COATED ORAL at 17:10

## 2023-07-24 RX ADMIN — SODIUM CHLORIDE, POTASSIUM CHLORIDE, SODIUM LACTATE AND CALCIUM CHLORIDE: 600; 310; 30; 20 INJECTION, SOLUTION INTRAVENOUS at 05:02

## 2023-07-24 RX ADMIN — OXYCODONE 5 MG: 5 TABLET ORAL at 02:15

## 2023-07-24 RX ADMIN — CHLORDIAZEPOXIDE HYDROCHLORIDE 25 MG: 25 CAPSULE ORAL at 23:29

## 2023-07-24 RX ADMIN — METOPROLOL SUCCINATE 25 MG: 25 TABLET, EXTENDED RELEASE ORAL at 06:11

## 2023-07-24 ASSESSMENT — LIFESTYLE VARIABLES
NAUSEA AND VOMITING: NO NAUSEA AND NO VOMITING
TREMOR: *
ORIENTATION AND CLOUDING OF SENSORIUM: ORIENTED AND CAN DO SERIAL ADDITIONS
VISUAL DISTURBANCES: NOT PRESENT
TREMOR: *
AUDITORY DISTURBANCES: NOT PRESENT
AGITATION: NORMAL ACTIVITY
PAROXYSMAL SWEATS: NO SWEAT VISIBLE
HEADACHE, FULLNESS IN HEAD: NOT PRESENT
AGITATION: SOMEWHAT MORE THAN NORMAL ACTIVITY
ORIENTATION AND CLOUDING OF SENSORIUM: ORIENTED AND CAN DO SERIAL ADDITIONS
ANXIETY: *
NAUSEA AND VOMITING: NO NAUSEA AND NO VOMITING
TOTAL SCORE: 3
VISUAL DISTURBANCES: NOT PRESENT
HEADACHE, FULLNESS IN HEAD: NOT PRESENT
TOTAL SCORE: 5
PAROXYSMAL SWEATS: NO SWEAT VISIBLE
AUDITORY DISTURBANCES: NOT PRESENT
ANXIETY: MILDLY ANXIOUS

## 2023-07-24 ASSESSMENT — PAIN DESCRIPTION - PAIN TYPE
TYPE: ACUTE PAIN

## 2023-07-24 ASSESSMENT — ENCOUNTER SYMPTOMS
CHILLS: 0
COUGH: 0
FEVER: 0
BACK PAIN: 1
DIZZINESS: 0
PALPITATIONS: 0
HEADACHES: 0
SHORTNESS OF BREATH: 0

## 2023-07-24 NOTE — CARE PLAN
The patient is Watcher - Medium risk of patient condition declining or worsening    Shift Goals  Clinical Goals: goal RASS -1 to +1, pt safety  Patient Goals: KUN  Family Goals: KUN    Progress made toward(s) clinical / shift goals:  Patient maintained at RASS -1 to +1, no PRN Ativan needed, no Dex needed. Pt remained free from falls.     Problem: Pain - Standard  Goal: Alleviation of pain or a reduction in pain to the patient’s comfort goal  Outcome: Progressing     Problem: Knowledge Deficit - Standard  Goal: Patient and family/care givers will demonstrate understanding of plan of care, disease process/condition, diagnostic tests and medications  Outcome: Progressing     Problem: Fall Risk  Goal: Patient will remain free from falls  Outcome: Progressing     Problem: Psychosocial  Goal: Patient's level of anxiety will decrease  Outcome: Progressing     Problem: Hemodynamics  Goal: Patient's hemodynamics, fluid balance and neurologic status will be stable or improve  Outcome: Progressing     Problem: Optimal Care for Alcohol Withdrawal  Goal: Optimal Care for the alcohol withdrawal patient  Outcome: Progressing     Problem: Seizure Precautions  Goal: Implementation of seizure precautions  Outcome: Progressing     Problem: Psychosocial  Goal: Patient's level of anxiety will decrease  Outcome: Progressing     Problem: Risk for Aspiration  Goal: Patient's risk for aspiration will be absent or decrease  Outcome: Progressing     Problem: Skin Integrity  Goal: Skin integrity is maintained or improved  Outcome: Progressing       Patient is not progressing towards the following goals:      Problem: Psychosocial  Goal: Patient's ability to verbalize feelings about condition will improve  Outcome: Not Progressing     Problem: Nutrition  Goal: Patient's nutritional and fluid intake will be adequate or improve  Outcome: Not Progressing     Problem: Lifestyle Changes  Goal: Patient's ability to identify lifestyle changes and  available resources to help reduce recurrence of condition will improve  Outcome: Not Progressing

## 2023-07-24 NOTE — PROGRESS NOTES
12-hour chart check complete.    Monitor Summary  Rhythm: SB/SR/ST  Rate:   Ectopy: rPVC/PAC/Trig  Measurements: 0.20/0.08/0.38

## 2023-07-24 NOTE — PROGRESS NOTES
12 hour chart check complete.    Monitor Summary:    Rhythm:  Sinus Rhythm, Sinus Tach    HR:  82-96    Ectopy:  rare PAC, PVC    0.18/ 0.08/ 0.42

## 2023-07-24 NOTE — PROGRESS NOTES
"Critical Care Progress Note    Date of admission  7/19/2023    Chief Complaint  67 y.o. female admitted 7/19/2023 with Etoh withdrawal and possible tylenol overdose    Hospital Course  \"67 y.o. female who presented 7/19/2023 with complaints of chest and sacral pain.  She had a ground-level fall last month, broke her coccyx, and has been in significant pain since then.  To control the pain she has been taking high-dose Tylenol 4 to 8 g/day as well as alcohol drinking 6-8 shots per day.  He has a history of requiring narcotics for long-term so she has been avoiding these.  She has a history of dyslipidemia, seizures on Keppra, anxiety and depression on Cymbalta and Remeron     Serum Tylenol level was undetectably low on admission.  Checked twice.  Patient control was contacted recommending NAC despite low levels.  She had mildly elevated transaminases.  Otherwise no fulminant signs of liver failure.  Serum EtOH 43.  The course of admission she developed signs and symptoms of alcohol withdrawal was started on Ativan.  Overnight her withdrawal symptoms progressively got worse CIWA 20+ and difficult to control so she was transferred to the ICU for Precedex drip.\" - Dr. Esteban note    Interval Problem Update  Reviewed last 24 hour events:  7/24/23 - Patient doing much better today, off Dex since this morning, only needing PRN ativan. Denies anxiety or nervousness.  Normal HR.     Review of Systems  Review of Systems   Constitutional:  Negative for chills, fever and malaise/fatigue.   Respiratory:  Negative for cough and shortness of breath.    Cardiovascular:  Negative for chest pain, palpitations and leg swelling.   Musculoskeletal:  Positive for back pain.   Neurological:  Negative for dizziness and headaches.        Vital Signs for last 24 hours   Temp:  [36.1 °C (97 °F)-36.7 °C (98 °F)] 36.4 °C (97.5 °F)  Pulse:  [] 69  Resp:  [12-85] 34  BP: (142-170)/() 170/94  SpO2:  [91 %-99 %] 94 %    Hemodynamic " parameters for last 24 hours       Respiratory Information for the last 24 hours       Physical Exam   Physical Exam  Constitutional:       Appearance: Normal appearance.   HENT:      Head: Atraumatic.      Mouth/Throat:      Mouth: Mucous membranes are dry.   Eyes:      Extraocular Movements: Extraocular movements intact.   Cardiovascular:      Rate and Rhythm: Normal rate and regular rhythm.      Heart sounds: Normal heart sounds.   Pulmonary:      Effort: Pulmonary effort is normal. No respiratory distress.      Breath sounds: Normal breath sounds. No wheezing or rales.   Abdominal:      Palpations: Abdomen is soft.   Musculoskeletal:         General: No swelling, tenderness or deformity.   Skin:     General: Skin is warm and dry.   Neurological:      General: No focal deficit present.      Mental Status: She is alert and oriented to person, place, and time.         Medications  Current Facility-Administered Medications   Medication Dose Route Frequency Provider Last Rate Last Admin    chlordiazePOXIDE (Librium) capsule 25 mg  25 mg Oral Q6HRS Ed Esteban M.D.   25 mg at 07/24/23 1157    dexmedetomidine (PRECEDEX) 400 mcg/100mL NS premix infusion  0.1-1 mcg/kg/hr (Ideal) Intravenous Continuous Ed Esteban M.D.   Held at 07/23/23 0830    LORazepam (Ativan) injection 1-2 mg  1-2 mg Intravenous Q2HRS PRN Ed Esteban M.D.   2 mg at 07/24/23 0115    MD Alert...ICU Electrolyte Replacement per Pharmacy   Other PHARMACY TO DOSE Ed Esteban M.D.        melatonin tablet 5 mg  5 mg Oral Nightly Ed Esteban M.D.   5 mg at 07/23/23 2035    traMADol (Ultram) 50 MG tablet 50 mg  50 mg Oral Q6HRS PRN Ed Esteban M.D.   50 mg at 07/22/23 2026    folic acid (Folvite) tablet 1 mg  1 mg Oral DAILY Leonel Rucker D.O.   1 mg at 07/24/23 0611    thiamine (Vitamin B-1) tablet 100 mg  100 mg Oral DAILY Leonel Rucker D.O.   100 mg at 07/24/23 0611    multivitamin tablet 1 Tablet  1 Tablet Oral  DAILY Leonel Rucker D.O.   1 Tablet at 07/24/23 0611    sodium chloride (Ocean) 0.65 % nasal spray 2 Spray  2 Spray Nasal Q2HRS PRN Leonel Rucker D.O.        lactated ringers infusion   Intravenous Continuous Archana Franco M.D. 83 mL/hr at 07/24/23 0502 New Bag at 07/24/23 0502    DULoxetine (Cymbalta) capsule 60 mg  60 mg Oral DAILY JAYA BettsOChiquis   60 mg at 07/24/23 0610    gabapentin (Neurontin) capsule 300 mg  300 mg Oral DAILY JAYA BettsOChiquis   300 mg at 07/24/23 0610    levETIRAcetam (Keppra) tablet 500 mg  500 mg Oral BID Leonel Rucker D.O.   500 mg at 07/24/23 0610    metoprolol SR (Toprol XL) tablet 25 mg  25 mg Oral DAILY JAYA BettsOChiquis   25 mg at 07/24/23 0611    mirtazapine (Remeron) orally disintegrating tab 30 mg  30 mg Oral Nightly JAYA BettsOChiquis   30 mg at 07/23/23 2035    senna-docusate (Pericolace Or Senokot S) 8.6-50 MG per tablet 2 Tablet  2 Tablet Oral BID Leonel Rucker D.O.   2 Tablet at 07/23/23 0538    And    polyethylene glycol/lytes (Miralax) PACKET 1 Packet  1 Packet Oral QDAY PRN Leonel Rucker D.O.        And    magnesium hydroxide (Milk Of Magnesia) suspension 30 mL  30 mL Oral QDAY PRN Leonel Rucker D.O.        And    bisacodyl (Dulcolax) suppository 10 mg  10 mg Rectal QDAY PRN Leonel Rucker D.O.        rivaroxaban (Xarelto) tablet 10 mg  10 mg Oral DAILY AT 1800 JAYA BettsO.   10 mg at 07/23/23 1747    Pharmacy Consult Request ...Pain Management Review 1 Each  1 Each Other PHARMACY TO DOSE Leonel Rucker D.O.        oxyCODONE immediate-release (Roxicodone) tablet 5 mg  5 mg Oral Q3HRS PRN JAYA BettsO.   5 mg at 07/24/23 1300    Or    oxyCODONE immediate release (Roxicodone) tablet 10 mg  10 mg Oral Q3HRS PRN JAYA BettsO.   10 mg at 07/1957    Or    morphine 4 MG/ML injection 4 mg  4 mg Intravenous Q3HRS PRN JAYA BettsO.   4 mg at 07/23/23 0057    labetalol (Normodyne/Trandate) injection 10 mg   10 mg Intravenous Q4HRS PRN Leonel Rucker D.O.        ondansetron (Zofran) syringe/vial injection 4 mg  4 mg Intravenous Q4HRS PRN Leonel Rucker D.O.        ondansetron (Zofran ODT) dispertab 4 mg  4 mg Oral Q4HRS PRN Leonel Rucker D.O.           Fluids    Intake/Output Summary (Last 24 hours) at 7/24/2023 1659  Last data filed at 7/24/2023 1630  Gross per 24 hour   Intake 1966.8 ml   Output 2475 ml   Net -508.2 ml       Laboratory          Recent Labs     07/23/23  0416 07/24/23  0300   SODIUM 142 140   POTASSIUM 3.8 3.7   CHLORIDE 107 106   CO2 28 25   BUN 5* 6*   CREATININE 0.54 0.58   MAGNESIUM 1.7 1.8   PHOSPHORUS 3.8 3.6   CALCIUM 9.3 9.5     Recent Labs     07/23/23  0416 07/24/23  0300   ALTSGPT 32 29   ASTSGOT 42 33   ALKPHOSPHAT 107* 106*   TBILIRUBIN 0.4 0.3   GLUCOSE 114* 112*     Recent Labs     07/23/23  0416 07/24/23  0300   WBC 3.5* 3.6*   NEUTSPOLYS 53.50 50.90   LYMPHOCYTES 26.10 26.20   MONOCYTES 13.90* 15.70*   EOSINOPHILS 5.10 5.20   BASOPHILS 1.10 1.40   ASTSGOT 42 33   ALTSGPT 32 29   ALKPHOSPHAT 107* 106*   TBILIRUBIN 0.4 0.3     Recent Labs     07/23/23  0416 07/24/23  0300   RBC 3.03* 2.83*   HEMOGLOBIN 9.8* 9.2*   HEMATOCRIT 30.1* 28.6*   PLATELETCT 218 218       Imaging  Pertinent imaging reviewed    Assessment/Plan  #EtOH withdrawal  Plan:  - CIWA on floor  - s/p Banana bag  - seizure precautions    #Tylenol overdose (took 8g of tylenol for back pain after coccyx fracture, cant take NSAIDS due to crohns per patient)  Plan:  - Pain controlled with tramadol  - Will need home pain regimen  - Counseled regarding over use of tylenol    #History of Sz  Plan:  - Continue keppra  - Continue neurontin    #Anxiety and depression  Plan:  - Continue home cymbalta and Remeron    VTE:  NOAC  Ulcer: Not Indicated  Lines: None    Transfer to telemetry    I have performed a physical exam and reviewed and updated ROS and Plan today (7/24/2023). In review of yesterday's note (7/23/2023), there  are no changes except as documented above.     Discussed patient condition and risk of morbidity and/or mortality with RN, RT, Pharmacy, and Patient  The patient remains critically ill.  Critical care time = 45 minutes in directly providing and coordinating critical care and extensive data review.  No time overlap and excludes procedures.    Vilma Abbasi MD RD  Pulmonary and Critical Care    Available on Voalte

## 2023-07-24 NOTE — PROGRESS NOTES
12-hour chart check complete.    Monitor Summary  Rhythm: SR  Rate: 68-99  Ectopy: Rpvc  Measurements: 0.18/0.08/0.36

## 2023-07-24 NOTE — PROGRESS NOTES
4 Eyes Skin Assessment Completed by PIERO Rich and PIERO Carter.    Head WDL  Ears WDL  Nose WDL  Mouth WDL  Neck WDL  Breast/Chest WDL  Shoulder Blades WDL  Spine WDL  (R) Arm/Elbow/Hand WDL  (L) Arm/Elbow/Hand WDL  Abdomen WDL  Groin WDL  Scrotum/Coccyx/Buttocks Redness, bruising on coccyx  (R) Leg WDL  (L) Leg WDL  (R) Heel/Foot/Toe WDL  (L) Heel/Foot/Toe WDL      Devices In Places ECG, Blood Pressure Cuff, and Pulse Ox    Interventions In Place TAP System and Low Air Loss Mattress    Possible Skin Injury No    Pictures Uploaded Into Epic N/A  Wound Consult Placed N/A  RN Wound Prevention Protocol Ordered No

## 2023-07-24 NOTE — CARE PLAN
The patient is Watcher - Medium risk of patient condition declining or worsening    Shift Goals  Clinical Goals: CIWA, Pt safety, pain management  Patient Goals: feel better  Family Goals: KUN    Progress made toward(s) clinical / shift goals:    Problem: Fall Risk  Goal: Patient will remain free from falls  Outcome: Progressing     Problem: Hemodynamics  Goal: Patient's hemodynamics, fluid balance and neurologic status will be stable or improve  Outcome: Progressing     Problem: Nutrition  Goal: Patient's nutritional and fluid intake will be adequate or improve  Outcome: Progressing     Problem: Seizure Precautions  Goal: Implementation of seizure precautions  Outcome: Progressing     Problem: Skin Integrity  Goal: Skin integrity is maintained or improved  Outcome: Progressing       Patient is not progressing towards the following goals:      Problem: Psychosocial  Goal: Patient's level of anxiety will decrease  Outcome: Not Progressing

## 2023-07-25 LAB
ALBUMIN SERPL BCP-MCNC: 3.4 G/DL (ref 3.2–4.9)
ALBUMIN/GLOB SERPL: 1.4 G/DL
ALP SERPL-CCNC: 108 U/L (ref 30–99)
ALT SERPL-CCNC: 26 U/L (ref 2–50)
ANION GAP SERPL CALC-SCNC: 11 MMOL/L (ref 7–16)
AST SERPL-CCNC: 23 U/L (ref 12–45)
BASOPHILS # BLD AUTO: 1.3 % (ref 0–1.8)
BASOPHILS # BLD: 0.05 K/UL (ref 0–0.12)
BILIRUB SERPL-MCNC: 0.2 MG/DL (ref 0.1–1.5)
BUN SERPL-MCNC: 6 MG/DL (ref 8–22)
CALCIUM ALBUM COR SERPL-MCNC: 10 MG/DL (ref 8.5–10.5)
CALCIUM SERPL-MCNC: 9.5 MG/DL (ref 8.4–10.2)
CHLORIDE SERPL-SCNC: 105 MMOL/L (ref 96–112)
CO2 SERPL-SCNC: 23 MMOL/L (ref 20–33)
CREAT SERPL-MCNC: 0.61 MG/DL (ref 0.5–1.4)
EOSINOPHIL # BLD AUTO: 0.19 K/UL (ref 0–0.51)
EOSINOPHIL NFR BLD: 4.9 % (ref 0–6.9)
ERYTHROCYTE [DISTWIDTH] IN BLOOD BY AUTOMATED COUNT: 49.6 FL (ref 35.9–50)
GFR SERPLBLD CREATININE-BSD FMLA CKD-EPI: 99 ML/MIN/1.73 M 2
GLOBULIN SER CALC-MCNC: 2.4 G/DL (ref 1.9–3.5)
GLUCOSE SERPL-MCNC: 119 MG/DL (ref 65–99)
HCT VFR BLD AUTO: 31.3 % (ref 37–47)
HGB BLD-MCNC: 10.3 G/DL (ref 12–16)
IMM GRANULOCYTES # BLD AUTO: 0.03 K/UL (ref 0–0.11)
IMM GRANULOCYTES NFR BLD AUTO: 0.8 % (ref 0–0.9)
LYMPHOCYTES # BLD AUTO: 1.16 K/UL (ref 1–4.8)
LYMPHOCYTES NFR BLD: 29.7 % (ref 22–41)
MAGNESIUM SERPL-MCNC: 2 MG/DL (ref 1.5–2.5)
MCH RBC QN AUTO: 32.7 PG (ref 27–33)
MCHC RBC AUTO-ENTMCNC: 32.9 G/DL (ref 32.2–35.5)
MCV RBC AUTO: 99.4 FL (ref 81.4–97.8)
MONOCYTES # BLD AUTO: 0.74 K/UL (ref 0–0.85)
MONOCYTES NFR BLD AUTO: 18.9 % (ref 0–13.4)
NEUTROPHILS # BLD AUTO: 1.74 K/UL (ref 1.82–7.42)
NEUTROPHILS NFR BLD: 44.4 % (ref 44–72)
NRBC # BLD AUTO: 0 K/UL
NRBC BLD-RTO: 0 /100 WBC (ref 0–0.2)
PHOSPHATE SERPL-MCNC: 3.7 MG/DL (ref 2.5–4.5)
PLATELET # BLD AUTO: 237 K/UL (ref 164–446)
PMV BLD AUTO: 11.6 FL (ref 9–12.9)
POTASSIUM SERPL-SCNC: 3.7 MMOL/L (ref 3.6–5.5)
PROT SERPL-MCNC: 5.8 G/DL (ref 6–8.2)
RBC # BLD AUTO: 3.15 M/UL (ref 4.2–5.4)
SODIUM SERPL-SCNC: 139 MMOL/L (ref 135–145)
WBC # BLD AUTO: 3.9 K/UL (ref 4.8–10.8)

## 2023-07-25 PROCEDURE — A9270 NON-COVERED ITEM OR SERVICE: HCPCS | Performed by: INTERNAL MEDICINE

## 2023-07-25 PROCEDURE — 700111 HCHG RX REV CODE 636 W/ 250 OVERRIDE (IP): Mod: JZ | Performed by: INTERNAL MEDICINE

## 2023-07-25 PROCEDURE — 84100 ASSAY OF PHOSPHORUS: CPT

## 2023-07-25 PROCEDURE — 85025 COMPLETE CBC W/AUTO DIFF WBC: CPT

## 2023-07-25 PROCEDURE — 94760 N-INVAS EAR/PLS OXIMETRY 1: CPT

## 2023-07-25 PROCEDURE — 700102 HCHG RX REV CODE 250 W/ 637 OVERRIDE(OP): Performed by: INTERNAL MEDICINE

## 2023-07-25 PROCEDURE — 770020 HCHG ROOM/CARE - TELE (206)

## 2023-07-25 PROCEDURE — 36415 COLL VENOUS BLD VENIPUNCTURE: CPT

## 2023-07-25 PROCEDURE — 700105 HCHG RX REV CODE 258: Mod: JZ | Performed by: FAMILY MEDICINE

## 2023-07-25 PROCEDURE — 83735 ASSAY OF MAGNESIUM: CPT

## 2023-07-25 PROCEDURE — 80053 COMPREHEN METABOLIC PANEL: CPT

## 2023-07-25 PROCEDURE — 99232 SBSQ HOSP IP/OBS MODERATE 35: CPT | Performed by: INTERNAL MEDICINE

## 2023-07-25 RX ADMIN — CHLORDIAZEPOXIDE HYDROCHLORIDE 25 MG: 25 CAPSULE ORAL at 05:21

## 2023-07-25 RX ADMIN — CHLORDIAZEPOXIDE HYDROCHLORIDE 25 MG: 25 CAPSULE ORAL at 11:38

## 2023-07-25 RX ADMIN — FOLIC ACID 1 MG: 1 TABLET ORAL at 05:21

## 2023-07-25 RX ADMIN — THIAMINE HCL TAB 100 MG 100 MG: 100 TAB at 06:45

## 2023-07-25 RX ADMIN — GABAPENTIN 300 MG: 300 CAPSULE ORAL at 05:25

## 2023-07-25 RX ADMIN — Medication 5 MG: at 20:59

## 2023-07-25 RX ADMIN — CHLORDIAZEPOXIDE HYDROCHLORIDE 25 MG: 25 CAPSULE ORAL at 23:22

## 2023-07-25 RX ADMIN — RIVAROXABAN 10 MG: 10 TABLET, FILM COATED ORAL at 17:50

## 2023-07-25 RX ADMIN — MORPHINE SULFATE 4 MG: 4 INJECTION INTRAVENOUS at 16:01

## 2023-07-25 RX ADMIN — METOPROLOL SUCCINATE 25 MG: 25 TABLET, EXTENDED RELEASE ORAL at 05:25

## 2023-07-25 RX ADMIN — THERA TABS 1 TABLET: TAB at 05:21

## 2023-07-25 RX ADMIN — SODIUM CHLORIDE, POTASSIUM CHLORIDE, SODIUM LACTATE AND CALCIUM CHLORIDE: 600; 310; 30; 20 INJECTION, SOLUTION INTRAVENOUS at 08:43

## 2023-07-25 RX ADMIN — SENNOSIDES AND DOCUSATE SODIUM 2 TABLET: 50; 8.6 TABLET ORAL at 06:45

## 2023-07-25 RX ADMIN — LEVETIRACETAM 500 MG: 500 TABLET, FILM COATED ORAL at 05:19

## 2023-07-25 RX ADMIN — LORAZEPAM 0.5 MG: 2 INJECTION INTRAMUSCULAR; INTRAVENOUS at 15:14

## 2023-07-25 RX ADMIN — CHLORDIAZEPOXIDE HYDROCHLORIDE 25 MG: 25 CAPSULE ORAL at 17:50

## 2023-07-25 RX ADMIN — SODIUM CHLORIDE, POTASSIUM CHLORIDE, SODIUM LACTATE AND CALCIUM CHLORIDE: 600; 310; 30; 20 INJECTION, SOLUTION INTRAVENOUS at 20:57

## 2023-07-25 RX ADMIN — OXYCODONE HYDROCHLORIDE 10 MG: 10 TABLET ORAL at 07:23

## 2023-07-25 RX ADMIN — OXYCODONE HYDROCHLORIDE 10 MG: 10 TABLET ORAL at 21:02

## 2023-07-25 RX ADMIN — MIRTAZAPINE 30 MG: 15 TABLET, ORALLY DISINTEGRATING ORAL at 20:58

## 2023-07-25 RX ADMIN — OXYCODONE HYDROCHLORIDE 10 MG: 10 TABLET ORAL at 15:14

## 2023-07-25 RX ADMIN — LEVETIRACETAM 500 MG: 500 TABLET, FILM COATED ORAL at 17:50

## 2023-07-25 RX ADMIN — SENNOSIDES AND DOCUSATE SODIUM 2 TABLET: 50; 8.6 TABLET ORAL at 17:50

## 2023-07-25 RX ADMIN — DULOXETINE HYDROCHLORIDE 60 MG: 30 CAPSULE, DELAYED RELEASE ORAL at 05:18

## 2023-07-25 ASSESSMENT — PAIN DESCRIPTION - PAIN TYPE
TYPE: ACUTE PAIN

## 2023-07-25 ASSESSMENT — COGNITIVE AND FUNCTIONAL STATUS - GENERAL
SUGGESTED CMS G CODE MODIFIER MOBILITY: CH
MOBILITY SCORE: 24
SUGGESTED CMS G CODE MODIFIER DAILY ACTIVITY: CH
DAILY ACTIVITIY SCORE: 24

## 2023-07-25 ASSESSMENT — ENCOUNTER SYMPTOMS
HEARTBURN: 0
MYALGIAS: 0
ABDOMINAL PAIN: 0
BLURRED VISION: 0
SHORTNESS OF BREATH: 0
TREMORS: 1
SENSORY CHANGE: 0
PHOTOPHOBIA: 0
VOMITING: 0
CONSTIPATION: 0
COUGH: 0
SPEECH CHANGE: 0
HEADACHES: 0
INSOMNIA: 0
FEVER: 0
DIZZINESS: 0
NERVOUS/ANXIOUS: 0
CLAUDICATION: 0
DIARRHEA: 0
DEPRESSION: 0
CHILLS: 0
WEAKNESS: 0

## 2023-07-25 ASSESSMENT — FIBROSIS 4 INDEX: FIB4 SCORE: 1.24

## 2023-07-25 NOTE — DIETARY
Nutrition Update:    Day 6 of admit.  Odalys Keenan is a 67 y.o. female with admitting DX of Tylenol overdose, accidental or unintentional, initial encounter [T39.1X1A].  Patient being followed to optimize nutrition.    Current Diet: regular, vegetarian    Recorded PO dwindled from % of most meals to <25% since 7/24. Pt reports that she did not receive a vegetarian breakfast this morning. NR visited pt today and obtained meal orders for the next 2 days. Pt said that there is a lot of food. Small portions requested. She also said that she will eat dairy and cheese. She agreed to Boost Breeze supplements TID w/ meals for additional kcals and protein. She prefers Boost Breeze to Boost Plus.     Problem: Nutritional:  Goal: Achieve adequate nutritional intake  Description: Patient will consume >50% of meals  Outcome: progressing slower than expected.    RD following.

## 2023-07-25 NOTE — PROGRESS NOTES
4 Eyes Skin Assessment Completed by PIERO Laws and PIERO Barron.    Head WDL  Ears WDL  Nose WDL  Mouth WDL  Neck WDL  Breast/Chest WDL  Shoulder Blades WDL  Spine WDL  (R) Arm/Elbow/Hand WDL  (L) Arm/Elbow/Hand WDL  Abdomen WDL  Groin WDL  Scrotum/Coccyx/Buttocks Discoloration  (R) Leg WDL  (L) Leg WDL  (R) Heel/Foot/Toe WDL  (L) Heel/Foot/Toe WDL          Devices In Places Tele Box      Interventions In Place Pillows    Possible Skin Injury No    Pictures Uploaded Into Epic N/A  Wound Consult Placed N/A  RN Wound Prevention Protocol Ordered No

## 2023-07-25 NOTE — PROGRESS NOTES
Hospital Medicine Daily Progress Note    Date of Service  7/25/2023    Chief Complaint  Odalys Keenan is a 67 y.o. female admitted 7/19/2023 with acute alcohol intoxication    Hospital Course  67-year-old female who presented on 7/19 with complaints of chest and sacral pain had a ground-level fall month prior broke her coccyx and has been in significant pain she has been taking high-dose Tylenol between 4 and 8 g a day as well as drinking alcohol 6-8 shots a day.  She has a history of narcotic dependence therefore was avoiding those medications.  Serum Tylenol level was undetectable on admission and poison control recommended N-acetylcysteine despite the low levels which she was given for 24 hours.  During the course of admission she had significant alcohol withdrawal started on Ativan but then continued to worsen CIWA greater than 20 therefore she was admitted to the ICU for Precedex drip.  She tolerated this well and is weaning off the Precedex drip and has been successfully transitioned out of the intensive care unit.  She still is slightly anxious but her pain is under good control with tramadol and her resting tremor from benign essential tremor is only slightly worse than her baseline.    Interval Problem Update  7/25 patient out of the intensive care unit and feeling better.  She remains on Librium for her alcohol withdrawal and I will watch for an additional 24 hours prior to discontinuing this medication.  If she does not show any with further worsening signs of alcohol withdrawal believe she should be able to transition home tomorrow.    I have discussed this patient's plan of care and discharge plan at IDT rounds today with Case Management, Nursing, Nursing leadership, and other members of the IDT team.    Consultants/Specialty  none    Code Status  Full Code    Disposition  The patient is not medically cleared for discharge to home or a post-acute facility.  Anticipate discharge to: home with  close outpatient follow-up    I have placed the appropriate orders for post-discharge needs.    Review of Systems  Review of Systems   Constitutional:  Negative for chills and fever.   HENT:  Negative for congestion.    Eyes:  Negative for blurred vision and photophobia.   Respiratory:  Negative for cough and shortness of breath.    Cardiovascular:  Negative for chest pain, claudication and leg swelling.   Gastrointestinal:  Negative for abdominal pain, constipation, diarrhea, heartburn and vomiting.   Genitourinary:  Negative for dysuria and hematuria.   Musculoskeletal:  Negative for joint pain and myalgias.   Skin:  Negative for itching and rash.   Neurological:  Positive for tremors (Patient states she has resting essential tremor, slightly worse than baseline). Negative for dizziness, sensory change, speech change, weakness and headaches.   Psychiatric/Behavioral:  Negative for depression. The patient is not nervous/anxious and does not have insomnia.         Physical Exam  Temp:  [36.2 °C (97.1 °F)-36.5 °C (97.7 °F)] 36.5 °C (97.7 °F)  Pulse:  [65-99] 94  Resp:  [7-34] 20  BP: (122-173)/(68-98) 139/80  SpO2:  [70 %-100 %] 98 %    Physical Exam  Vitals and nursing note reviewed.   Constitutional:       General: She is not in acute distress.     Appearance: Normal appearance. She is not ill-appearing.   HENT:      Head: Normocephalic and atraumatic.      Nose: Nose normal.   Cardiovascular:      Rate and Rhythm: Normal rate and regular rhythm.      Heart sounds: Normal heart sounds. No murmur heard.  Pulmonary:      Effort: Pulmonary effort is normal.      Breath sounds: Normal breath sounds.   Abdominal:      General: Bowel sounds are normal. There is no distension.      Palpations: Abdomen is soft.   Musculoskeletal:         General: No swelling or tenderness.      Cervical back: Neck supple.   Skin:     General: Skin is warm and dry.   Neurological:      General: No focal deficit present.      Mental Status:  She is alert and oriented to person, place, and time.      Comments: Bilateral hand resting tremor   Psychiatric:         Mood and Affect: Mood normal.         Fluids    Intake/Output Summary (Last 24 hours) at 7/25/2023 1327  Last data filed at 7/24/2023 1935  Gross per 24 hour   Intake 461.35 ml   Output 1075 ml   Net -613.65 ml       Laboratory  Recent Labs     07/23/23  0416 07/24/23  0300 07/25/23  0214   WBC 3.5* 3.6* 3.9*   RBC 3.03* 2.83* 3.15*   HEMOGLOBIN 9.8* 9.2* 10.3*   HEMATOCRIT 30.1* 28.6* 31.3*   MCV 99.3* 101.1* 99.4*   MCH 32.3 32.5 32.7   MCHC 32.6 32.2 32.9   RDW 49.1 49.4 49.6   PLATELETCT 218 218 237   MPV 11.2 11.0 11.6     Recent Labs     07/23/23 0416 07/24/23  0300 07/25/23  0214   SODIUM 142 140 139   POTASSIUM 3.8 3.7 3.7   CHLORIDE 107 106 105   CO2 28 25 23   GLUCOSE 114* 112* 119*   BUN 5* 6* 6*   CREATININE 0.54 0.58 0.61   CALCIUM 9.3 9.5 9.5                   Imaging  IR-US GUIDED PIV   Final Result    Ultrasound-guided PERIPHERAL IV INSERTION performed by    qualified nursing staff as above.      US-RUQ   Final Result      1.  Hepatic steatosis   2.  Minimally distended gallbladder but no signs of acute inflammation and no cholelithiasis. This is of doubtful clinical significance.      CT-CTA CHEST PULMONARY ARTERY W/ RECONS   Final Result      1.  There is no CT evidence of acute pulmonary embolism.   2.  No pneumothorax, pneumonia or pleural effusion.   3.  Small peripheral micronodules likely postinflammatory. No follow-up imaging is indicated per Fleischner guideline.   4.  Hepatic steatosis.            DX-CHEST-PORTABLE (1 VIEW)   Final Result      1.  There is no acute cardiopulmonary process.           Assessment/Plan  * Acute alcohol intoxication with alcoholism with delirium (HCC)- (present on admission)  Assessment & Plan  Patient initially admitted to the intensive care unit secondary to need of Precedex for her alcohol withdrawal  She has been successfully weaned from  Precedex and now remains on Librium  Withdrawal symptoms drastically improved.    Dyslipidemia- (present on admission)  Assessment & Plan  Hold statin    Seizures (HCC)- (present on admission)  Assessment & Plan  Continue home Keppra    Anxiety and depression- (present on admission)  Assessment & Plan  Continue home Cymbalta and Remeron   start as needed Ativan    Elevated liver enzymes- (present on admission)  Assessment & Plan  Transaminases normal  Status post N-acetylcysteine empirically per poison control recommendations    Tylenol overdose, accidental or unintentional, initial encounter- (present on admission)  Assessment & Plan  - Pt was taking 4gm of Tylenol daily, took 8gm daily for four days in June per patient   N-acetylcysteine was given per poison control recommendations   AST ALT are normal         VTE prophylaxis: SCDs/TEDs    I have performed a physical exam and reviewed and updated ROS and Plan today (7/25/2023). In review of yesterday's note (7/24/2023), there are no changes except as documented above.

## 2023-07-25 NOTE — PROGRESS NOTES
Telemetry Shift Summary     Rhythm: SR/ST  HR:   Ectopy: none    Measurements: 0.16/0.08/0.34    Normal Values  Rhythm: SR  HR:   Measurements: 0.12-0.20/0.08-0.10/0.30-0.52

## 2023-07-25 NOTE — CARE PLAN
Problem: Psychosocial  Goal: Patient's level of anxiety will decrease  Outcome: Progressing     Problem: Lifestyle Changes  Goal: Patient's ability to identify lifestyle changes and available resources to help reduce recurrence of condition will improve  Outcome: Progressing     Problem: Psychosocial  Goal: Patient's level of anxiety will decrease  Outcome: Progressing     Problem: Risk for Aspiration  Goal: Patient's risk for aspiration will be absent or decrease  Outcome: Progressing     Problem: Skin Integrity  Goal: Skin integrity is maintained or improved  Outcome: Progressing   The patient is Watcher - Medium risk of patient condition declining or worsening    Shift Goals  Clinical Goals: no s/s of withdrawl  Patient Goals: sleep comfortably  Family Goals: KUN    Progress made toward(s) clinical / shift goals:  Patient has not had tremors this shift. She acknowledged she needs to take much less Tylenol for pain, stated she misunderstood the dosing after breaking her tailbone. Skin remains intact. Turns herself side to side. Was not anxious this shift. No difficulty swallowing pills or liquids    Patient is not progressing towards the following goals:

## 2023-07-25 NOTE — HOSPITAL COURSE
67-year-old female who presented on 7/19 with complaints of chest and sacral pain had a ground-level fall month prior broke her coccyx and has been in significant pain she has been taking high-dose Tylenol between 4 and 8 g a day as well as drinking alcohol 6-8 shots a day.  She has a history of narcotic dependence therefore was avoiding those medications.  Serum Tylenol level was undetectable on admission and poison control recommended N-acetylcysteine despite the low levels which she was given for 24 hours.  During the course of admission she had significant alcohol withdrawal started on Ativan but then continued to worsen CIWA greater than 20 therefore she was admitted to the ICU for Precedex drip.  She tolerated this well and is weaning off the Precedex drip and has been successfully transitioned out of the intensive care unit.  She still is slightly anxious but her pain is under good control with tramadol and her resting tremor from benign essential tremor is only slightly worse than her baseline.

## 2023-07-25 NOTE — CARE PLAN
The patient is Stable - Low risk of patient condition declining or worsening    Shift Goals  Clinical Goals: Monitor for s/s of ETOH withdrawal, monitor tele, seizure precautions.  Patient Goals: Pain management  Family Goals: KUN    Progress made toward(s) clinical / shift goals:    Problem: Pain - Standard  Goal: Alleviation of pain or a reduction in pain to the patient’s comfort goal  Outcome: Progressing     Problem: Fall Risk  Goal: Patient will remain free from falls  Outcome: Progressing     Problem: Optimal Care for Alcohol Withdrawal  Goal: Optimal Care for the alcohol withdrawal patient  Outcome: Progressing  Note: Per [facility] protocol not necessary. Continue with librium and low dose ativan as needed.       Patient is not progressing towards the following goals:

## 2023-07-26 ENCOUNTER — PATIENT OUTREACH (OUTPATIENT)
Dept: SCHEDULING | Facility: IMAGING CENTER | Age: 68
End: 2023-07-26
Payer: OTHER MISCELLANEOUS

## 2023-07-26 VITALS
OXYGEN SATURATION: 92 % | WEIGHT: 156.09 LBS | TEMPERATURE: 97.5 F | HEIGHT: 68 IN | BODY MASS INDEX: 23.66 KG/M2 | SYSTOLIC BLOOD PRESSURE: 136 MMHG | DIASTOLIC BLOOD PRESSURE: 78 MMHG | HEART RATE: 89 BPM | RESPIRATION RATE: 16 BRPM

## 2023-07-26 LAB
ANION GAP SERPL CALC-SCNC: 11 MMOL/L (ref 7–16)
BUN SERPL-MCNC: 6 MG/DL (ref 8–22)
CALCIUM SERPL-MCNC: 9.6 MG/DL (ref 8.4–10.2)
CHLORIDE SERPL-SCNC: 107 MMOL/L (ref 96–112)
CO2 SERPL-SCNC: 23 MMOL/L (ref 20–33)
CREAT SERPL-MCNC: 0.78 MG/DL (ref 0.5–1.4)
ERYTHROCYTE [DISTWIDTH] IN BLOOD BY AUTOMATED COUNT: 53.1 FL (ref 35.9–50)
GFR SERPLBLD CREATININE-BSD FMLA CKD-EPI: 84 ML/MIN/1.73 M 2
GLUCOSE SERPL-MCNC: 99 MG/DL (ref 65–99)
HCT VFR BLD AUTO: 34 % (ref 37–47)
HGB BLD-MCNC: 10.9 G/DL (ref 12–16)
MCH RBC QN AUTO: 32.8 PG (ref 27–33)
MCHC RBC AUTO-ENTMCNC: 32.1 G/DL (ref 32.2–35.5)
MCV RBC AUTO: 102.4 FL (ref 81.4–97.8)
PLATELET # BLD AUTO: 286 K/UL (ref 164–446)
PMV BLD AUTO: 11.3 FL (ref 9–12.9)
POTASSIUM SERPL-SCNC: 3.6 MMOL/L (ref 3.6–5.5)
RBC # BLD AUTO: 3.32 M/UL (ref 4.2–5.4)
SODIUM SERPL-SCNC: 141 MMOL/L (ref 135–145)
WBC # BLD AUTO: 4.6 K/UL (ref 4.8–10.8)

## 2023-07-26 PROCEDURE — 36415 COLL VENOUS BLD VENIPUNCTURE: CPT

## 2023-07-26 PROCEDURE — 80048 BASIC METABOLIC PNL TOTAL CA: CPT

## 2023-07-26 PROCEDURE — A9270 NON-COVERED ITEM OR SERVICE: HCPCS | Performed by: INTERNAL MEDICINE

## 2023-07-26 PROCEDURE — 85027 COMPLETE CBC AUTOMATED: CPT

## 2023-07-26 PROCEDURE — 94760 N-INVAS EAR/PLS OXIMETRY 1: CPT

## 2023-07-26 PROCEDURE — 99239 HOSP IP/OBS DSCHRG MGMT >30: CPT | Performed by: INTERNAL MEDICINE

## 2023-07-26 PROCEDURE — 700102 HCHG RX REV CODE 250 W/ 637 OVERRIDE(OP): Performed by: INTERNAL MEDICINE

## 2023-07-26 RX ORDER — TRAMADOL HYDROCHLORIDE 50 MG/1
50 TABLET ORAL EVERY 6 HOURS PRN
Qty: 40 TABLET | Refills: 0 | Status: SHIPPED | OUTPATIENT
Start: 2023-07-26 | End: 2023-08-05

## 2023-07-26 RX ORDER — FOLIC ACID 1 MG/1
1 TABLET ORAL DAILY
Qty: 30 TABLET | Refills: 1 | Status: ON HOLD | OUTPATIENT
Start: 2023-07-27 | End: 2023-09-06

## 2023-07-26 RX ADMIN — GABAPENTIN 300 MG: 300 CAPSULE ORAL at 04:29

## 2023-07-26 RX ADMIN — THIAMINE HCL TAB 100 MG 100 MG: 100 TAB at 04:27

## 2023-07-26 RX ADMIN — METOPROLOL SUCCINATE 25 MG: 25 TABLET, EXTENDED RELEASE ORAL at 04:28

## 2023-07-26 RX ADMIN — FOLIC ACID 1 MG: 1 TABLET ORAL at 04:27

## 2023-07-26 RX ADMIN — OXYCODONE HYDROCHLORIDE 10 MG: 10 TABLET ORAL at 04:20

## 2023-07-26 RX ADMIN — THERA TABS 1 TABLET: TAB at 04:29

## 2023-07-26 RX ADMIN — DULOXETINE HYDROCHLORIDE 60 MG: 30 CAPSULE, DELAYED RELEASE ORAL at 04:29

## 2023-07-26 RX ADMIN — LEVETIRACETAM 500 MG: 500 TABLET, FILM COATED ORAL at 04:29

## 2023-07-26 ASSESSMENT — PAIN DESCRIPTION - PAIN TYPE
TYPE: ACUTE PAIN
TYPE: ACUTE PAIN

## 2023-07-26 ASSESSMENT — FIBROSIS 4 INDEX: FIB4 SCORE: 1.03

## 2023-07-26 NOTE — PROGRESS NOTES
Telemetry Shift Summary     Rhythm: SR  HR: 81-97  Ectopy: r PVC    Measurements: 0.16/0.08/0.38    Normal Values  Rhythm: SR  HR:   Measurements: 0.12-0.20/0.08-0.10/0.30-0.52

## 2023-07-26 NOTE — CARE PLAN
Problem: Pain - Standard  Goal: Alleviation of pain or a reduction in pain to the patient’s comfort goal  Outcome: Progressing   Pain is being managed effectively with oxycodone    Problem: Fall Risk  Goal: Patient will remain free from falls  Outcome: Progressing   Patient is using call light when she needs to go to the bathroom. Verbalizes understanding of safety precautions  Problem: Hemodynamics  Goal: Patient's hemodynamics, fluid balance and neurologic status will be stable or improve  Outcome: Progressing  VS are now WNL. Patient AxOx4.      The patient is Stable - Low risk of patient condition declining or worsening    Shift Goals  Clinical Goals: monitor for withdrawl  Patient Goals: sleep  Family Goals: KUN    Progress made toward(s) clinical / shift goals:      Patient is not progressing towards the following goals:

## 2023-07-26 NOTE — DISCHARGE SUMMARY
Discharge Summary    CHIEF COMPLAINT ON ADMISSION  Chief Complaint   Patient presents with    Chest Pain     With shortness of breath for several days per pt    Arm Pain     Left arm, neck, and body pain         Reason for Admission  Chest Pain, Shortness of breath     Admission Date  7/19/2023    CODE STATUS  Full Code    HPI & HOSPITAL COURSE  67-year-old female who presented on 7/19 with complaints of chest and sacral pain had a ground-level fall month prior broke her coccyx and has been in significant pain she has been taking high-dose Tylenol between 4 and 8 g a day as well as drinking alcohol 6-8 shots a day.  She has a history of narcotic dependence therefore was avoiding those medications.  Serum Tylenol level was undetectable on admission and poison control recommended N-acetylcysteine despite the low levels which she was given for 24 hours.  During the course of admission she had significant alcohol withdrawal started on Ativan but then continued to worsen CIWA greater than 20 therefore she was admitted to the ICU for Precedex drip.  She tolerated this well and weaned off the Precedex drip and has been successfully transitioned out of the intensive care unit.  Her pain is controlled with tramadol and a prescription for this has been sent to her pharmacy.  She is no longer showing signs of withdrawal but does have tremor with her benign essential tremor.  At this point she is appropriate to discharge home and she states she feels safe to do so.    Therefore, she is discharged in good and stable condition to home with close outpatient follow-up.    The patient met 2-midnight criteria for an inpatient stay at the time of discharge.    Discharge Date  7/26/2023    FOLLOW UP ITEMS POST DISCHARGE  PCP-2 weeks    DISCHARGE DIAGNOSES  Principal Problem:    Acute alcohol intoxication with alcoholism with delirium (HCC) (POA: Yes)  Active Problems:    Tylenol overdose, accidental or unintentional, initial encounter  (POA: Yes)    Elevated liver enzymes (POA: Yes)    Anxiety and depression (POA: Yes)    Seizures (HCC) (POA: Yes)    Dyslipidemia (POA: Yes)  Resolved Problems:    * No resolved hospital problems. *      FOLLOW UP  No future appointments.  [facility]  [address] [id number]  [phone]  Follow up  Please call [facility] to establish with a Primary Care Provider.      MEDICATIONS ON DISCHARGE     Medication List        START taking these medications        Instructions   folic acid 1 MG Tabs  Start taking on: July 27, 2023  Commonly known as: Folvite   Take 1 Tablet by mouth every day.  Dose: 1 mg     traMADol 50 MG Tabs  Commonly known as: Ultram   Take 1 Tablet by mouth every 6 hours as needed for Severe Pain for up to 10 days.  Dose: 50 mg            CONTINUE taking these medications        Instructions   DULoxetine 60 MG Cpep delayed-release capsule  Commonly known as: Cymbalta   Take 60 mg by mouth every day. Takes in the AM  Dose: 60 mg     gabapentin 300 MG Caps  Commonly known as: Neurontin   Take 300 mg by mouth every day. Takes in the AM  Dose: 300 mg     levETIRAcetam 500 MG Tabs  Commonly known as: Keppra   Take 500 mg by mouth 2 times a day.  Dose: 500 mg     metoprolol SR 25 MG Tb24  Commonly known as: Toprol XL   Take 25 mg by mouth every day. Takes in the AM  Dose: 25 mg     mirtazapine 30 MG Tabs tablet  Commonly known as: Remeron   Take 30 mg by mouth every evening.  Dose: 30 mg     rosuvastatin 20 MG Tabs  Commonly known as: Crestor   Take 20 mg by mouth every day. Takes in the AM  Dose: 20 mg              Allergies  No Known Allergies    DIET  Orders Placed This Encounter   Procedures    Diet Order Diet: Regular; Miscellaneous modifications: (optional): Vegetarian     Standing Status:   Standing     Number of Occurrences:   1     Order Specific Question:   Diet:     Answer:   Regular [1]     Order Specific Question:   Miscellaneous modifications:  (optional)     Answer:   Vegetarian [13]       ACTIVITY  As tolerated.  Weight bearing as tolerated    CONSULTATIONS    Critical care-Dr. Esteban  PROCEDURES  None    LABORATORY  Lab Results   Component Value Date    SODIUM 141 07/26/2023    POTASSIUM 3.6 07/26/2023    CHLORIDE 107 07/26/2023    CO2 23 07/26/2023    GLUCOSE 99 07/26/2023    BUN 6 (L) 07/26/2023    CREATININE 0.78 07/26/2023        Lab Results   Component Value Date    WBC 4.6 (L) 07/26/2023    HEMOGLOBIN 10.9 (L) 07/26/2023    HEMATOCRIT 34.0 (L) 07/26/2023    PLATELETCT 286 07/26/2023        Total time of the discharge process exceeds 35 minutes.

## 2023-07-26 NOTE — PROGRESS NOTES
Telemetry Shift Summary    Rhythm SR/ST  HR Range   Ectopy R-PVC (reported by MT)  Measurements 0.18/0.10/0.36        Normal Values  Rhythm SR  HR Range    Measurements 0.12-0.20 / 0.06-0.10  / 0.30-0.52

## 2023-07-27 ENCOUNTER — TELEPHONE (OUTPATIENT)
Dept: HEALTH INFORMATION MANAGEMENT | Facility: OTHER | Age: 68
End: 2023-07-27

## 2023-08-17 PROBLEM — F10.931 ALCOHOL WITHDRAWAL DELIRIUM (HCC): Status: ACTIVE | Noted: 2023-08-17

## 2023-08-17 PROBLEM — K51.00 PANCOLITIS (HCC): Status: ACTIVE | Noted: 2023-08-17

## 2023-08-18 PROBLEM — I48.91 ATRIAL FIBRILLATION WITH RVR (HCC): Status: ACTIVE | Noted: 2023-08-18

## 2023-08-24 ENCOUNTER — HOSPITAL ENCOUNTER (OUTPATIENT)
Dept: RADIOLOGY | Facility: MEDICAL CENTER | Age: 68
End: 2023-08-24
Payer: MEDICARE

## 2023-08-25 ENCOUNTER — HOSPITAL ENCOUNTER (INPATIENT)
Facility: MEDICAL CENTER | Age: 68
LOS: 4 days | DRG: 444 | End: 2023-08-29
Attending: STUDENT IN AN ORGANIZED HEALTH CARE EDUCATION/TRAINING PROGRAM | Admitting: INTERNAL MEDICINE
Payer: MEDICARE

## 2023-08-25 DIAGNOSIS — E78.5 DYSLIPIDEMIA: ICD-10-CM

## 2023-08-25 DIAGNOSIS — I48.0 PAROXYSMAL ATRIAL FIBRILLATION (HCC): ICD-10-CM

## 2023-08-25 DIAGNOSIS — K81.9 CHOLECYSTITIS: ICD-10-CM

## 2023-08-25 DIAGNOSIS — F10.931 ALCOHOL WITHDRAWAL DELIRIUM (HCC): ICD-10-CM

## 2023-08-25 DIAGNOSIS — R13.10 DYSPHAGIA, UNSPECIFIED TYPE: ICD-10-CM

## 2023-08-25 DIAGNOSIS — A04.72 C. DIFFICILE COLITIS: ICD-10-CM

## 2023-08-25 PROBLEM — G93.40 ACUTE ENCEPHALOPATHY: Status: ACTIVE | Noted: 2023-08-25

## 2023-08-25 PROBLEM — J96.01 ACUTE RESPIRATORY FAILURE WITH HYPOXIA (HCC): Status: ACTIVE | Noted: 2023-08-25

## 2023-08-25 PROBLEM — E87.3 METABOLIC ALKALOSIS: Status: RESOLVED | Noted: 2023-08-25 | Resolved: 2023-08-25

## 2023-08-25 PROBLEM — J90 PLEURAL EFFUSION: Status: ACTIVE | Noted: 2023-08-25

## 2023-08-25 PROBLEM — E87.3 METABOLIC ALKALOSIS: Status: ACTIVE | Noted: 2023-08-25

## 2023-08-25 LAB
ALBUMIN SERPL BCP-MCNC: 2.4 G/DL (ref 3.2–4.9)
ALBUMIN/GLOB SERPL: 0.9 G/DL
ALP SERPL-CCNC: 976 U/L (ref 30–99)
ALT SERPL-CCNC: 39 U/L (ref 2–50)
AMMONIA PLAS-SCNC: 23 UMOL/L (ref 11–45)
ANION GAP SERPL CALC-SCNC: 12 MMOL/L (ref 7–16)
ANISOCYTOSIS BLD QL SMEAR: ABNORMAL
APTT PPP: 23.9 SEC (ref 24.7–36)
ARTERIAL PATENCY WRIST A: ABNORMAL
AST SERPL-CCNC: 48 U/L (ref 12–45)
BASE EXCESS BLDA CALC-SCNC: -2 MMOL/L (ref -4–3)
BASOPHILS # BLD AUTO: 0 % (ref 0–1.8)
BASOPHILS # BLD: 0 K/UL (ref 0–0.12)
BILIRUB SERPL-MCNC: 0.5 MG/DL (ref 0.1–1.5)
BODY TEMPERATURE: ABNORMAL DEGREES
BUN SERPL-MCNC: 17 MG/DL (ref 8–22)
CALCIUM ALBUM COR SERPL-MCNC: 9.7 MG/DL (ref 8.5–10.5)
CALCIUM SERPL-MCNC: 8.4 MG/DL (ref 8.5–10.5)
CHLORIDE SERPL-SCNC: 105 MMOL/L (ref 96–112)
CO2 BLDA-SCNC: 19 MMOL/L (ref 20–33)
CO2 SERPL-SCNC: 16 MMOL/L (ref 20–33)
CREAT SERPL-MCNC: 0.51 MG/DL (ref 0.5–1.4)
CRP SERPL HS-MCNC: 0.85 MG/DL (ref 0–0.75)
DELSYS IDSYS: ABNORMAL
EOSINOPHIL # BLD AUTO: 0 K/UL (ref 0–0.51)
EOSINOPHIL NFR BLD: 0 % (ref 0–6.9)
ERYTHROCYTE [DISTWIDTH] IN BLOOD BY AUTOMATED COUNT: 48.1 FL (ref 35.9–50)
GFR SERPLBLD CREATININE-BSD FMLA CKD-EPI: 103 ML/MIN/1.73 M 2
GLOBULIN SER CALC-MCNC: 2.7 G/DL (ref 1.9–3.5)
GLUCOSE BLD STRIP.AUTO-MCNC: 197 MG/DL (ref 65–99)
GLUCOSE SERPL-MCNC: 202 MG/DL (ref 65–99)
HCO3 BLDA-SCNC: 18.4 MMOL/L (ref 17–25)
HCT VFR BLD AUTO: 35.1 % (ref 37–47)
HGB BLD-MCNC: 12.2 G/DL (ref 12–16)
HOROWITZ INDEX BLDA+IHG-RTO: 225 MM[HG]
INR PPP: 0.89 (ref 0.87–1.13)
LIPASE SERPL-CCNC: 432 U/L (ref 11–82)
LPM ILPM: 1 LPM
LYMPHOCYTES # BLD AUTO: 1.63 K/UL (ref 1–4.8)
LYMPHOCYTES NFR BLD: 6.1 % (ref 22–41)
MACROCYTES BLD QL SMEAR: ABNORMAL
MAGNESIUM SERPL-MCNC: 1.6 MG/DL (ref 1.5–2.5)
MANUAL DIFF BLD: NORMAL
MCH RBC QN AUTO: 31.9 PG (ref 27–33)
MCHC RBC AUTO-ENTMCNC: 34.8 G/DL (ref 32.2–35.5)
MCV RBC AUTO: 91.9 FL (ref 81.4–97.8)
MONOCYTES # BLD AUTO: 1.17 K/UL (ref 0–0.85)
MONOCYTES NFR BLD AUTO: 4.4 % (ref 0–13.4)
MORPHOLOGY BLD-IMP: NORMAL
NEUTROPHILS # BLD AUTO: 23.9 K/UL (ref 1.82–7.42)
NEUTROPHILS NFR BLD: 89.5 % (ref 44–72)
NRBC # BLD AUTO: 0.12 K/UL
NRBC BLD-RTO: 0.4 /100 WBC (ref 0–0.2)
O2/TOTAL GAS SETTING VFR VENT: 24 %
PCO2 BLDA: 21.7 MMHG (ref 26–37)
PCO2 TEMP ADJ BLDA: 20.6 MMHG (ref 26–37)
PH BLDA: 7.54 [PH] (ref 7.4–7.5)
PH TEMP ADJ BLDA: 7.55 [PH] (ref 7.4–7.5)
PHOSPHATE SERPL-MCNC: 0.7 MG/DL (ref 2.5–4.5)
PLATELET # BLD AUTO: 412 K/UL (ref 164–446)
PLATELET BLD QL SMEAR: NORMAL
PMV BLD AUTO: 11.9 FL (ref 9–12.9)
PO2 BLDA: 54 MMHG (ref 64–87)
PO2 TEMP ADJ BLDA: 50 MMHG (ref 64–87)
POLYCHROMASIA BLD QL SMEAR: NORMAL
POTASSIUM SERPL-SCNC: 3.6 MMOL/L (ref 3.6–5.5)
PROCALCITONIN SERPL-MCNC: 1.8 NG/ML
PROT SERPL-MCNC: 5.1 G/DL (ref 6–8.2)
PROTHROMBIN TIME: 12.1 SEC (ref 12–14.6)
RBC # BLD AUTO: 3.82 M/UL (ref 4.2–5.4)
RBC BLD AUTO: PRESENT
SAO2 % BLDA: 92 % (ref 93–99)
SODIUM SERPL-SCNC: 133 MMOL/L (ref 135–145)
SPECIMEN DRAWN FROM PATIENT: ABNORMAL
WBC # BLD AUTO: 26.7 K/UL (ref 4.8–10.8)

## 2023-08-25 PROCEDURE — 83735 ASSAY OF MAGNESIUM: CPT

## 2023-08-25 PROCEDURE — 84443 ASSAY THYROID STIM HORMONE: CPT

## 2023-08-25 PROCEDURE — 700101 HCHG RX REV CODE 250: Performed by: HOSPITALIST

## 2023-08-25 PROCEDURE — 82010 KETONE BODYS QUAN: CPT

## 2023-08-25 PROCEDURE — 83690 ASSAY OF LIPASE: CPT

## 2023-08-25 PROCEDURE — 85730 THROMBOPLASTIN TIME PARTIAL: CPT

## 2023-08-25 PROCEDURE — 85025 COMPLETE CBC W/AUTO DIFF WBC: CPT

## 2023-08-25 PROCEDURE — 80053 COMPREHEN METABOLIC PANEL: CPT | Mod: 91

## 2023-08-25 PROCEDURE — 82962 GLUCOSE BLOOD TEST: CPT

## 2023-08-25 PROCEDURE — 700105 HCHG RX REV CODE 258: Performed by: HOSPITALIST

## 2023-08-25 PROCEDURE — 85652 RBC SED RATE AUTOMATED: CPT

## 2023-08-25 PROCEDURE — 84145 PROCALCITONIN (PCT): CPT

## 2023-08-25 PROCEDURE — 82140 ASSAY OF AMMONIA: CPT

## 2023-08-25 PROCEDURE — 85610 PROTHROMBIN TIME: CPT

## 2023-08-25 PROCEDURE — 84439 ASSAY OF FREE THYROXINE: CPT

## 2023-08-25 PROCEDURE — 99223 1ST HOSP IP/OBS HIGH 75: CPT | Mod: AI | Performed by: HOSPITALIST

## 2023-08-25 PROCEDURE — 770000 HCHG ROOM/CARE - INTERMEDIATE ICU *

## 2023-08-25 PROCEDURE — 86140 C-REACTIVE PROTEIN: CPT

## 2023-08-25 PROCEDURE — 36600 WITHDRAWAL OF ARTERIAL BLOOD: CPT

## 2023-08-25 PROCEDURE — 94640 AIRWAY INHALATION TREATMENT: CPT

## 2023-08-25 PROCEDURE — 700111 HCHG RX REV CODE 636 W/ 250 OVERRIDE (IP): Mod: JZ | Performed by: HOSPITALIST

## 2023-08-25 PROCEDURE — 84100 ASSAY OF PHOSPHORUS: CPT

## 2023-08-25 PROCEDURE — 82803 BLOOD GASES ANY COMBINATION: CPT | Mod: 91

## 2023-08-25 PROCEDURE — 85007 BL SMEAR W/DIFF WBC COUNT: CPT

## 2023-08-25 RX ORDER — METRONIDAZOLE 500 MG/100ML
500 INJECTION, SOLUTION INTRAVENOUS EVERY 8 HOURS
Status: DISCONTINUED | OUTPATIENT
Start: 2023-08-25 | End: 2023-08-29

## 2023-08-25 RX ORDER — LEVETIRACETAM 500 MG/5ML
500 INJECTION, SOLUTION, CONCENTRATE INTRAVENOUS EVERY 12 HOURS
Status: DISCONTINUED | OUTPATIENT
Start: 2023-08-25 | End: 2023-08-29 | Stop reason: HOSPADM

## 2023-08-25 RX ORDER — ONDANSETRON 2 MG/ML
4 INJECTION INTRAMUSCULAR; INTRAVENOUS EVERY 4 HOURS PRN
Status: DISCONTINUED | OUTPATIENT
Start: 2023-08-25 | End: 2023-08-29 | Stop reason: HOSPADM

## 2023-08-25 RX ORDER — MAGNESIUM SULFATE HEPTAHYDRATE 40 MG/ML
2 INJECTION, SOLUTION INTRAVENOUS ONCE
Status: COMPLETED | OUTPATIENT
Start: 2023-08-25 | End: 2023-08-26

## 2023-08-25 RX ORDER — ONDANSETRON 4 MG/1
4 TABLET, ORALLY DISINTEGRATING ORAL EVERY 4 HOURS PRN
Status: DISCONTINUED | OUTPATIENT
Start: 2023-08-25 | End: 2023-08-29 | Stop reason: HOSPADM

## 2023-08-25 RX ORDER — ACETAMINOPHEN 325 MG/1
650 TABLET ORAL EVERY 6 HOURS PRN
Status: DISCONTINUED | OUTPATIENT
Start: 2023-08-25 | End: 2023-08-25

## 2023-08-25 RX ORDER — METRONIDAZOLE 500 MG/100ML
500 INJECTION, SOLUTION INTRAVENOUS EVERY 8 HOURS
Status: DISCONTINUED | OUTPATIENT
Start: 2023-08-25 | End: 2023-08-25

## 2023-08-25 RX ORDER — LEVALBUTEROL INHALATION SOLUTION 1.25 MG/3ML
1.25 SOLUTION RESPIRATORY (INHALATION)
Status: DISCONTINUED | OUTPATIENT
Start: 2023-08-25 | End: 2023-08-29 | Stop reason: HOSPADM

## 2023-08-25 RX ORDER — METHYLPREDNISOLONE SODIUM SUCCINATE 40 MG/ML
40 INJECTION, POWDER, LYOPHILIZED, FOR SOLUTION INTRAMUSCULAR; INTRAVENOUS ONCE
Status: COMPLETED | OUTPATIENT
Start: 2023-08-25 | End: 2023-08-25

## 2023-08-25 RX ORDER — ACETAMINOPHEN 325 MG/1
650 TABLET ORAL EVERY 6 HOURS PRN
Status: DISCONTINUED | OUTPATIENT
Start: 2023-08-25 | End: 2023-08-29 | Stop reason: HOSPADM

## 2023-08-25 RX ORDER — LEVETIRACETAM 500 MG/1
500 TABLET ORAL 2 TIMES DAILY
Status: DISCONTINUED | OUTPATIENT
Start: 2023-08-25 | End: 2023-08-25

## 2023-08-25 RX ORDER — ONDANSETRON 4 MG/1
4 TABLET, ORALLY DISINTEGRATING ORAL EVERY 4 HOURS PRN
Status: DISCONTINUED | OUTPATIENT
Start: 2023-08-25 | End: 2023-08-25

## 2023-08-25 RX ADMIN — LEVALBUTEROL 1.25 MG: 1.25 SOLUTION RESPIRATORY (INHALATION) at 20:56

## 2023-08-25 RX ADMIN — METHYLPREDNISOLONE SODIUM SUCCINATE 40 MG: 40 INJECTION, POWDER, FOR SOLUTION INTRAMUSCULAR; INTRAVENOUS at 23:06

## 2023-08-25 RX ADMIN — POTASSIUM PHOSPHATE, MONOBASIC AND POTASSIUM PHOSPHATE, DIBASIC 30 MMOL: 224; 236 INJECTION, SOLUTION, CONCENTRATE INTRAVENOUS at 23:12

## 2023-08-25 RX ADMIN — MAGNESIUM SULFATE HEPTAHYDRATE 2 G: 2 INJECTION, SOLUTION INTRAVENOUS at 22:59

## 2023-08-25 RX ADMIN — LEVETIRACETAM 500 MG: 100 INJECTION, SOLUTION, CONCENTRATE INTRAVENOUS at 23:06

## 2023-08-25 RX ADMIN — METRONIDAZOLE 500 MG: 5 INJECTION, SOLUTION INTRAVENOUS at 23:01

## 2023-08-25 ASSESSMENT — COGNITIVE AND FUNCTIONAL STATUS - GENERAL
MOBILITY SCORE: 6
SUGGESTED CMS G CODE MODIFIER MOBILITY: CN
STANDING UP FROM CHAIR USING ARMS: TOTAL
DRESSING REGULAR UPPER BODY CLOTHING: TOTAL
DAILY ACTIVITIY SCORE: 6
EATING MEALS: TOTAL
MOVING FROM LYING ON BACK TO SITTING ON SIDE OF FLAT BED: UNABLE
MOVING TO AND FROM BED TO CHAIR: UNABLE
HELP NEEDED FOR BATHING: TOTAL
PERSONAL GROOMING: TOTAL
CLIMB 3 TO 5 STEPS WITH RAILING: TOTAL
SUGGESTED CMS G CODE MODIFIER DAILY ACTIVITY: CN
WALKING IN HOSPITAL ROOM: TOTAL
DRESSING REGULAR LOWER BODY CLOTHING: TOTAL
TOILETING: TOTAL
TURNING FROM BACK TO SIDE WHILE IN FLAT BAD: UNABLE

## 2023-08-25 ASSESSMENT — FIBROSIS 4 INDEX: FIB4 SCORE: 2.16

## 2023-08-25 ASSESSMENT — PAIN DESCRIPTION - PAIN TYPE: TYPE: ACUTE PAIN

## 2023-08-26 ENCOUNTER — APPOINTMENT (OUTPATIENT)
Dept: RADIOLOGY | Facility: MEDICAL CENTER | Age: 68
DRG: 444 | End: 2023-08-26
Attending: HOSPITALIST
Payer: MEDICARE

## 2023-08-26 PROBLEM — R73.9 HYPERGLYCEMIA: Status: ACTIVE | Noted: 2023-08-26

## 2023-08-26 PROBLEM — D72.829 LEUKOCYTOSIS: Status: ACTIVE | Noted: 2023-08-26

## 2023-08-26 PROBLEM — E11.9 TYPE 2 DIABETES MELLITUS WITHOUT COMPLICATION, WITHOUT LONG-TERM CURRENT USE OF INSULIN (HCC): Status: ACTIVE | Noted: 2023-08-26

## 2023-08-26 LAB
ALBUMIN SERPL BCP-MCNC: 2.6 G/DL (ref 3.2–4.9)
ALBUMIN/GLOB SERPL: 1 G/DL
ALP SERPL-CCNC: 918 U/L (ref 30–99)
ALT SERPL-CCNC: 35 U/L (ref 2–50)
ANION GAP SERPL CALC-SCNC: 11 MMOL/L (ref 7–16)
ANISOCYTOSIS BLD QL SMEAR: ABNORMAL
APPEARANCE UR: CLEAR
AST SERPL-CCNC: 35 U/L (ref 12–45)
B-OH-BUTYR SERPL-MCNC: 0.35 MMOL/L (ref 0.02–0.27)
BACTERIA #/AREA URNS HPF: NEGATIVE /HPF
BASOPHILS # BLD AUTO: 0.9 % (ref 0–1.8)
BASOPHILS # BLD: 0.26 K/UL (ref 0–0.12)
BILIRUB SERPL-MCNC: 0.4 MG/DL (ref 0.1–1.5)
BILIRUB UR QL STRIP.AUTO: ABNORMAL
BLOOD CULTURE HOLD CXBCH: NORMAL
BLOOD CULTURE HOLD CXBCH: NORMAL
BUN SERPL-MCNC: 16 MG/DL (ref 8–22)
BURR CELLS BLD QL SMEAR: NORMAL
C DIFF DNA SPEC QL NAA+PROBE: NEGATIVE
C DIFF TOX GENS STL QL NAA+PROBE: NEGATIVE
CALCIUM ALBUM COR SERPL-MCNC: 8.8 MG/DL (ref 8.5–10.5)
CALCIUM SERPL-MCNC: 7.7 MG/DL (ref 8.5–10.5)
CHLORIDE SERPL-SCNC: 104 MMOL/L (ref 96–112)
CO2 SERPL-SCNC: 19 MMOL/L (ref 20–33)
COLOR UR: ABNORMAL
CREAT SERPL-MCNC: 0.52 MG/DL (ref 0.5–1.4)
CRP SERPL HS-MCNC: 0.87 MG/DL (ref 0–0.75)
EOSINOPHIL # BLD AUTO: 0 K/UL (ref 0–0.51)
EOSINOPHIL NFR BLD: 0 % (ref 0–6.9)
EPI CELLS #/AREA URNS HPF: ABNORMAL /HPF
ERYTHROCYTE [DISTWIDTH] IN BLOOD BY AUTOMATED COUNT: 48.2 FL (ref 35.9–50)
ERYTHROCYTE [SEDIMENTATION RATE] IN BLOOD BY WESTERGREN METHOD: 31 MM/HOUR (ref 0–25)
EST. AVERAGE GLUCOSE BLD GHB EST-MCNC: 103 MG/DL
GFR SERPLBLD CREATININE-BSD FMLA CKD-EPI: 102 ML/MIN/1.73 M 2
GLOBULIN SER CALC-MCNC: 2.5 G/DL (ref 1.9–3.5)
GLUCOSE BLD STRIP.AUTO-MCNC: 133 MG/DL (ref 65–99)
GLUCOSE BLD STRIP.AUTO-MCNC: 137 MG/DL (ref 65–99)
GLUCOSE BLD STRIP.AUTO-MCNC: 253 MG/DL (ref 65–99)
GLUCOSE BLD STRIP.AUTO-MCNC: 303 MG/DL (ref 65–99)
GLUCOSE SERPL-MCNC: 271 MG/DL (ref 65–99)
GLUCOSE UR STRIP.AUTO-MCNC: 500 MG/DL
HBA1C MFR BLD: 5.2 % (ref 4–5.6)
HCT VFR BLD AUTO: 33.7 % (ref 37–47)
HGB BLD-MCNC: 11.6 G/DL (ref 12–16)
HYALINE CASTS #/AREA URNS LPF: ABNORMAL /LPF
KETONES UR STRIP.AUTO-MCNC: ABNORMAL MG/DL
LEUKOCYTE ESTERASE UR QL STRIP.AUTO: ABNORMAL
LYMPHOCYTES # BLD AUTO: 0.49 K/UL (ref 1–4.8)
LYMPHOCYTES NFR BLD: 1.7 % (ref 22–41)
MACROCYTES BLD QL SMEAR: ABNORMAL
MAGNESIUM SERPL-MCNC: 2.2 MG/DL (ref 1.5–2.5)
MANUAL DIFF BLD: NORMAL
MCH RBC QN AUTO: 31.6 PG (ref 27–33)
MCHC RBC AUTO-ENTMCNC: 34.4 G/DL (ref 32.2–35.5)
MCV RBC AUTO: 91.8 FL (ref 81.4–97.8)
MICRO URNS: ABNORMAL
MONOCYTES # BLD AUTO: 0 K/UL (ref 0–0.85)
MONOCYTES NFR BLD AUTO: 0 % (ref 0–13.4)
MORPHOLOGY BLD-IMP: NORMAL
MYELOCYTES NFR BLD MANUAL: 0.9 %
NEUTROPHILS # BLD AUTO: 27.6 K/UL (ref 1.82–7.42)
NEUTROPHILS NFR BLD: 96.5 % (ref 44–72)
NITRITE UR QL STRIP.AUTO: POSITIVE
NRBC # BLD AUTO: 0.04 K/UL
NRBC BLD-RTO: 0.1 /100 WBC (ref 0–0.2)
PH UR STRIP.AUTO: 5 [PH] (ref 5–8)
PLATELET # BLD AUTO: 460 K/UL (ref 164–446)
PLATELET BLD QL SMEAR: NORMAL
PMV BLD AUTO: 11.8 FL (ref 9–12.9)
POIKILOCYTOSIS BLD QL SMEAR: NORMAL
POTASSIUM SERPL-SCNC: 4.7 MMOL/L (ref 3.6–5.5)
PREALB SERPL-MCNC: 29 MG/DL (ref 18–38)
PROT SERPL-MCNC: 5.1 G/DL (ref 6–8.2)
PROT UR QL STRIP: 100 MG/DL
RBC # BLD AUTO: 3.67 M/UL (ref 4.2–5.4)
RBC # URNS HPF: ABNORMAL /HPF
RBC BLD AUTO: PRESENT
RBC UR QL AUTO: ABNORMAL
SODIUM SERPL-SCNC: 134 MMOL/L (ref 135–145)
SP GR UR STRIP.AUTO: 1.04
T4 FREE SERPL-MCNC: 1.07 NG/DL (ref 0.93–1.7)
TSH SERPL DL<=0.005 MIU/L-ACNC: 5.98 UIU/ML (ref 0.38–5.33)
UROBILINOGEN UR STRIP.AUTO-MCNC: 0.2 MG/DL
WBC # BLD AUTO: 28.6 K/UL (ref 4.8–10.8)
WBC #/AREA URNS HPF: ABNORMAL /HPF

## 2023-08-26 PROCEDURE — 700111 HCHG RX REV CODE 636 W/ 250 OVERRIDE (IP): Performed by: HOSPITALIST

## 2023-08-26 PROCEDURE — 87493 C DIFF AMPLIFIED PROBE: CPT

## 2023-08-26 PROCEDURE — A9270 NON-COVERED ITEM OR SERVICE: HCPCS | Performed by: HOSPITALIST

## 2023-08-26 PROCEDURE — 76604 US EXAM CHEST: CPT

## 2023-08-26 PROCEDURE — 83036 HEMOGLOBIN GLYCOSYLATED A1C: CPT

## 2023-08-26 PROCEDURE — 700102 HCHG RX REV CODE 250 W/ 637 OVERRIDE(OP): Performed by: HOSPITALIST

## 2023-08-26 PROCEDURE — 82962 GLUCOSE BLOOD TEST: CPT | Mod: 91

## 2023-08-26 PROCEDURE — 700101 HCHG RX REV CODE 250: Performed by: HOSPITALIST

## 2023-08-26 PROCEDURE — 84134 ASSAY OF PREALBUMIN: CPT

## 2023-08-26 PROCEDURE — 770000 HCHG ROOM/CARE - INTERMEDIATE ICU *

## 2023-08-26 PROCEDURE — 85025 COMPLETE CBC W/AUTO DIFF WBC: CPT

## 2023-08-26 PROCEDURE — 700111 HCHG RX REV CODE 636 W/ 250 OVERRIDE (IP): Mod: JZ,JG | Performed by: HOSPITALIST

## 2023-08-26 PROCEDURE — 700105 HCHG RX REV CODE 258: Performed by: HOSPITALIST

## 2023-08-26 PROCEDURE — 83735 ASSAY OF MAGNESIUM: CPT

## 2023-08-26 PROCEDURE — 85007 BL SMEAR W/DIFF WBC COUNT: CPT

## 2023-08-26 PROCEDURE — 99233 SBSQ HOSP IP/OBS HIGH 50: CPT | Performed by: HOSPITALIST

## 2023-08-26 PROCEDURE — 80053 COMPREHEN METABOLIC PANEL: CPT

## 2023-08-26 PROCEDURE — 81001 URINALYSIS AUTO W/SCOPE: CPT

## 2023-08-26 PROCEDURE — 86140 C-REACTIVE PROTEIN: CPT

## 2023-08-26 PROCEDURE — 71045 X-RAY EXAM CHEST 1 VIEW: CPT

## 2023-08-26 RX ORDER — DEXTROSE MONOHYDRATE 25 G/50ML
25 INJECTION, SOLUTION INTRAVENOUS
Status: DISCONTINUED | OUTPATIENT
Start: 2023-08-26 | End: 2023-08-29 | Stop reason: HOSPADM

## 2023-08-26 RX ORDER — FUROSEMIDE 10 MG/ML
40 INJECTION INTRAMUSCULAR; INTRAVENOUS ONCE
Status: COMPLETED | OUTPATIENT
Start: 2023-08-26 | End: 2023-08-26

## 2023-08-26 RX ORDER — TRAMADOL HYDROCHLORIDE 50 MG/1
50 TABLET ORAL EVERY 6 HOURS PRN
Status: ON HOLD | COMMUNITY
End: 2023-08-29

## 2023-08-26 RX ORDER — SODIUM CHLORIDE 9 MG/ML
INJECTION, SOLUTION INTRAVENOUS CONTINUOUS
Status: DISCONTINUED | OUTPATIENT
Start: 2023-08-26 | End: 2023-08-28

## 2023-08-26 RX ADMIN — METRONIDAZOLE 500 MG: 5 INJECTION, SOLUTION INTRAVENOUS at 13:48

## 2023-08-26 RX ADMIN — VANCOMYCIN HYDROCHLORIDE 500 MG: 5 INJECTION, POWDER, LYOPHILIZED, FOR SOLUTION INTRAVENOUS at 17:33

## 2023-08-26 RX ADMIN — SODIUM CHLORIDE: 9 INJECTION, SOLUTION INTRAVENOUS at 16:13

## 2023-08-26 RX ADMIN — INSULIN HUMAN 5 UNITS: 100 INJECTION, SOLUTION PARENTERAL at 11:27

## 2023-08-26 RX ADMIN — THIAMINE HYDROCHLORIDE 100 MG: 100 INJECTION, SOLUTION INTRAMUSCULAR; INTRAVENOUS at 05:41

## 2023-08-26 RX ADMIN — VANCOMYCIN HYDROCHLORIDE 500 MG: 5 INJECTION, POWDER, LYOPHILIZED, FOR SOLUTION INTRAVENOUS at 11:31

## 2023-08-26 RX ADMIN — METRONIDAZOLE 500 MG: 5 INJECTION, SOLUTION INTRAVENOUS at 21:19

## 2023-08-26 RX ADMIN — FUROSEMIDE 40 MG: 10 INJECTION INTRAMUSCULAR; INTRAVENOUS at 05:36

## 2023-08-26 RX ADMIN — LEVETIRACETAM 500 MG: 100 INJECTION, SOLUTION, CONCENTRATE INTRAVENOUS at 17:32

## 2023-08-26 RX ADMIN — VANCOMYCIN HYDROCHLORIDE 500 MG: 5 INJECTION, POWDER, LYOPHILIZED, FOR SOLUTION INTRAVENOUS at 05:41

## 2023-08-26 RX ADMIN — PIPERACILLIN AND TAZOBACTAM 3.38 G: 3; .375 INJECTION, POWDER, FOR SOLUTION INTRAVENOUS at 07:09

## 2023-08-26 RX ADMIN — PIPERACILLIN AND TAZOBACTAM 3.38 G: 3; .375 INJECTION, POWDER, FOR SOLUTION INTRAVENOUS at 21:17

## 2023-08-26 RX ADMIN — PIPERACILLIN AND TAZOBACTAM 3.38 G: 3; .375 INJECTION, POWDER, FOR SOLUTION INTRAVENOUS at 02:51

## 2023-08-26 RX ADMIN — METOPROLOL TARTRATE 12.5 MG: 25 TABLET, FILM COATED ORAL at 05:37

## 2023-08-26 RX ADMIN — METRONIDAZOLE 500 MG: 5 INJECTION, SOLUTION INTRAVENOUS at 06:25

## 2023-08-26 RX ADMIN — LEVETIRACETAM 500 MG: 100 INJECTION, SOLUTION, CONCENTRATE INTRAVENOUS at 05:36

## 2023-08-26 RX ADMIN — POTASSIUM PHOSPHATE, MONOBASIC AND POTASSIUM PHOSPHATE, DIBASIC 15 MMOL: 224; 236 INJECTION, SOLUTION, CONCENTRATE INTRAVENOUS at 05:37

## 2023-08-26 RX ADMIN — ALTEPLASE 2 MG: 2.2 INJECTION, POWDER, LYOPHILIZED, FOR SOLUTION INTRAVENOUS at 15:19

## 2023-08-26 RX ADMIN — PIPERACILLIN AND TAZOBACTAM 3.38 G: 3; .375 INJECTION, POWDER, FOR SOLUTION INTRAVENOUS at 13:50

## 2023-08-26 RX ADMIN — METOPROLOL TARTRATE 12.5 MG: 25 TABLET, FILM COATED ORAL at 17:32

## 2023-08-26 RX ADMIN — INSULIN HUMAN 6 UNITS: 100 INJECTION, SOLUTION PARENTERAL at 08:29

## 2023-08-26 ASSESSMENT — PAIN DESCRIPTION - PAIN TYPE
TYPE: ACUTE PAIN

## 2023-08-26 ASSESSMENT — FIBROSIS 4 INDEX: FIB4 SCORE: 0.84

## 2023-08-26 NOTE — PROGRESS NOTES
Hospital Medicine Daily Progress Note    Date of Service  8/26/2023    Chief Complaint  Altered mentation and abdominal pain    Hospital Course  Odalys Keenan is a 67 y.o. female with a history of alcoholic liver disease, Crohn's disease (previously on immune modulators), and seizures.  She was transferred from [facility] in [place] for suicidal ideations, left-sided abdominal pain along with nausea vomiting and alcohol withdrawal.  At [facility] she was in atrial fibrillation with RVR but converted to normal sinus rhythm with pushes of Cardizem.  At [facility] she was admitted 8/25/2023.  Patient was noted to be C. difficile positive on 8/17 and imaging with a CT showed pancolitis.  She was initiated on IV Flagyl and oral vancomycin.  There was a concern of chronic cholecystitis and a cholecystostomy tube was recommended.    Interval Problem Update  8/26/2023: Patient is nonresponsive but in no distress.  Patient is moving all extremities.  Care discussed with IMCU RN.  WBC 28.6, Hgb 11.6, alkaline phosphatase: 918..  Cholecystostomy tube pending    I have discussed this patient's plan of care and discharge plan at IDT rounds today with Case Management, Nursing, Nursing leadership, and other members of the IDT team.      Code Status  Full Code    Disposition  The patient is not medically cleared for discharge to home or a post-acute facility.      I have placed the appropriate orders for post-discharge needs.    Review of Systems  Review of Systems   Unable to perform ROS: Mental acuity        Physical Exam  Temp:  [36.5 °C (97.7 °F)-36.6 °C (97.8 °F)] 36.6 °C (97.8 °F)  Pulse:  [108-127] 121  Resp:  [35-53] 44  BP: (128-154)/() 148/87  SpO2:  [90 %-98 %] 92 %        Fluids    Intake/Output Summary (Last 24 hours) at 8/26/2023 1105  Last data filed at 8/26/2023 0840  Gross per 24 hour   Intake 479.09 ml   Output 2200 ml   Net -1720.91 ml       Laboratory  Recent Labs     08/25/23  0588  08/25/23 2005 08/26/23 0339   WBC 24.9* 26.7* 28.6*   RBC 3.80* 3.82* 3.67*   HEMOGLOBIN 12.1* 12.2 11.6*   HEMATOCRIT 35.1* 35.1* 33.7*   MCV 92.4 91.9 91.8   MCH 31.8 31.9 31.6   MCHC 34.5 34.8 34.4   RDW 14.2 48.1 48.2   PLATELETCT 343 412 460*   MPV 12.2* 11.9 11.8     Recent Labs     08/25/23  0520 08/25/23 2005 08/26/23 0339   SODIUM 135* 133* 134*   POTASSIUM 3.7 3.6 4.7   CHLORIDE 107 105 104   CO2 18* 16* 19*   GLUCOSE 215* 202* 271*   BUN 18* 17 16   CREATININE 0.5* 0.51 0.52   CALCIUM 7.6* 8.4* 7.7*     Recent Labs     08/25/23  2315   APTT 23.9*   INR 0.89               Imaging  DX-CHEST-LIMITED (1 VIEW)   Final Result         1.  Pulmonary edema and/or infiltrates, similar compared to prior study.   2.  Trace bilateral pleural effusions      US-THORACENTESIS PUNCTURE RIGHT    (Results Pending)   CT-DRAIN-CHOLECTYSTOSTOMY    (Results Pending)        Assessment/Plan  * Pancolitis (HCC)- (present on admission)  Assessment & Plan  C. difficile positive at outside facility as of 8/17  Serial abdominal exams and x-rays  IV Flagyl and p.o. Vanco  Check ESR and CRP to rule out Crohn's flare  If ongoing may need to have GI consult    Leukocytosis  Assessment & Plan  Question of secondary to cholecystitis along with her C. Difficile   infection   continue with current antibiotics  Monitor CBC and vitals along with strict I's and O's    Hyperglycemia  Assessment & Plan  Glucose levels in the 200s over the last few days.  8/26/2023 glycohemoglobin: 5.2    Acute encephalopathy  Assessment & Plan  Unsure why she is so encephalopathic and unresponsive  Possible she got Ativan and has poor metabolism  Normal ammonia     C. difficile colitis  Assessment & Plan  Monitor for toxic megacolon  P.o. Vanco and Flagyl  Still he has watery bowel movements  Which volume status.      Pleural effusion  Assessment & Plan  Blood pressures stable  We will go ahead and initiate low-dose Lasix IV for anasarca  Monitor blood  pressures  Strict I's and O's and labs being monitored    Respiratory alkalosis  Assessment & Plan  8/26 serum bicarb 19 question of secondary to volume loss from diarrhea  Monitor labs watch respiratory status RT per protocol    Acute respiratory failure with hypoxia (HCC)  Assessment & Plan  On 3 L of O2 above baseline  I have ordered ABG    Cholecystitis- (present on admission)  Assessment & Plan  Chronic cholecystitis found on HIDA scan at [facility]  IV Zosyn  I have ordered cholecystostomy tube  MRCP was not completed at the other facility which GI recommended  Cholecystostomy tube pending    Atrial fibrillation with RVR (HCC)- (present on admission)  Assessment & Plan  In normal sinus rhythm  Continue metoprolol  Not anticoagulated  If no recent TSH I will recheck  Keep electrolytes within normal limits  Patient had a very low magnesium level which has been repleted as of 8/20 6 AM    Alcohol withdrawal delirium (HCC)- (present on admission)  Assessment & Plan  Patient will be admitted to the telemetry unit with close cardiac monitoring   Started on rally bag, multivitamin, thiamine and folate  Replete electrolytes  Patient has been counseled on alcohol cessation and will be provided with information for outpatient detox facilities  I will hold off on CIWA protocol since she has been in the hospital for multiple days and is completely sedated  Encouraged cessation    Seizures (HCC)- (present on admission)  Assessment & Plan  Continue Keppra 500 mg IV every 12 hours once able to take orals convert to oral  Question of this is secondary to her history of alcohol will need further delineation from her history         VTE prophylaxis:   SCDs/TEDs      I have performed a physical exam and reviewed and updated ROS and Plan today (8/26/2023). In review of yesterday's note (8/25/2023), there are no changes except as documented above.

## 2023-08-26 NOTE — ASSESSMENT & PLAN NOTE
Multifactorial  Avoid Benzodiazepines and Anticholinergics  Frequent orientation  Open window blinds during the day  Avoid naps during the day  Family at bedside whenever possible  Ensure adequate sleep at night - use Melatonin preferably  Avoid early morning labs  Avoid vital signs during sleep  Ambulate if possible  Provide adequate pain control  Monitor for urinary retention  Prevent and manage constipation  Discontinue IVs, Catheters, Tubes, Lines, Cardiac monitors, SCDs if possible

## 2023-08-26 NOTE — ASSESSMENT & PLAN NOTE
C. difficile positive at outside facility as of 8/17  Serial abdominal exams and x-rays  IV Flagyl and p.o. Vanco  Elevated ESR 31, CRP:0.8  IV fluids

## 2023-08-26 NOTE — PROGRESS NOTES
Med rec complete per [facility] on [facility] in [place] ([phone]). Patient also fills at [facility] on [address] [place] [address] and [facility] on [facility] in [place], [place].  Patient unable to participate in interview. Patient's family member, Meryl ([phone]), was unsure of patient's allergies or medications, or anyone else who might know this information. Unable to verify times of last doses or recent over-the-counter medication usage, and unable to assess patient's allergies.    Per [facility], no outpatient antibiotics dispensed within the last 30 days.  Per [facility], duloxetine (60 mg, 1 capsule every morning) was last dispensed on 4/19/2023 for a 90 day supply, and levetiracetam (500 mg, 1 tablet 2 times per day) was last dispensed to patient 10/2022 for a 90 day supply. As last doses of these medications should have been over 30 days ago if taken as prescribed, these medications have been removed from the med rec at this time.  [facility] was unable to locate any prescription for sertraline on file for patient.

## 2023-08-26 NOTE — H&P
Hospital Medicine History & Physical Note    Date of Service  8/25/2023    Primary Care Physician  Pcp Pt States None    Consultants      Specialist Names:     Code Status  Full Code    Chief Complaint  Altered mental status, abdominal pain    History of Presenting Illness  Odalys Keenan is a 67 y.o. female who presented 8/25/2023 with past medical history of alcoholic liver disease, Crohn's, seizures who originally presented to [facility] for suicidal ideation, alcohol withdrawal, nausea, vomiting, left-sided abdominal pain and encephalopathy.  Patient was noted to be C. difficile positive on 8/17.  She was having bloody streaks of stools.  CT scan of the abdomen found pancolitis.  She was started on IV Flagyl and p.o. Vanco.  GI was consulted and recommended against steroids or Biologics since she has a severe C. difficile infection.  HIDA scan was found to be positive for chronic cholecystitis and a cholecystostomy tube was recommended.  In addition, she was was noted to be in A-fib with RVR and then converted to normal sinus rhythm with pushes of Cardizem.  On my examination patient was encephalopathic and unable to answer my questions.    Patient was previously on immunomodulators for Crohn's disease but currently is not taking it.    I discussed the plan of care with patient.    Review of Systems  Review of Systems   Unable to perform ROS: Acuity of condition       Past Medical History   has a past medical history of Crohn disease (HCC), HLD (hyperlipidemia), and Hypertension.    Surgical History  Surgical history is reviewed and nonpertinent    Family History  family history is not on file.   Family history reviewed with patient. There is no family history that is pertinent to the chief complaint.     Social History   reports that she has been smoking cigarettes. She has never used smokeless tobacco. She reports current alcohol use. She reports that she does not currently use  drugs.    Allergies  Allergies   Allergen Reactions    Nsaids Vomiting       Medications  Prior to Admission Medications   Prescriptions Last Dose Informant Patient Reported? Taking?   Cholecalciferol 02124 UNIT Cap   Yes No   Sig: Take  by mouth every day.   DULoxetine (CYMBALTA) 60 MG Cap DR Particles delayed-release capsule   Yes No   Sig: Take 60 mg by mouth every day. Takes in the AM   folic acid (FOLVITE) 1 MG Tab   No No   Sig: Take 1 Tablet by mouth every day.   gabapentin (NEURONTIN) 100 MG Cap   No No   Sig: Take 1 Capsule by mouth 2 times a day.   levETIRAcetam (KEPPRA) 500 MG Tab   Yes No   Sig: Take 500 mg by mouth 2 times a day.   metoprolol SR (TOPROL XL) 25 MG TABLET SR 24 HR   Yes No   Sig: Take 25 mg by mouth every day. Takes in the AM   mirtazapine (REMERON) 30 MG Tab tablet   Yes No   Sig: Take 30 mg by mouth every evening.   naltrexone (DEPADE) 50 MG Tab  Patient's Home Pharmacy Yes No   Sig: Take 50 mg by mouth every day.   omeprazole (PRILOSEC) 40 MG delayed-release capsule  Patient's Home Pharmacy Yes No   Sig: Take 40 mg by mouth every day.   ondansetron (ZOFRAN) 4 MG Tab tablet  Patient's Home Pharmacy Yes No   Sig: Take 4 mg by mouth 2 times a day as needed for Nausea/Vomiting.   rosuvastatin (CRESTOR) 20 MG Tab   Yes No   Sig: Take 20 mg by mouth every day. Takes in the AM   sertraline (ZOLOFT) 50 MG Tab   Yes No   Sig: Take 50 mg by mouth every day.      Facility-Administered Medications: None       Physical Exam  Temp:  [36.5 °C (97.7 °F)] 36.5 °C (97.7 °F)  Pulse:  [105-129] 122  Resp:  [34-53] 45  BP: (128-157)/() 144/88  SpO2:  [90 %-95 %] 95 %      Temperature: 36.5 °C (97.7 °F)   Pulse: (!) 122   Respiration: (!) 45           Physical Exam  Vitals and nursing note reviewed.   Constitutional:       General: She is in acute distress.      Appearance: Normal appearance. She is ill-appearing and toxic-appearing. She is not diaphoretic.   HENT:      Head: Normocephalic and  atraumatic.      Nose: No congestion or rhinorrhea.      Mouth/Throat:      Pharynx: No oropharyngeal exudate or posterior oropharyngeal erythema.   Eyes:      General: No scleral icterus.  Neck:      Vascular: JVD present. No carotid bruit.   Cardiovascular:      Rate and Rhythm: Regular rhythm. Tachycardia present.      Pulses: Normal pulses.      Heart sounds: Normal heart sounds. No murmur heard.     No friction rub. No gallop.   Pulmonary:      Effort: Pulmonary effort is normal. No respiratory distress.      Breath sounds: No stridor. Rales present. No wheezing or rhonchi.   Abdominal:      General: Abdomen is flat. There is no distension.      Palpations: There is no mass.      Tenderness: There is no abdominal tenderness. There is no left CVA tenderness, guarding or rebound.      Hernia: No hernia is present.   Musculoskeletal:         General: No swelling. Normal range of motion.      Cervical back: No rigidity. No muscular tenderness.      Right lower leg: Edema present.      Left lower leg: Edema present.   Lymphadenopathy:      Cervical: No cervical adenopathy.   Skin:     General: Skin is warm.      Capillary Refill: Capillary refill takes more than 3 seconds.      Coloration: Skin is not jaundiced or pale.      Findings: No bruising or erythema.         Laboratory:  Recent Labs     08/23/23 0445 08/24/23  0531 08/25/23  0520   WBC 22.3* 20.9* 24.9*   RBC 4.00* 3.67* 3.80*   HEMOGLOBIN 12.5* 11.7* 12.1*   HEMATOCRIT 36.5* 34.0* 35.1*   MCV 91.3 92.6 92.4   MCH 31.3 31.9 31.8   MCHC 34.2 34.4 34.5   RDW 14.1 14.6* 14.2   PLATELETCT 184 250 343   MPV 12.5* 12.5* 12.2*     Recent Labs     08/23/23 0445 08/24/23  0531 08/25/23  0520   SODIUM 138 138 135*   POTASSIUM 3.3* 3.6 3.7   CHLORIDE 107 109* 107   CO2 22 19* 18*   GLUCOSE 218* 129* 215*   BUN 20* 21* 18*   CREATININE 0.6 0.6 0.5*   CALCIUM 7.7* 7.6* 7.6*     Recent Labs     08/23/23 0445 08/24/23  0531 08/25/23  0520   ALTSGPT 74* 49 48   ASTOT  "122* 82* 79*   ALKPHOSPHAT 1239* 1424* 1201*   TBILIRUBIN 1.0 0.9 0.8   GLUCOSE 218* 129* 215*         No results for input(s): \"NTPROBNP\" in the last 72 hours.      No results for input(s): \"TROPONINT\" in the last 72 hours.    Imaging:  DX-CHEST-LIMITED (1 VIEW)    (Results Pending)   IR-DRAIN-CHOLECTYSTOSTOMY    (Results Pending)       X-Ray:  I have personally reviewed the images and compared with prior images.  EKG:  I have personally reviewed the images and compared with prior images.    Assessment/Plan:  Justification for Admission Status  I anticipate this patient will require at least two midnights for appropriate medical management, necessitating inpatient admission because encephalopathy chronic cholecystitis    Patient will need a Intermediate Care (Adult and Pediatrics) bed on MEDICAL service .  The need is secondary to encephalopathy chronic cholecystitis.    * Pancolitis (HCC)- (present on admission)  Assessment & Plan  C. difficile  Serial abdominal exams and x-rays  IV Flagyl and p.o. Vanco  Check ESR and CRP to rule out Crohn's flare    Cholecystitis- (present on admission)  Assessment & Plan  Chronic cholecystitis found on HIDA scan  IV Zosyn  I have ordered cholecystostomy tube  MRCP was not completed at the other facility which GI recommended    Acute encephalopathy  Assessment & Plan  Unsure why she is so encephalopathic and unresponsive  Possible she got Ativan and has poor metabolism  Check ammonia    C. difficile colitis  Assessment & Plan  Monitor for toxic megacolon  P.o. Vanco and Flagyl  Still he has watery bowel movements      Pleural effusion  Assessment & Plan  Patient would likely need some diuresis but she is alkalotic so I will hold off for now    Respiratory alkalosis  Assessment & Plan  Unsure why she is still tachypneic  I reordered another ABG    Acute respiratory failure with hypoxia (HCC)  Assessment & Plan  On 3 L of O2 above baseline  I have ordered ABG    Atrial " fibrillation with RVR (HCC)- (present on admission)  Assessment & Plan  In normal sinus rhythm  Continue metoprolol  Not anticoagulated    Alcohol withdrawal delirium (HCC)- (present on admission)  Assessment & Plan  Patient will be admitted to the telemetry unit with close cardiac monitoring   Started on rally bag, multivitamin, thiamine and folate  Replete electrolytes  Patient has been counseled on alcohol cessation and will be provided with information for outpatient detox facilities      I will hold off on CIWA protocol since she has been in the hospital for multiple days and is completely sedated    Seizures (HCC)- (present on admission)  Assessment & Plan  Continue Keppra        VTE prophylaxis: SCDs/TEDs

## 2023-08-26 NOTE — ASSESSMENT & PLAN NOTE
In normal sinus rhythm  Continue metoprolol  Not anticoagulated  8/25 TSH:5.98  FT4:1.07  Keep electrolytes within normal limits  Patient had a very low magnesium level which has been repleted as of 8/20 6 AM

## 2023-08-26 NOTE — CARE PLAN
The patient is Watcher - Medium risk of patient condition declining or worsening    Shift Goals  Clinical Goals: improve hemodynamic status, cholecystostomy drain, trend labs, pt safety  Patient Goals: KUN  Family Goals: KUN    Progress made toward(s) clinical / shift goals: pt is safe    Patient is not progressing towards the following goals: pt has low GCS and unable to communicate, poor respiratory status with unimproved tachypnea, continued diarrhea. Unable to get ken drain d/t lack of consent availability      Problem: Respiratory  Goal: Patient will achieve/maintain optimum respiratory ventilation and gas exchange  Outcome: Not Progressing     Problem: Communication  Goal: The ability to communicate needs accurately and effectively will improve  Outcome: Not Progressing     Problem: Bowel Elimination  Goal: Establish and maintain regular bowel function  Outcome: Not Progressing     Problem: Mobility  Goal: Patient's capacity to carry out activities will improve  Outcome: Not Progressing

## 2023-08-26 NOTE — ASSESSMENT & PLAN NOTE
CT guided gallbladder drainage   8/27/2023  IR recommendation -drain to remain in place for 6 weeks, Before catheter removal, a cholecystogram with cholangiogram would be recommended to assure patency of the common duct. Likewise, a tracto-gram may also be helpful to make sure that the catheter tract has matured so that there is not intraperitoneal bile leakage after catheter removal.  IV Cefepime + Flagyl  end date 9/2/2023

## 2023-08-26 NOTE — CARE PLAN
The patient is Unstable - High likelihood or risk of patient condition declining or worsening    Shift Goals  Clinical Goals: pt safety, stable VS, monitor labs  Patient Goals: KUN    Progress made toward(s) clinical / shift goals:    Problem: Knowledge Deficit - Standard  Goal: Patient and family/care givers will demonstrate understanding of plan of care, disease process/condition, diagnostic tests and medications  Outcome: Progressing     Problem: Hemodynamics  Goal: Patient's hemodynamics, fluid balance and neurologic status will be stable or improve  Outcome: Progressing     Problem: Fluid Volume  Goal: Fluid volume balance will be maintained  Outcome: Progressing     Problem: Urinary - Renal Perfusion  Goal: Ability to achieve and maintain adequate renal perfusion and functioning will improve  Outcome: Progressing     Problem: Respiratory  Goal: Patient will achieve/maintain optimum respiratory ventilation and gas exchange  Outcome: Progressing     Problem: Skin Integrity  Goal: Skin integrity is maintained or improved  Outcome: Progressing       Patient is not progressing towards the following goals:

## 2023-08-26 NOTE — PROGRESS NOTES
[facility] INTENSIVIST DIRECT ADMISSION REPORT    Transferring facility: [facility]  Transferring physician: Dr Schmitt  Chief complaint: abdominal pain  Pertinent history & patient course: 67F admitted with abd pain, vomiting - found to have pancolitis and c.diff - started on abx. She had HIDA scan that was c/f chronic cholecystitis so GI recommended transfer for cholecystostomy tube placement with IR. Patient also noted to be encephalopathic - apparently wakes up to painful stimuli but not verbal, concerning for toxometabolic encephalopathy. ABG w/respiratory alkalosis.  Pertinent imaging & lab results: as noted  Code Status: FULL per transferring provider, I personally verified with the transferring provider patient's code status and the transferring provider has confirmed this with the patient.  Further work up or recommendations prior to transfer: none  Consultants called prior to transfer and pertinent input from consultants: none  Patient accepted for transfer: yes  Consultants to be called upon arrival: IR  Floor requested: IMCU  ADT order placed for accepted patient: yes    Please inform the IMCU hospitalist upon assignment of an ICU bed and then again on arrival of the patient.

## 2023-08-26 NOTE — PROGRESS NOTES
Prescription approved per [facility] [facility].     Pt arrived to T601 via gurney with 2xEMS. 1 bag of belongings present. Glasses in bag.     Pt FSBS 197.  DrAshraf to bedside.

## 2023-08-26 NOTE — DIETARY
"Nutrition Support Assessment   Day 1 of admit.  Odalys Keenan is a 67 y.o. female with admitting DX of cholecystitis.     Current problem list:  Hyperglycemia  Leukocytosis  Cholecystitis  Acute respiratory failure with hypoxia  Respiratory alkalosis  Pleural effusion  C. difficile colitis  Acute encephalopathy  Atrial fibrillation with RVR  Alcohol withdrawal delirium  Pancolitis  Seizures     Assessment:  Estimated Nutritional Needs: based on:   Height: 172.7 cm (5' 8\")  Weight: 71 kg (156 lb 8.4 oz)  Weight to Use in Calculations: 71 kg (156 lb 8.4 oz)  Ideal Body Weight: 63.5 kg (140 lb)  Percent Ideal Body Weight: 111.8  Body mass index is 23.8 kg/m²., BMI classification: normal    Calculation/Equation: MSJ x 1.2 = 1566 kcals/day  Total Calories / day: 1566 - 1775 (Calories / k - 25)  Total Grams Protein / day: 85 - 99 (Grams Protein / k.2 - 1.4)     Evaluation:   Admitted from [place] with abdominal pain and vomiting. Pt found to have C. diff, and pancolitis. HIDA scan also showed cholecystitis (cholecystostomy tube pending).   PMH: Crohn's disease, hyperlipidemia, hypertension.   Pt is non-responsive.   NGT placed today, awaiting verification.   Sodium 134, Glucose 271, Alk phos 918, A1c 5.2 ()  SSI, Vancomycin, Thiamine (currently stopped) per MAR  Specialized tube feeding formula indicated to best meet pt's estimated nutrition needs. CHO controlled formula indicated for acute hyperglycemia.   Pt to be monitored for refeeding risk 2' hx of ETOH and low electrolytes. Voalte message sent to MD regarding refeeding and need for Thiamine; awaiting response.      Malnutrition Risk: Unable to determine at this time.      Recommendations/Plan:  Once NGT verified, start Impact Peptide 1.5 @ 10 mL/hr and advance slowly by 10 mL/hr, q8 hours to goal rate of 45 mL/hr. This provides 1620 kcals, 102 grams of protein and 832 mL of free water per day.  Fluids per MD.   Monitor for refeeding: Order BMP w/ " Mg and Phos x 7 days. Replete K, Phos and Mg prn. Supplement 100 mg Thiamine x 7 days to reduce risk of refeeding.    RD following.

## 2023-08-26 NOTE — ASSESSMENT & PLAN NOTE
Question of secondary to cholecystitis along with her C. Difficile infection  8/27 wbc:21 <28.6 <26.7  continue with current antibiotics  Monitor CBC and vitals along with strict I's and O's

## 2023-08-26 NOTE — PROGRESS NOTES
Pt arrived with BMS in place, BMS [place] [facility] [place] and stool sample collected and sent.. 45ml in balloon.

## 2023-08-27 ENCOUNTER — APPOINTMENT (OUTPATIENT)
Dept: RADIOLOGY | Facility: MEDICAL CENTER | Age: 68
DRG: 444 | End: 2023-08-27
Attending: HOSPITALIST
Payer: MEDICARE

## 2023-08-27 PROBLEM — A41.9 SEPSIS (HCC): Status: ACTIVE | Noted: 2023-08-27

## 2023-08-27 LAB
ALBUMIN SERPL BCP-MCNC: 2.6 G/DL (ref 3.2–4.9)
ALBUMIN/GLOB SERPL: 1.1 G/DL
ALP SERPL-CCNC: 789 U/L (ref 30–99)
ALT SERPL-CCNC: 30 U/L (ref 2–50)
ANION GAP SERPL CALC-SCNC: 11 MMOL/L (ref 7–16)
AST SERPL-CCNC: 33 U/L (ref 12–45)
BILIRUB SERPL-MCNC: 0.4 MG/DL (ref 0.1–1.5)
BUN SERPL-MCNC: 14 MG/DL (ref 8–22)
CALCIUM ALBUM COR SERPL-MCNC: 9.3 MG/DL (ref 8.5–10.5)
CALCIUM SERPL-MCNC: 8.2 MG/DL (ref 8.5–10.5)
CHLORIDE SERPL-SCNC: 110 MMOL/L (ref 96–112)
CO2 SERPL-SCNC: 20 MMOL/L (ref 20–33)
CREAT SERPL-MCNC: 0.54 MG/DL (ref 0.5–1.4)
ERYTHROCYTE [DISTWIDTH] IN BLOOD BY AUTOMATED COUNT: 48.9 FL (ref 35.9–50)
GFR SERPLBLD CREATININE-BSD FMLA CKD-EPI: 102 ML/MIN/1.73 M 2
GLOBULIN SER CALC-MCNC: 2.4 G/DL (ref 1.9–3.5)
GLUCOSE BLD STRIP.AUTO-MCNC: 105 MG/DL (ref 65–99)
GLUCOSE BLD STRIP.AUTO-MCNC: 107 MG/DL (ref 65–99)
GLUCOSE BLD STRIP.AUTO-MCNC: 128 MG/DL (ref 65–99)
GLUCOSE BLD STRIP.AUTO-MCNC: 85 MG/DL (ref 65–99)
GLUCOSE SERPL-MCNC: 128 MG/DL (ref 65–99)
GRAM STN SPEC: NORMAL
HCT VFR BLD AUTO: 29.6 % (ref 37–47)
HGB BLD-MCNC: 10.2 G/DL (ref 12–16)
MAGNESIUM SERPL-MCNC: 1.8 MG/DL (ref 1.5–2.5)
MCH RBC QN AUTO: 31.9 PG (ref 27–33)
MCHC RBC AUTO-ENTMCNC: 34.5 G/DL (ref 32.2–35.5)
MCV RBC AUTO: 92.5 FL (ref 81.4–97.8)
PHOSPHATE SERPL-MCNC: 2.5 MG/DL (ref 2.5–4.5)
PLATELET # BLD AUTO: 561 K/UL (ref 164–446)
PMV BLD AUTO: 11.1 FL (ref 9–12.9)
POTASSIUM SERPL-SCNC: 3.6 MMOL/L (ref 3.6–5.5)
PROT SERPL-MCNC: 5 G/DL (ref 6–8.2)
RBC # BLD AUTO: 3.2 M/UL (ref 4.2–5.4)
SIGNIFICANT IND 70042: NORMAL
SITE SITE: NORMAL
SODIUM SERPL-SCNC: 141 MMOL/L (ref 135–145)
SOURCE SOURCE: NORMAL
WBC # BLD AUTO: 21 K/UL (ref 4.8–10.8)

## 2023-08-27 PROCEDURE — 700102 HCHG RX REV CODE 250 W/ 637 OVERRIDE(OP): Mod: JZ | Performed by: STUDENT IN AN ORGANIZED HEALTH CARE EDUCATION/TRAINING PROGRAM

## 2023-08-27 PROCEDURE — 85027 COMPLETE CBC AUTOMATED: CPT

## 2023-08-27 PROCEDURE — 770020 HCHG ROOM/CARE - TELE (206)

## 2023-08-27 PROCEDURE — 99233 SBSQ HOSP IP/OBS HIGH 50: CPT | Performed by: HOSPITALIST

## 2023-08-27 PROCEDURE — 0F9430Z DRAINAGE OF GALLBLADDER WITH DRAINAGE DEVICE, PERCUTANEOUS APPROACH: ICD-10-PCS | Performed by: RADIOLOGY

## 2023-08-27 PROCEDURE — A5200 PERCUTANEOUS CATHETER ANCHOR: HCPCS

## 2023-08-27 PROCEDURE — 87205 SMEAR GRAM STAIN: CPT

## 2023-08-27 PROCEDURE — 83735 ASSAY OF MAGNESIUM: CPT

## 2023-08-27 PROCEDURE — 87070 CULTURE OTHR SPECIMN AEROBIC: CPT

## 2023-08-27 PROCEDURE — A9270 NON-COVERED ITEM OR SERVICE: HCPCS | Mod: JZ | Performed by: STUDENT IN AN ORGANIZED HEALTH CARE EDUCATION/TRAINING PROGRAM

## 2023-08-27 PROCEDURE — 80053 COMPREHEN METABOLIC PANEL: CPT

## 2023-08-27 PROCEDURE — 700105 HCHG RX REV CODE 258: Performed by: HOSPITALIST

## 2023-08-27 PROCEDURE — 700111 HCHG RX REV CODE 636 W/ 250 OVERRIDE (IP): Mod: JZ | Performed by: HOSPITALIST

## 2023-08-27 PROCEDURE — 82962 GLUCOSE BLOOD TEST: CPT | Mod: 91

## 2023-08-27 PROCEDURE — 700101 HCHG RX REV CODE 250: Performed by: HOSPITALIST

## 2023-08-27 PROCEDURE — A9270 NON-COVERED ITEM OR SERVICE: HCPCS | Performed by: HOSPITALIST

## 2023-08-27 PROCEDURE — 700111 HCHG RX REV CODE 636 W/ 250 OVERRIDE (IP)

## 2023-08-27 PROCEDURE — 700102 HCHG RX REV CODE 250 W/ 637 OVERRIDE(OP): Performed by: HOSPITALIST

## 2023-08-27 PROCEDURE — 84100 ASSAY OF PHOSPHORUS: CPT

## 2023-08-27 RX ORDER — LIDOCAINE 50 MG/G
1 PATCH TOPICAL EVERY 24 HOURS
Status: DISCONTINUED | OUTPATIENT
Start: 2023-08-27 | End: 2023-08-29 | Stop reason: HOSPADM

## 2023-08-27 RX ORDER — POTASSIUM CHLORIDE 20 MEQ/1
40 TABLET, EXTENDED RELEASE ORAL ONCE
Status: COMPLETED | OUTPATIENT
Start: 2023-08-27 | End: 2023-08-27

## 2023-08-27 RX ORDER — MIDAZOLAM HYDROCHLORIDE 1 MG/ML
.5-2 INJECTION INTRAMUSCULAR; INTRAVENOUS PRN
Status: ACTIVE | OUTPATIENT
Start: 2023-08-27 | End: 2023-08-27

## 2023-08-27 RX ORDER — SODIUM CHLORIDE 9 MG/ML
500 INJECTION, SOLUTION INTRAVENOUS
Status: ACTIVE | OUTPATIENT
Start: 2023-08-27 | End: 2023-08-27

## 2023-08-27 RX ORDER — ONDANSETRON 2 MG/ML
4 INJECTION INTRAMUSCULAR; INTRAVENOUS PRN
Status: ACTIVE | OUTPATIENT
Start: 2023-08-27 | End: 2023-08-27

## 2023-08-27 RX ORDER — MAGNESIUM SULFATE HEPTAHYDRATE 40 MG/ML
2 INJECTION, SOLUTION INTRAVENOUS ONCE
Status: COMPLETED | OUTPATIENT
Start: 2023-08-27 | End: 2023-08-27

## 2023-08-27 RX ORDER — MIDAZOLAM HYDROCHLORIDE 1 MG/ML
INJECTION INTRAMUSCULAR; INTRAVENOUS
Status: COMPLETED
Start: 2023-08-27 | End: 2023-08-27

## 2023-08-27 RX ADMIN — PIPERACILLIN AND TAZOBACTAM 3.38 G: 3; .375 INJECTION, POWDER, FOR SOLUTION INTRAVENOUS at 21:18

## 2023-08-27 RX ADMIN — METOPROLOL TARTRATE 12.5 MG: 25 TABLET, FILM COATED ORAL at 05:26

## 2023-08-27 RX ADMIN — LEVETIRACETAM 500 MG: 100 INJECTION, SOLUTION, CONCENTRATE INTRAVENOUS at 05:36

## 2023-08-27 RX ADMIN — PIPERACILLIN AND TAZOBACTAM 3.38 G: 3; .375 INJECTION, POWDER, FOR SOLUTION INTRAVENOUS at 05:44

## 2023-08-27 RX ADMIN — VANCOMYCIN HYDROCHLORIDE 500 MG: 5 INJECTION, POWDER, LYOPHILIZED, FOR SOLUTION INTRAVENOUS at 00:38

## 2023-08-27 RX ADMIN — FENTANYL CITRATE 50 MCG: 50 INJECTION, SOLUTION INTRAMUSCULAR; INTRAVENOUS at 15:40

## 2023-08-27 RX ADMIN — LEVETIRACETAM 500 MG: 100 INJECTION, SOLUTION, CONCENTRATE INTRAVENOUS at 17:10

## 2023-08-27 RX ADMIN — POTASSIUM CHLORIDE 40 MEQ: 1500 TABLET, EXTENDED RELEASE ORAL at 05:26

## 2023-08-27 RX ADMIN — METRONIDAZOLE 500 MG: 5 INJECTION, SOLUTION INTRAVENOUS at 14:55

## 2023-08-27 RX ADMIN — PIPERACILLIN AND TAZOBACTAM 3.38 G: 3; .375 INJECTION, POWDER, FOR SOLUTION INTRAVENOUS at 12:18

## 2023-08-27 RX ADMIN — MIDAZOLAM HYDROCHLORIDE 1 MG: 1 INJECTION INTRAMUSCULAR; INTRAVENOUS at 15:40

## 2023-08-27 RX ADMIN — VANCOMYCIN HYDROCHLORIDE 500 MG: 5 INJECTION, POWDER, LYOPHILIZED, FOR SOLUTION INTRAVENOUS at 05:26

## 2023-08-27 RX ADMIN — THIAMINE HYDROCHLORIDE 100 MG: 100 INJECTION, SOLUTION INTRAMUSCULAR; INTRAVENOUS at 05:42

## 2023-08-27 RX ADMIN — MAGNESIUM SULFATE HEPTAHYDRATE 2 G: 2 INJECTION, SOLUTION INTRAVENOUS at 09:21

## 2023-08-27 RX ADMIN — METOPROLOL TARTRATE 12.5 MG: 25 TABLET, FILM COATED ORAL at 17:09

## 2023-08-27 RX ADMIN — MIDAZOLAM 1 MG: 1 INJECTION, SOLUTION INTRAMUSCULAR; INTRAVENOUS at 15:40

## 2023-08-27 RX ADMIN — METRONIDAZOLE 500 MG: 5 INJECTION, SOLUTION INTRAVENOUS at 21:24

## 2023-08-27 RX ADMIN — SODIUM CHLORIDE: 9 INJECTION, SOLUTION INTRAVENOUS at 14:51

## 2023-08-27 RX ADMIN — VANCOMYCIN HYDROCHLORIDE 500 MG: 5 INJECTION, POWDER, LYOPHILIZED, FOR SOLUTION INTRAVENOUS at 23:11

## 2023-08-27 RX ADMIN — METRONIDAZOLE 500 MG: 5 INJECTION, SOLUTION INTRAVENOUS at 05:42

## 2023-08-27 RX ADMIN — VANCOMYCIN HYDROCHLORIDE 500 MG: 5 INJECTION, POWDER, LYOPHILIZED, FOR SOLUTION INTRAVENOUS at 12:13

## 2023-08-27 RX ADMIN — VANCOMYCIN HYDROCHLORIDE 500 MG: 5 INJECTION, POWDER, LYOPHILIZED, FOR SOLUTION INTRAVENOUS at 17:10

## 2023-08-27 ASSESSMENT — ENCOUNTER SYMPTOMS
HEADACHES: 0
ABDOMINAL PAIN: 1
NAUSEA: 0
NERVOUS/ANXIOUS: 0
SHORTNESS OF BREATH: 0

## 2023-08-27 ASSESSMENT — PAIN DESCRIPTION - PAIN TYPE
TYPE: ACUTE PAIN
TYPE: CHRONIC PAIN
TYPE: CHRONIC PAIN

## 2023-08-27 ASSESSMENT — PATIENT HEALTH QUESTIONNAIRE - PHQ9
SUM OF ALL RESPONSES TO PHQ9 QUESTIONS 1 AND 2: 0
2. FEELING DOWN, DEPRESSED, IRRITABLE, OR HOPELESS: NOT AT ALL
1. LITTLE INTEREST OR PLEASURE IN DOING THINGS: NOT AT ALL

## 2023-08-27 ASSESSMENT — FIBROSIS 4 INDEX
FIB4 SCORE: 0.7
FIB4 SCORE: 0.7

## 2023-08-27 NOTE — CARE PLAN
The patient is Watcher - Medium risk of patient condition declining or worsening    Shift Goals  Clinical Goals: improve HR and breathing, monitor labs, pt safety  Patient Goals: KUN  Family Goals: KUN    Progress made toward(s) clinical / shift goals:    Problem: Knowledge Deficit - Standard  Goal: Patient and family/care givers will demonstrate understanding of plan of care, disease process/condition, diagnostic tests and medications  Outcome: Progressing     Problem: Hemodynamics  Goal: Patient's hemodynamics, fluid balance and neurologic status will be stable or improve  Outcome: Progressing     Problem: Fluid Volume  Goal: Fluid volume balance will be maintained  Outcome: Progressing     Problem: Urinary - Renal Perfusion  Goal: Ability to achieve and maintain adequate renal perfusion and functioning will improve  Outcome: Progressing     Problem: Respiratory  Goal: Patient will achieve/maintain optimum respiratory ventilation and gas exchange  Outcome: Progressing     Problem: Physical Regulation  Goal: Diagnostic test results will improve  Outcome: Progressing  Goal: Signs and symptoms of infection will decrease  Outcome: Progressing     Problem: Skin Integrity  Goal: Skin integrity is maintained or improved  Outcome: Progressing     Problem: Bowel Elimination  Goal: Establish and maintain regular bowel function  Outcome: Progressing     Problem: Mobility  Goal: Patient's capacity to carry out activities will improve  Outcome: Progressing       Patient is not progressing towards the following goals:

## 2023-08-27 NOTE — PROGRESS NOTES
Pt presents to CT4. Sister,  was consented by MD at bedside, confirmed by this RN and consent at bedside. Pt transferred to CT4 table in Supine position. Patient underwent a Gall bladder drain (Cholecystostomy) by Dr. Jean Baptiste. Procedure site was marked by MD and verified using imaging guidance. Pt placed on monitor, prepped and draped in a sterile fashion. Vitals were taken every 5 minutes and remained stable during procedure (see doc flow sheet for results). CO2 waveform capnography was monitored and remained WNL throughout procedure. Report called to Isaiah HARRY. Pt transported by stretcher with RN to ICU.     Specimen: Gall bladder aspiration in Sterile container hand delivered to lab.    8 F drain placed in RUQ  REF: [id number]   LOT: [id number]  EXP: 6/29/2026

## 2023-08-27 NOTE — OR SURGEON
Immediate Post- Operative Note        Findings: Cholecystitis      Procedure(s): CT Drainage of Gallbladder      Estimated Blood Loss: Less than 5 ml        Complications: None            8/27/2023     3:57 PM     Enrico Jean Baptiste M.D.

## 2023-08-27 NOTE — PROGRESS NOTES
Called sister Candida to inform her pt is in hospital, got 2 RN consent for procedures. Informed her that Bi Rodriguez and Markel will be contacting her for updates and further procedural consents. On schedule with IR for this AM but tube feeds have been running at 35mL/hr- turned off at 0800. Discussed with Yusuf Sharp in IR.

## 2023-08-27 NOTE — PROGRESS NOTES
Hospital Medicine Daily Progress Note    Date of Service  2023    Chief Complaint  Altered mentation and abdominal pain    Hospital Course  Odalys Keenan is a 67 y.o. female with a history of alcoholic liver disease, Crohn's disease (previously on immune modulators), and seizures.  She was transferred from [facility] in [place] for suicidal ideations, left-sided abdominal pain along with nausea vomiting and alcohol withdrawal.  At [facility] she was in atrial fibrillation with RVR but converted to normal sinus rhythm with pushes of Cardizem.  At [facility] she was admitted 2023.  Patient was noted to be C. difficile positive on  and imaging with a CT showed pancolitis.  She was initiated on IV Flagyl and oral vancomycin.  There was a concern of chronic cholecystitis and a cholecystostomy tube was recommended.    Interval Problem Update  : wbc:21, M.8. More alert.  Speech soft.  Awaits Perc ken tube placement.  I spoke to patient's sister and updated her of Odalys's current condition and treatment plan. Scant urine output this am per RN although UOP shows 1375cc overnight.    I have discussed this patient's plan of care and discharge plan at IDT rounds today with Case Management, Nursing, Nursing leadership, and other members of the IDT team.      Code Status  Full Code    Disposition  The patient is not medically cleared for discharge to home or a post-acute facility.      I have placed the appropriate orders for post-discharge needs.    Review of Systems  Review of Systems   Constitutional:  Positive for malaise/fatigue.   Respiratory:  Negative for shortness of breath.    Cardiovascular:  Negative for leg swelling.   Gastrointestinal:  Positive for abdominal pain. Negative for nausea.   Neurological:  Negative for headaches.   Psychiatric/Behavioral:  The patient is not nervous/anxious.         Physical Exam  Temp:  [36.6 °C (97.9 °F)] 36.6 °C (97.9 °F)  Pulse:   [112-127] 116  Resp:  [26-69] 32  BP: (131-156)/(69-82) 133/69  SpO2:  [91 %-97 %] 92 %        Fluids    Intake/Output Summary (Last 24 hours) at 8/27/2023 1155  Last data filed at 8/27/2023 0921  Gross per 24 hour   Intake 1106.62 ml   Output 3400 ml   Net -2293.38 ml       Laboratory  Recent Labs     08/25/23 2005 08/26/23 0339 08/27/23  0030   WBC 26.7* 28.6* 21.0*   RBC 3.82* 3.67* 3.20*   HEMOGLOBIN 12.2 11.6* 10.2*   HEMATOCRIT 35.1* 33.7* 29.6*   MCV 91.9 91.8 92.5   MCH 31.9 31.6 31.9   MCHC 34.8 34.4 34.5   RDW 48.1 48.2 48.9   PLATELETCT 412 460* 561*   MPV 11.9 11.8 11.1     Recent Labs     08/25/23 2005 08/26/23 0339 08/27/23  0030   SODIUM 133* 134* 141   POTASSIUM 3.6 4.7 3.6   CHLORIDE 105 104 110   CO2 16* 19* 20   GLUCOSE 202* 271* 128*   BUN 17 16 14   CREATININE 0.51 0.52 0.54   CALCIUM 8.4* 7.7* 8.2*     Recent Labs     08/25/23  2315   APTT 23.9*   INR 0.89               Imaging  US-CHEST   Final Result      Minimal left pleural fluid is identified.      DX-CHEST-LIMITED (1 VIEW)   Final Result         1.  Pulmonary edema and/or infiltrates, similar compared to prior study.   2.  Trace bilateral pleural effusions      CT-DRAIN-CHOLECTYSTOSTOMY    (Results Pending)        Assessment/Plan  * Pancolitis (HCC)- (present on admission)  Assessment & Plan  C. difficile positive at outside facility as of 8/17  Serial abdominal exams and x-rays  IV Flagyl and p.o. Vanco  Elevated ESR 31, CRP:0.8  IV fluids    Leukocytosis  Assessment & Plan  Question of secondary to cholecystitis along with her C. Difficile infection  8/27 wbc:21 <28.6 <26.7  continue with current antibiotics  Monitor CBC and vitals along with strict I's and O's    Hyperglycemia  Assessment & Plan  Glucose levels in the 200s over the last few days.  8/26/2023 glycohemoglobin: 5.2    Acute encephalopathy  Assessment & Plan  Unsure why she is so encephalopathic and unresponsive  Possible she got Ativan and has poor metabolism  8/27  more awake.  Concern of likely infectious and metabolic encephalopathy  Normal ammonia     C. difficile colitis  Assessment & Plan  Monitor for toxic megacolon  P.o. Vanco and Flagyl  Still he has watery bowel movements  Watch volume status.    Pleural effusion  Assessment & Plan  Blood pressures stable  We will go ahead and initiate low-dose Lasix IV for anasarca  Monitor blood pressures  Strict I's and O's and labs being monitored    Respiratory alkalosis  Assessment & Plan  8/26 serum bicarb 19 question of secondary to volume loss from diarrhea  8/27 CO2:20  Monitor labs watch respiratory status RT per protocol    Acute respiratory failure with hypoxia (HCC)  Assessment & Plan  On 3 L of O2 above baseline  Monitor SpO2  RT per protocol   watch volume status.    Cholecystitis- (present on admission)  Assessment & Plan  Chronic cholecystitis found on HIDA scan at [facility]  IV Zosyn  cholecystostomy tube pending placement today  MRCP was not completed at the other facility which GI recommended  HIDA scan: Gallbladder is not felt to be visualized throughout the exam consistent with chronic cholecystitis.    Atrial fibrillation with RVR (HCC)- (present on admission)  Assessment & Plan  In normal sinus rhythm  Continue metoprolol  Not anticoagulated  8/25 TSH:5.98  FT4:1.07  Keep electrolytes within normal limits  Patient had a very low magnesium level which has been repleted as of 8/20 6 AM    Alcohol withdrawal delirium (HCC)- (present on admission)  Assessment & Plan  Patient will be admitted to the telemetry unit with close cardiac monitoring   Started on rally bag, multivitamin, thiamine and folate  Replete electrolytes  Patient has been counseled on alcohol cessation and will be provided with information for outpatient detox facilities  I will hold off on CIWA protocol since she has been in the hospital for multiple days and is completely sedated  Encouraged cessation    Seizures (HCC)- (present on  admission)  Assessment & Plan  Continue Keppra 500 mg IV every 12 hours once able to take orals convert to oral  Question of this is secondary to her history of alcohol will need further delineation from her history         VTE prophylaxis:   SCDs/TEDs      I have performed a physical exam and reviewed and updated ROS and Plan today (8/27/2023). In review of yesterday's note (8/26/2023), there are no changes except as documented above.        Physical Exam  Vitals reviewed.   Constitutional:       Appearance: She is ill-appearing.   HENT:      Head: Normocephalic and atraumatic.      Nose:      Comments: NG tube present     Mouth/Throat:      Mouth: Mucous membranes are dry.   Eyes:      General:         Right eye: No discharge.         Left eye: No discharge.      Extraocular Movements: Extraocular movements intact.      Conjunctiva/sclera: Conjunctivae normal.   Cardiovascular:      Rate and Rhythm: Regular rhythm. Tachycardia present.      Heart sounds: No murmur heard.  Pulmonary:      Effort: No respiratory distress.      Breath sounds: Normal breath sounds. No wheezing.   Abdominal:      General: Bowel sounds are normal. There is no distension.      Palpations: Abdomen is soft.      Tenderness: There is abdominal tenderness.   Musculoskeletal:      Cervical back: Neck supple.      Right lower leg: No edema.      Left lower leg: No edema.   Lymphadenopathy:      Cervical: No cervical adenopathy.   Skin:     General: Skin is warm.   Neurological:      Mental Status: She is lethargic.   Psychiatric:         Mood and Affect: Mood is not anxious.         Speech: Speech is delayed.         Behavior: Behavior is slowed.

## 2023-08-27 NOTE — PROGRESS NOTES
Pt rectal tube found to be leaking; balloon checked and had 30mL of water in it. Inflated to appropriate 45mL, tube flushed and cleaned.

## 2023-08-27 NOTE — PROGRESS NOTES
Hospitalist notified that pt is having more frequent PVCs. Hospitalist made aware of pt's previous electrolyte levels. Additional labs ordered. Meds ordered (see MAR).

## 2023-08-28 ENCOUNTER — APPOINTMENT (OUTPATIENT)
Dept: RADIOLOGY | Facility: MEDICAL CENTER | Age: 68
DRG: 444 | End: 2023-08-28
Payer: MEDICARE

## 2023-08-28 PROBLEM — E83.42 HYPOMAGNESEMIA: Status: ACTIVE | Noted: 2023-08-28

## 2023-08-28 PROBLEM — R79.89 ELEVATED TSH: Status: ACTIVE | Noted: 2023-08-28

## 2023-08-28 PROBLEM — I48.0 PAROXYSMAL ATRIAL FIBRILLATION (HCC): Status: ACTIVE | Noted: 2023-08-28

## 2023-08-28 PROBLEM — K50.90 CROHN'S DISEASE (HCC): Status: ACTIVE | Noted: 2023-08-28

## 2023-08-28 PROBLEM — E87.6 HYPOKALEMIA: Status: ACTIVE | Noted: 2023-08-28

## 2023-08-28 LAB
ALBUMIN SERPL BCP-MCNC: 2.8 G/DL (ref 3.2–4.9)
ALBUMIN/GLOB SERPL: 1.3 G/DL
ALP SERPL-CCNC: 519 U/L (ref 30–99)
ALT SERPL-CCNC: 22 U/L (ref 2–50)
AMMONIA PLAS-SCNC: 21 UMOL/L (ref 11–45)
ANION GAP SERPL CALC-SCNC: 10 MMOL/L (ref 7–16)
ANION GAP SERPL CALC-SCNC: 9 MMOL/L (ref 7–16)
AST SERPL-CCNC: 24 U/L (ref 12–45)
BILIRUB SERPL-MCNC: 0.4 MG/DL (ref 0.1–1.5)
BUN SERPL-MCNC: 11 MG/DL (ref 8–22)
BUN SERPL-MCNC: 11 MG/DL (ref 8–22)
CALCIUM ALBUM COR SERPL-MCNC: 9 MG/DL (ref 8.5–10.5)
CALCIUM SERPL-MCNC: 7.8 MG/DL (ref 8.5–10.5)
CALCIUM SERPL-MCNC: 8 MG/DL (ref 8.5–10.5)
CHLORIDE SERPL-SCNC: 106 MMOL/L (ref 96–112)
CHLORIDE SERPL-SCNC: 109 MMOL/L (ref 96–112)
CO2 SERPL-SCNC: 20 MMOL/L (ref 20–33)
CO2 SERPL-SCNC: 22 MMOL/L (ref 20–33)
CREAT SERPL-MCNC: 0.49 MG/DL (ref 0.5–1.4)
CREAT SERPL-MCNC: 0.52 MG/DL (ref 0.5–1.4)
ERYTHROCYTE [DISTWIDTH] IN BLOOD BY AUTOMATED COUNT: 52.5 FL (ref 35.9–50)
GFR SERPLBLD CREATININE-BSD FMLA CKD-EPI: 102 ML/MIN/1.73 M 2
GFR SERPLBLD CREATININE-BSD FMLA CKD-EPI: 104 ML/MIN/1.73 M 2
GLOBULIN SER CALC-MCNC: 2.2 G/DL (ref 1.9–3.5)
GLUCOSE BLD STRIP.AUTO-MCNC: 113 MG/DL (ref 65–99)
GLUCOSE BLD STRIP.AUTO-MCNC: 115 MG/DL (ref 65–99)
GLUCOSE BLD STRIP.AUTO-MCNC: 117 MG/DL (ref 65–99)
GLUCOSE BLD STRIP.AUTO-MCNC: 120 MG/DL (ref 65–99)
GLUCOSE BLD STRIP.AUTO-MCNC: 156 MG/DL (ref 65–99)
GLUCOSE SERPL-MCNC: 125 MG/DL (ref 65–99)
GLUCOSE SERPL-MCNC: 141 MG/DL (ref 65–99)
HCT VFR BLD AUTO: 29.2 % (ref 37–47)
HGB BLD-MCNC: 9.8 G/DL (ref 12–16)
MAGNESIUM SERPL-MCNC: 1.8 MG/DL (ref 1.5–2.5)
MCH RBC QN AUTO: 32.6 PG (ref 27–33)
MCHC RBC AUTO-ENTMCNC: 33.6 G/DL (ref 32.2–35.5)
MCV RBC AUTO: 97 FL (ref 81.4–97.8)
NT-PROBNP SERPL IA-MCNC: 399 PG/ML (ref 0–125)
PHOSPHATE SERPL-MCNC: 3 MG/DL (ref 2.5–4.5)
PLATELET # BLD AUTO: 684 K/UL (ref 164–446)
PMV BLD AUTO: 10.8 FL (ref 9–12.9)
POTASSIUM SERPL-SCNC: 3.3 MMOL/L (ref 3.6–5.5)
POTASSIUM SERPL-SCNC: 3.4 MMOL/L (ref 3.6–5.5)
PREALB SERPL-MCNC: 30.2 MG/DL (ref 18–38)
PROT SERPL-MCNC: 5 G/DL (ref 6–8.2)
RBC # BLD AUTO: 3.01 M/UL (ref 4.2–5.4)
SODIUM SERPL-SCNC: 138 MMOL/L (ref 135–145)
SODIUM SERPL-SCNC: 138 MMOL/L (ref 135–145)
T3FREE SERPL-MCNC: 2.19 PG/ML (ref 2–4.4)
VIT B12 SERPL-MCNC: 3310 PG/ML (ref 211–911)
WBC # BLD AUTO: 15.3 K/UL (ref 4.8–10.8)

## 2023-08-28 PROCEDURE — 700111 HCHG RX REV CODE 636 W/ 250 OVERRIDE (IP): Mod: JZ | Performed by: HOSPITALIST

## 2023-08-28 PROCEDURE — A9270 NON-COVERED ITEM OR SERVICE: HCPCS | Performed by: FAMILY MEDICINE

## 2023-08-28 PROCEDURE — 82607 VITAMIN B-12: CPT

## 2023-08-28 PROCEDURE — 80048 BASIC METABOLIC PNL TOTAL CA: CPT

## 2023-08-28 PROCEDURE — 82962 GLUCOSE BLOOD TEST: CPT | Mod: 91

## 2023-08-28 PROCEDURE — 770020 HCHG ROOM/CARE - TELE (206)

## 2023-08-28 PROCEDURE — 80053 COMPREHEN METABOLIC PANEL: CPT

## 2023-08-28 PROCEDURE — 83735 ASSAY OF MAGNESIUM: CPT

## 2023-08-28 PROCEDURE — 700101 HCHG RX REV CODE 250: Performed by: HOSPITALIST

## 2023-08-28 PROCEDURE — 71045 X-RAY EXAM CHEST 1 VIEW: CPT

## 2023-08-28 PROCEDURE — 700105 HCHG RX REV CODE 258: Performed by: INTERNAL MEDICINE

## 2023-08-28 PROCEDURE — 84100 ASSAY OF PHOSPHORUS: CPT

## 2023-08-28 PROCEDURE — 99223 1ST HOSP IP/OBS HIGH 75: CPT | Performed by: INTERNAL MEDICINE

## 2023-08-28 PROCEDURE — 82140 ASSAY OF AMMONIA: CPT

## 2023-08-28 PROCEDURE — 700111 HCHG RX REV CODE 636 W/ 250 OVERRIDE (IP): Mod: JZ

## 2023-08-28 PROCEDURE — 85027 COMPLETE CBC AUTOMATED: CPT

## 2023-08-28 PROCEDURE — 700111 HCHG RX REV CODE 636 W/ 250 OVERRIDE (IP): Performed by: FAMILY MEDICINE

## 2023-08-28 PROCEDURE — 700102 HCHG RX REV CODE 250 W/ 637 OVERRIDE(OP): Performed by: HOSPITALIST

## 2023-08-28 PROCEDURE — 84481 FREE ASSAY (FT-3): CPT

## 2023-08-28 PROCEDURE — 700102 HCHG RX REV CODE 250 W/ 637 OVERRIDE(OP): Performed by: FAMILY MEDICINE

## 2023-08-28 PROCEDURE — 700105 HCHG RX REV CODE 258: Performed by: HOSPITALIST

## 2023-08-28 PROCEDURE — 99233 SBSQ HOSP IP/OBS HIGH 50: CPT | Performed by: FAMILY MEDICINE

## 2023-08-28 PROCEDURE — 84134 ASSAY OF PREALBUMIN: CPT

## 2023-08-28 PROCEDURE — 94760 N-INVAS EAR/PLS OXIMETRY 1: CPT

## 2023-08-28 PROCEDURE — 83880 ASSAY OF NATRIURETIC PEPTIDE: CPT

## 2023-08-28 PROCEDURE — 700111 HCHG RX REV CODE 636 W/ 250 OVERRIDE (IP): Mod: JZ | Performed by: INTERNAL MEDICINE

## 2023-08-28 PROCEDURE — A9270 NON-COVERED ITEM OR SERVICE: HCPCS | Performed by: HOSPITALIST

## 2023-08-28 RX ORDER — FUROSEMIDE 10 MG/ML
40 INJECTION INTRAMUSCULAR; INTRAVENOUS ONCE
Status: COMPLETED | OUTPATIENT
Start: 2023-08-28 | End: 2023-08-28

## 2023-08-28 RX ORDER — POTASSIUM CHLORIDE 7.45 MG/ML
10 INJECTION INTRAVENOUS
Status: COMPLETED | OUTPATIENT
Start: 2023-08-28 | End: 2023-08-28

## 2023-08-28 RX ORDER — MAGNESIUM SULFATE HEPTAHYDRATE 40 MG/ML
2 INJECTION, SOLUTION INTRAVENOUS ONCE
Status: COMPLETED | OUTPATIENT
Start: 2023-08-28 | End: 2023-08-28

## 2023-08-28 RX ORDER — GAUZE BANDAGE 2" X 2"
100 BANDAGE TOPICAL DAILY
Status: DISCONTINUED | OUTPATIENT
Start: 2023-08-28 | End: 2023-08-29 | Stop reason: HOSPADM

## 2023-08-28 RX ORDER — FOLIC ACID 1 MG/1
1 TABLET ORAL DAILY
Status: DISCONTINUED | OUTPATIENT
Start: 2023-08-28 | End: 2023-08-29 | Stop reason: HOSPADM

## 2023-08-28 RX ADMIN — POTASSIUM CHLORIDE 10 MEQ: 7.46 INJECTION, SOLUTION INTRAVENOUS at 07:59

## 2023-08-28 RX ADMIN — POTASSIUM CHLORIDE 10 MEQ: 7.46 INJECTION, SOLUTION INTRAVENOUS at 09:32

## 2023-08-28 RX ADMIN — POTASSIUM CHLORIDE 10 MEQ: 7.46 INJECTION, SOLUTION INTRAVENOUS at 11:01

## 2023-08-28 RX ADMIN — CEFEPIME 2 G: 2 INJECTION, POWDER, FOR SOLUTION INTRAVENOUS at 11:00

## 2023-08-28 RX ADMIN — CEFEPIME 2 G: 2 INJECTION, POWDER, FOR SOLUTION INTRAVENOUS at 21:29

## 2023-08-28 RX ADMIN — LIDOCAINE PATCH 5% 1 PATCH: 700 PATCH TOPICAL at 03:55

## 2023-08-28 RX ADMIN — ACETAMINOPHEN 650 MG: 325 TABLET, FILM COATED ORAL at 14:39

## 2023-08-28 RX ADMIN — THIAMINE HYDROCHLORIDE 100 MG: 100 INJECTION, SOLUTION INTRAMUSCULAR; INTRAVENOUS at 05:14

## 2023-08-28 RX ADMIN — VANCOMYCIN HYDROCHLORIDE 500 MG: 5 INJECTION, POWDER, LYOPHILIZED, FOR SOLUTION INTRAVENOUS at 05:20

## 2023-08-28 RX ADMIN — FUROSEMIDE 40 MG: 10 INJECTION INTRAMUSCULAR; INTRAVENOUS at 05:54

## 2023-08-28 RX ADMIN — DICLOFENAC SODIUM 2 G: 10 GEL TOPICAL at 09:28

## 2023-08-28 RX ADMIN — LEVETIRACETAM 500 MG: 100 INJECTION, SOLUTION, CONCENTRATE INTRAVENOUS at 17:46

## 2023-08-28 RX ADMIN — VANCOMYCIN HYDROCHLORIDE 500 MG: 5 INJECTION, POWDER, LYOPHILIZED, FOR SOLUTION INTRAVENOUS at 11:04

## 2023-08-28 RX ADMIN — PIPERACILLIN AND TAZOBACTAM 3.38 G: 3; .375 INJECTION, POWDER, FOR SOLUTION INTRAVENOUS at 05:19

## 2023-08-28 RX ADMIN — METOPROLOL TARTRATE 12.5 MG: 25 TABLET, FILM COATED ORAL at 17:46

## 2023-08-28 RX ADMIN — Medication 100 MG: at 12:48

## 2023-08-28 RX ADMIN — VANCOMYCIN HYDROCHLORIDE 500 MG: 5 INJECTION, POWDER, LYOPHILIZED, FOR SOLUTION INTRAVENOUS at 23:17

## 2023-08-28 RX ADMIN — MAGNESIUM SULFATE HEPTAHYDRATE 2 G: 2 INJECTION, SOLUTION INTRAVENOUS at 12:54

## 2023-08-28 RX ADMIN — ACETAMINOPHEN 650 MG: 325 TABLET, FILM COATED ORAL at 23:16

## 2023-08-28 RX ADMIN — LEVETIRACETAM 500 MG: 100 INJECTION, SOLUTION, CONCENTRATE INTRAVENOUS at 05:20

## 2023-08-28 RX ADMIN — VANCOMYCIN HYDROCHLORIDE 500 MG: 5 INJECTION, POWDER, LYOPHILIZED, FOR SOLUTION INTRAVENOUS at 17:46

## 2023-08-28 RX ADMIN — METRONIDAZOLE 500 MG: 5 INJECTION, SOLUTION INTRAVENOUS at 21:24

## 2023-08-28 RX ADMIN — ACETAMINOPHEN 650 MG: 325 TABLET, FILM COATED ORAL at 03:55

## 2023-08-28 RX ADMIN — METOPROLOL TARTRATE 12.5 MG: 25 TABLET, FILM COATED ORAL at 05:20

## 2023-08-28 RX ADMIN — FOLIC ACID 1 MG: 1 TABLET ORAL at 12:47

## 2023-08-28 RX ADMIN — METRONIDAZOLE 500 MG: 5 INJECTION, SOLUTION INTRAVENOUS at 14:39

## 2023-08-28 RX ADMIN — METRONIDAZOLE 500 MG: 5 INJECTION, SOLUTION INTRAVENOUS at 05:55

## 2023-08-28 ASSESSMENT — ENCOUNTER SYMPTOMS
ABDOMINAL PAIN: 1
WEAKNESS: 0
DIAPHORESIS: 0
DIZZINESS: 0
FOCAL WEAKNESS: 0
NAUSEA: 0
CHILLS: 0
SPEECH CHANGE: 0
NERVOUS/ANXIOUS: 1
SHORTNESS OF BREATH: 0
FEVER: 0
SORE THROAT: 0
HEARTBURN: 0
FLANK PAIN: 0
WEAKNESS: 1
WHEEZING: 0
DIARRHEA: 0
BACK PAIN: 1
HEADACHES: 0
BLURRED VISION: 0
SENSORY CHANGE: 0
NECK PAIN: 0
MYALGIAS: 1
PALPITATIONS: 0
VOMITING: 0
COUGH: 0

## 2023-08-28 ASSESSMENT — PAIN DESCRIPTION - PAIN TYPE: TYPE: ACUTE PAIN

## 2023-08-28 ASSESSMENT — FIBROSIS 4 INDEX: FIB4 SCORE: 0.49

## 2023-08-28 NOTE — PROGRESS NOTES
Monitor Summary:   Rhythm: sr-st  Rate:   Measurement: .14/.07/.33  Ectopy: pvc's, bigem

## 2023-08-28 NOTE — PROGRESS NOTES
NOC HOSPITALIST CROSS COVER    Notified by RN regarding patient being somewhat lethargic, having coarse lung sounds, and being slightly difficult to arouse. The patient was examined at bedside. At the time of my arrival, she was awake, answering questions, but still lethargic. She was complaining of shortness of breath. RT was at bedside and performed NT suction with pink, frothy sputum noted. Lung sounds were remarkable for rales throughout. She is mildly tachypneic. Her abdomen is distended, soft, and minimally tender to palpation.       Vitals:    08/28/23 0355   BP: (!) 152/93   Pulse: 60   Resp: 18   Temp:    SpO2: 95%        Plan:  #Shortness of breath  -CXR demonstrating bilat pleural effusions  -Lasix 40 mg IV once STAT  -Titrate oxygen to maintain SpO2 > 92%  -Encourage incentive spirometry if patient able   -NT suction as needed to assist with secretion clearance    -----------------------------------------------------------------------------------------------------------    Electronically signed by:  Ananya Person, MCKAYLA, CHANG, TANYA-BC  Hospitalist Services

## 2023-08-28 NOTE — CARE PLAN
The patient is Stable - Low risk of patient condition declining or worsening    Shift Goals  Clinical Goals: improve orientation, IR for ken  Patient Goals: KUN  Family Goals: updates    Progress made toward(s) clinical / shift goals: Pt began to speak and follow commands, IR drain placed    Patient is not progressing towards the following goals:      Problem: Hemodynamics - still edematous  Goal: Patient's hemodynamics, fluid balance and neurologic status will be stable or improve  Outcome: Not Progressing     Problem: Bowel Elimination - still has watery diarrhea  Goal: Establish and maintain regular bowel function  Outcome: Not Progressing     Problem: Mobility - very weak all extremities  Goal: Patient's capacity to carry out activities will improve  Outcome: Not Progressing

## 2023-08-28 NOTE — CONSULTS
"INFECTIOUS DISEASES INPATIENT CONSULT NOTE     Date of Service:8/28/2023    Consult Requested By: Keven Brunner M.D.    Reason for Consultation: cdiff colitis  GB infection    History of Present Illness:   Odalys Keenan is a 67 y.o. female admitted 8/25/2023 for above as a transfer  Initially presented to [facility] for suicidal ideation, alcohol withdrawal, nausea, vomiting, left-sided abdominal pain and encephalopathy. C. difficile positive on 8/17.+ bloody streaks of stools.  Inclear if had diarrhea  CT scan of the abdomen+pancolitis.  She was started on IV Flagyl and p.o. Vanco.  GI was consulted and recommended against steroids or Biologics since she has a severe C. difficile infection.  HIDA scan showed chronic cholecystitis and a cholecystostomy tube was recommended. Hosp course complicated by A-fib with RVR Currently on oral vanco, IV Flagyl, and Zosyn  Infectious Diseases consulted for antibiotic recommendation and management      Review Of Systems:  Limited by encephalopathy  All other systems are reviewed and are negative     PMH:   Past Medical History:   Diagnosis Date    Crohn disease (HCC)     HLD (hyperlipidemia)     Hypertension          PSH:  No past surgical history on file.    FAMILY HX:  AMS as above    SOCIAL HX:  Social History     Socioeconomic History    Marital status: Single     Spouse name: Not on file    Number of children: Not on file    Years of education: Not on file    Highest education level: Not on file   Occupational History    Not on file   Tobacco Use    Smoking status: Some Days     Types: Cigarettes    Smokeless tobacco: Never   Vaping Use    Vaping Use: Never used   Substance and Sexual Activity    Alcohol use: Yes     Comment: \"quite a bit\"    Drug use: Not Currently    Sexual activity: Not on file   Other Topics Concern    Not on file   Social History Narrative    Not on file     Social Determinants of Health     Financial Resource Strain: Not on file   Food " "Insecurity: No Food Insecurity (8/18/2023)    Hunger Vital Sign     Worried About Running Out of Food in the Last Year: Never true     Ran Out of Food in the Last Year: Never true   Transportation Needs: No Transportation Needs (8/18/2023)    PRAPARE - Transportation     Lack of Transportation (Medical): No     Lack of Transportation (Non-Medical): No   Physical Activity: Not on file   Stress: Not on file   Social Connections: Not on file   Intimate Partner Violence: Not on file   Housing Stability: Not on file     Social History     Tobacco Use   Smoking Status Some Days    Types: Cigarettes   Smokeless Tobacco Never     Social History     Substance and Sexual Activity   Alcohol Use Yes    Comment: \"quite a bit\"       Allergies/Intolerances:  Allergies   Allergen Reactions    Nsaids Vomiting         Other Current Medications:    Current Facility-Administered Medications:     cefepime (Maxipime) 2 g in  mL IVPB, 2 g, Intravenous, Q12HR, Roxanna Day M.D., Stopped at 08/28/23 1130    magnesium sulfate IVPB premix 2 g, 2 g, Intravenous, Once, Keven Brunner M.D.    thiamine (Vitamin B-1) tablet 100 mg, 100 mg, Enteral Tube, DAILY, Keven Brunner M.D.    folic acid (Folvite) tablet 1 mg, 1 mg, Enteral Tube, DAILY, Keven Brunner M.D.    lidocaine (Lidoderm) 5 % 1 Patch, 1 Patch, Transdermal, Q24HR, Tutu Rodriguez D.O., 1 Patch at 08/28/23 0355    diclofenac sodium (Voltaren) 1 % gel 2 g, 2 g, Topical, 4X/DAY PRN, JAYA FlahertyO., 2 g at 08/28/23 0928    insulin regular (HumuLIN R,NovoLIN R) injection, 2-9 Units, Subcutaneous, 4X/DAY ACHS, 5 Units at 08/26/23 1127 **AND** POC blood glucose manual result, , , Q AC AND BEDTIME(S) **AND** NOTIFY MD and PharmD, , , Once **AND** Administer 20 grams of glucose (approximately 8 ounces of fruit juice) every 15 minutes PRN FSBG less than 70 mg/dL, , , PRN **AND** dextrose 50% (D50W) injection 25 g, 25 g, Intravenous, Q15 MIN PRN, Esha Lang M.D.    " "ondansetron (Zofran) syringe/vial injection 4 mg, 4 mg, Intravenous, Q4HRS PRN, Esha Lang M.D.    metoprolol tartrate (Lopressor) tablet 12.5 mg, 12.5 mg, Enteral Tube, TWICE DAILY, Esha Lang M.D., 12.5 mg at 23 0520    Pharmacy Consult: Enteral tube insertion - review meds/change route/product selection, 1 Each, Other, PHARMACY TO DOSE, Esha Lang M.D.    levETIRAcetam (Keppra) injection 500 mg, 500 mg, Intravenous, Q12HRS, Esha Lang M.D., 500 mg at 23 0520    vancomycin 50 mg/mL oral soln 500 mg, 500 mg, Enteral Tube, Q6HRS, 500 mg at 23 1104 **AND** metroNIDAZOLE (Flagyl) IVPB 500 mg, 500 mg, Intravenous, Q8HRS, Esha Lang M.D., Stopped at 23 0655    acetaminophen (Tylenol) tablet 650 mg, 650 mg, Enteral Tube, Q6HRS PRN, Esha Lang M.D., 650 mg at 23 0355    ondansetron (Zofran ODT) dispertab 4 mg, 4 mg, Enteral Tube, Q4HRS PRN, Esha Lang M.D.    levalbuterol (Xopenex) 1.25 MG/3ML nebulizer solution 1.25 mg, 1.25 mg, Nebulization, Q4H PRN (RT), Esha Lang M.D., 1.25 mg at 23      Most Recent Vital Signs:  BP (!) 141/80   Pulse (!) 104   Temp 36.4 °C (97.6 °F) (Temporal)   Resp 18   Ht 1.727 m (5' 7.99\")   Wt 78.7 kg (173 lb 8 oz)   SpO2 95%   BMI 26.39 kg/m²   Temp  Av.6 °C (97.8 °F)  Min: 36.4 °C (97.5 °F)  Max: 36.7 °C (98 °F)    Physical Exam:  General: chronically ill no acute distress  HEENT: Sclera anicteric, PERRL, EOMI  Neck: supple, no JVD  Chest: CTAB, unlabored.  Cardiac: RRR  Abdomen: + bowel sounds, soft non-distended  Drain  Extremities: +ecchymosis edema  Skin: no rashes   Neuro: Awake, Speech CN intact DENT  Psych: Affect flat    Pertinent Lab Results:  Recent Labs     23  0030 23  0405   WBC 28.6* 21.0* 15.3*      Recent Labs     23  0405   HEMOGLOBIN 12.2 11.6* 10.2* 9.8*   HEMATOCRIT 35.1* 33.7* 29.6* 29.2*   MCV 91.9 91.8 92.5 97.0   MCH " 31.9 31.6 31.9 32.6   MACROCYTOSIS 1+ 1+  --   --    ANISOCYTOSIS 1+ 1+  --   --    PLATELETCT 412 460* 561* 684*         Recent Labs     08/27/23  0030 08/28/23  0405 08/28/23  0743   SODIUM 141 138 138   POTASSIUM 3.6 3.4* 3.3*   CHLORIDE 110 109 106   CO2 20 20 22   CREATININE 0.54 0.49* 0.52        Recent Labs     08/26/23  0339 08/27/23  0030 08/28/23  0405   ALBUMIN 2.6* 2.6* 2.8*        Pertinent Micro:  Results       Procedure Component Value Units Date/Time    FLUID CULTURE W/GRAM STAIN [[id number]] Collected: 08/27/23 1600    Order Status: Completed Specimen: Body Fluid Updated: 08/28/23 0936     Significant Indicator NEG     Source BF     Site Galbladder drain     Culture Result No growth at 24 hours.     Gram Stain Result Rare Gram positive rods.    GRAM STAIN [[id number]] Collected: 08/27/23 1600    Order Status: Completed Specimen: Body Fluid Updated: 08/27/23 1941     Significant Indicator .     Source BF     Site Galbladder drain     Gram Stain Result Rare Gram positive rods.    FLUID CULTURE W/GRAM STAIN [[id number]]     Order Status: No result Specimen: Other Body Fluid     Cdiff By PCR Rflx Toxin [[id number]] Collected: 08/26/23 0045    Order Status: Completed Updated: 08/26/23 0713     C Diff by PCR Negative     Comment: C. difficile NOT detected by PCR.    Acute diarrhea Special Contact Precautions is required  until 48 hours after diarrhea has resolved, patient’s stool  has returned to baseline or until an infectious agent is  no longer suspected.  Treatment not indicated per guidelines.  Repeat testing not indicated within 7 days.          027-NAP1-BI Presumptive Negative     Comment: Presumptive 027/NAP1/BI target DNA sequences are NOT DETECTED.       FLUID CULTURE W/GRAM STAIN [[id number]]     Order Status: Canceled Specimen: Body Fluid from Thoracentesis Fluid     AFB CULTURE [[id number]]     Order Status: Canceled Specimen: Body Fluid from Thoracentesis Fluid     FUNGAL CULTURE [[id number]]      Order Status: Canceled Specimen: Body Fluid from Thoracentesis Fluid     Blood Culture,Hold [[id number]] Collected: 08/26/23 0110    Order Status: Completed Updated: 08/26/23 0250     Blood Culture Hold Collected    URINALYSIS [[id number]]  (Abnormal) Collected: 08/26/23 0058    Order Status: Completed Updated: 08/26/23 0220     Color DK Yellow     Character Clear     Specific Gravity 1.045     Ph 5.0     Glucose 500 mg/dL      Ketones Trace mg/dL      Protein 100 mg/dL      Bilirubin Small     Urobilinogen, Urine 0.2     Nitrite Positive     Leukocyte Esterase Trace     Occult Blood Trace     Micro Urine Req Microscopic    Blood Culture,Hold [[id number]] Collected: 08/25/23 2122    Order Status: Completed Updated: 08/26/23 0122     Blood Culture Hold Collected    Urinalysis [[id number]]     Order Status: No result Specimen: Urine     Culture Respiratory W/ GRM STN [[id number]]     Order Status: Completed Specimen: Respirate from Sputum     C Diff by PCR rflx Toxin [[id number]]     Order Status: Canceled Specimen: Stool           Blood Culture   Date Value Ref Range Status   08/17/2023 No growth after 5 days of incubation.  Final     Blood Culture Hold   Date Value Ref Range Status   08/26/2023 Collected  Final        Studies:  CT 8/22  IMPRESSION:   1.  Findings are most compatible with a diffuse colitis which appears similar to prior study. Given the appearance and distribution, most likely infectious.  2.  Small amount of free fluid in the pelvis.  3.  Small bilateral pleural effusions with associated bilateral areas of collapsed right greater than left. This appears fairly similar to prior study.  IMPRESSION:   Cholecystitis  -s/p drain placement 8/27  C diff colitis-positive 8/17 at OSH  -Cdiff done here neg  Leukocytosis, resolving  Crohns disease  Recent suicidal ideation  Thrombocytosis  ETOH abuse  Cirrhosis  Withdrawal seizure  Pancreatitis    PLAN:   Has completed 10 days course treatment for Cdiff colitis  Can  change to prophylactic dosing as needing antibiotics for cholecystitis  Continue flagyl  Change Zosyn to cefepime  Continue for 5-7 days from drain placement  FU cultures    Plan of care discussed with MAMADOU Brunner M.D.. Will continue to follow    Roxanna Day M.D.

## 2023-08-28 NOTE — PROGRESS NOTES
Hospital Medicine Daily Progress Note    Date of Service  8/28/2023    Chief Complaint  Odalys Keenan is a 67 y.o. female admitted 8/25/2023 with cholecystitis    Hospital Course  Admitted as a transfer for cholecystitis.  Patient was initially admitted for alcohol withdrawal delirium, she was noted to have pancolitis on CT scan, noted to be positive for C. difficile.  She was started on oral vancomycin and IV Flagyl.  She also has known history of Crohn's disease, and was not on any medication.  She had an episode of atrial fibrillation with rapid regular rate and converted back to sinus rhythm with Cardizem.  There was suspicion for cholecystitis on CT scan and ultrasound right upper quadrant.  HIDA scan is consistent with chronic cholecystitis.  She was then transferred here.  She was on antibiotics the form of IV Zosyn, oral vancomycin, and IV Flagyl.  Interventional radiology was consulted on the case and patient underwent CT guided gallbladder drainage on  8/27/2023    Interval Problem Update  Cholecystitis - drain in place  Colitis - some pain  A-fib - currently sinus  Alc - no current signs of withdrawal  Encephalopathy - more alert and oriented today  Low potassium and magnesium    I have discussed this patient's plan of care and discharge plan at IDT rounds today with Case Management, Nursing, Nursing leadership, and other members of the IDT team.    Consultants/Specialty  GI, infectious disease, and interventional radiology    Code Status  Full Code    Disposition  The patient is not medically cleared for discharge to home or a post-acute facility.  Anticipate discharge to: a short-term general hospital for inpatient care    I have placed the appropriate orders for post-discharge needs.    Review of Systems  Review of Systems   Constitutional:  Positive for malaise/fatigue. Negative for chills, diaphoresis and fever.   HENT:  Negative for congestion, hearing loss and sore throat.    Eyes:  Negative  for blurred vision.   Respiratory:  Negative for cough, shortness of breath and wheezing.    Cardiovascular:  Negative for chest pain, palpitations and leg swelling.   Gastrointestinal:  Positive for abdominal pain. Negative for diarrhea, heartburn, nausea and vomiting.   Genitourinary:  Negative for dysuria, flank pain and hematuria.   Musculoskeletal:  Positive for back pain, joint pain and myalgias. Negative for neck pain.   Skin:  Negative for rash.   Neurological:  Positive for weakness. Negative for dizziness, sensory change, speech change, focal weakness and headaches.   Psychiatric/Behavioral:  The patient is nervous/anxious.         Physical Exam  Temp:  [36.4 °C (97.5 °F)-36.7 °C (98 °F)] 36.4 °C (97.6 °F)  Pulse:  [] 104  Resp:  [18-32] 18  BP: (133-152)/(74-93) 141/80  SpO2:  [93 %-96 %] 95 %    Physical Exam  Vitals and nursing note reviewed.   HENT:      Head: Normocephalic and atraumatic.      Nose: No congestion.      Mouth/Throat:      Mouth: Mucous membranes are dry.   Eyes:      Extraocular Movements: Extraocular movements intact.      Conjunctiva/sclera: Conjunctivae normal.   Cardiovascular:      Rate and Rhythm: Regular rhythm. Tachycardia present.      Heart sounds: No murmur heard.  Abdominal:      General: There is distension.      Tenderness: There is abdominal tenderness. There is no guarding or rebound.      Comments: Drain at RUQ   Musculoskeletal:      Cervical back: No tenderness.      Right lower leg: Edema present.      Left lower leg: Edema present.   Skin:     General: Skin is warm and dry.   Neurological:      General: No focal deficit present.      Mental Status: She is alert.      Cranial Nerves: No cranial nerve deficit.      Comments: Oriented to person and place   Psychiatric:         Mood and Affect: Mood is anxious.         Speech: Speech is delayed and tangential.         Cognition and Memory: She exhibits impaired recent memory.         Fluids    Intake/Output  Summary (Last 24 hours) at 8/28/2023 1158  Last data filed at 8/28/2023 0921  Gross per 24 hour   Intake 660 ml   Output 4995 ml   Net -4335 ml       Laboratory  Recent Labs     08/26/23  0339 08/27/23  0030 08/28/23  0405   WBC 28.6* 21.0* 15.3*   RBC 3.67* 3.20* 3.01*   HEMOGLOBIN 11.6* 10.2* 9.8*   HEMATOCRIT 33.7* 29.6* 29.2*   MCV 91.8 92.5 97.0   MCH 31.6 31.9 32.6   MCHC 34.4 34.5 33.6   RDW 48.2 48.9 52.5*   PLATELETCT 460* 561* 684*   MPV 11.8 11.1 10.8     Recent Labs     08/27/23  0030 08/28/23  0405 08/28/23  0743   SODIUM 141 138 138   POTASSIUM 3.6 3.4* 3.3*   CHLORIDE 110 109 106   CO2 20 20 22   GLUCOSE 128* 125* 141*   BUN 14 11 11   CREATININE 0.54 0.49* 0.52   CALCIUM 8.2* 8.0* 7.8*     Recent Labs     08/25/23  2315   APTT 23.9*   INR 0.89               Imaging  DX-CHEST-LIMITED (1 VIEW)   Final Result         1.  Pulmonary edema and/or infiltrates      DX-ABDOMEN FOR TUBE PLACEMENT   Final Result         1.  Nonspecific bowel gas pattern in the upper abdomen.   2.  Nasogastric tube tip terminates overlying the expected location of the gastric body.   3.  Bilateral pulmonary edema and/or infiltrates      CT-DRAIN-CHOLECTYSTOSTOMY   Final Result      1. CT guided gallbladder Drainage.      2. Gallbladder drain should remain in place for approximately 4 weeks and a tractogram should be performed before removal.      US-CHEST   Final Result      Minimal left pleural fluid is identified.      DX-CHEST-LIMITED (1 VIEW)   Final Result         1.  Pulmonary edema and/or infiltrates, similar compared to prior study.   2.  Trace bilateral pleural effusions           Assessment/Plan  * C. difficile colitis- (present on admission)  Assessment & Plan  Oral Vancomycin and IV Flagyl  Consult ID    Acute encephalopathy- (present on admission)  Assessment & Plan  Multifactorial  Avoid Benzodiazepines and Anticholinergics  Frequent orientation  Open window blinds during the day  Avoid naps during the day  Family  at bedside whenever possible  Ensure adequate sleep at night - use Melatonin preferably  Avoid early morning labs  Avoid vital signs during sleep  Ambulate if possible  Provide adequate pain control  Monitor for urinary retention  Prevent and manage constipation  Discontinue IVs, Catheters, Tubes, Lines, Cardiac monitors, SCDs if possible    Cholecystitis- (present on admission)  Assessment & Plan  CT guided gallbladder drainage   8/27/2023  IV Cefepime  Consult ID  Follow fluid culture    Alcohol withdrawal delirium (HCC)- (present on admission)  Assessment & Plan  Off CIWA  Start Thiamine + Folic  Check vit b12     Hypomagnesemia- (present on admission)  Assessment & Plan  IV Mg 2 g  Follow level    Hypokalemia- (present on admission)  Assessment & Plan  IV KCl  Follow cmp    Elevated TSH- (present on admission)  Assessment & Plan  FT4 and FT3 wnl  Check TSH, FT4 and FT3 on 11/1/2023    Paroxysmal atrial fibrillation (HCC)- (present on admission)  Assessment & Plan  Metoprolol    Crohn's disease (HCC)- (present on admission)  Assessment & Plan  Consult Gastroenterology    Hyperglycemia- (present on admission)  Assessment & Plan  hgba1c 5.2    Pleural effusion- (present on admission)  Assessment & Plan  monitor    Respiratory alkalosis- (present on admission)  Assessment & Plan  monitor    Acute respiratory failure with hypoxia (HCC)- (present on admission)  Assessment & Plan  RT protocol    Seizures (HCC)- (present on admission)  Assessment & Plan  IV Keppra    Dyslipidemia- (present on admission)  Assessment & Plan  Hold Crestor         VTE prophylaxis:   SCDs/TEDs      I have performed a physical exam and reviewed and updated ROS and Plan today (8/28/2023). In review of yesterday's note (8/27/2023), there are no changes except as documented above.

## 2023-08-28 NOTE — CARE PLAN
The patient is Watcher - Medium risk of patient condition declining or worsening    Shift Goals  Clinical Goals: Hemodynamics and safety  Patient Goals: KUN  Family Goals: updates    Progress made toward(s) clinical / shift goals:  Patient stable throughout shift

## 2023-08-28 NOTE — PROGRESS NOTES
Assumed care of patient after receiving report from Wenceslao night shift RN. Patient is currently Alert and Oriented x1 on 4L NC. Bed locked and in lowest position. Call light within reach.

## 2023-08-28 NOTE — PROGRESS NOTES
Radiology Progress Note   Author: WESTON Martinez Date & Time created: 8/28/2023  9:48 AM   Date of admission  8/25/2023  Note to reader: this note follows the APSO format rather than the historical SOAP format. Assessment and plan located at the top of the note for ease of use.    Chief Complaint  67 y.o. female admitted 8/25/2023 with abdominal pain and altered mentation.      HPI  67 y.o. female with a history of alcoholic liver disease, Crohn's disease (previously on immune modulators), and seizures transferred from [facility] in [place] on 08/25/2023 for suicidal ideations, left-sided abdominal pain,  nausea/vomiting, and alcohol withdrawal.  CT abdomen pelvis showed diffuse colitis and distended gallbladder.  Nuc med hepatobiliary scan was unable to visualize gallbladder.  Patient underwent a CT-guided cholecystostomy drain placement with IR Dr. Jean Baptiste on 08/27/2023 for suspected chronic cholecystitis.     Interval history:  08/28/23 - Noreen drain with 120 mL biliary output in the last 24 hours.  WBC 15.3 down from 21.0.  T. bili normal; alk phos trending down.    Active Problems:    Seizures (HCC)    Dyslipidemia    Alcohol withdrawal delirium (HCC)    Atrial fibrillation with RVR (HCC)    Cholecystitis    Acute respiratory failure with hypoxia (HCC)    Respiratory alkalosis    Pleural effusion    C. difficile colitis    Acute encephalopathy    Hyperglycemia    Sepsis (HCC)      Plan IR  - Irrigate Jessica drain with 10 ml of sterile saline q shift; do not aspirate back  - Before catheter removal, a cholecystogram with cholangiogram would be recommended to assure patency of the common duct. Likewise, a tracto-gram may also be helpful to make sure that the catheter tract has matured so that there is not intraperitoneal bile leakage after catheter removal.  - Drain should remain in place for 6 weeks to allow for tract maturity.       -Thank you for allowing Interventional Radiology team  to participate in the patients care, if any additional care or requests are needed in the future please do not hesitate call or place IR order   [phone]                 Review of Systems  Physical Exam   Review of Systems   Constitutional:  Negative for chills and fever.   Respiratory:  Negative for shortness of breath.    Cardiovascular:  Negative for chest pain and palpitations.   Gastrointestinal:  Positive for abdominal pain. Negative for nausea and vomiting.   Neurological:  Negative for weakness and headaches.      Vitals:    08/28/23 0713   BP: (!) 141/80   Pulse: (!) 104   Resp: 18   Temp: 36.4 °C (97.6 °F)   SpO2: 95%        Physical Exam  Constitutional:       Appearance: She is ill-appearing.   HENT:      Nose:      Comments: NG tube  Cardiovascular:      Rate and Rhythm: Normal rate.   Abdominal:      General: There is distension.      Tenderness: There is abdominal tenderness.      Comments: Jessica drain RUQ   Skin:     General: Skin is warm and dry.   Neurological:      General: No focal deficit present.      Mental Status: She is lethargic and confused.   Psychiatric:         Mood and Affect: Mood normal.         Behavior: Behavior normal.             Labs    Recent Labs     08/26/23  0339 08/27/23  0030 08/28/23  0405   WBC 28.6* 21.0* 15.3*   RBC 3.67* 3.20* 3.01*   HEMOGLOBIN 11.6* 10.2* 9.8*   HEMATOCRIT 33.7* 29.6* 29.2*   MCV 91.8 92.5 97.0   MCH 31.6 31.9 32.6   MCHC 34.4 34.5 33.6   RDW 48.2 48.9 52.5*   PLATELETCT 460* 561* 684*   MPV 11.8 11.1 10.8     Recent Labs     08/27/23  0030 08/28/23  0405 08/28/23  0743   SODIUM 141 138 138   POTASSIUM 3.6 3.4* 3.3*   CHLORIDE 110 109 106   CO2 20 20 22   GLUCOSE 128* 125* 141*   BUN 14 11 11   CREATININE 0.54 0.49* 0.52   CALCIUM 8.2* 8.0* 7.8*     Recent Labs     08/26/23  0339 08/27/23  0030 08/28/23  0405 08/28/23  0743   ALBUMIN 2.6* 2.6* 2.8*  --    TBILIRUBIN 0.4 0.4 0.4  --    ALKPHOSPHAT 918* 789* 519*  --    TOTPROTEIN 5.1* 5.0* 5.0*  --   "  ALTSGPT 35 30 22  --    ASTSGOT 35 33 24  --    CREATININE 0.52 0.54 0.49* 0.52     DX-CHEST-LIMITED (1 VIEW)   Final Result         1.  Pulmonary edema and/or infiltrates      DX-ABDOMEN FOR TUBE PLACEMENT   Final Result         1.  Nonspecific bowel gas pattern in the upper abdomen.   2.  Nasogastric tube tip terminates overlying the expected location of the gastric body.   3.  Bilateral pulmonary edema and/or infiltrates      CT-DRAIN-CHOLECTYSTOSTOMY   Final Result      1. CT guided gallbladder Drainage.      2. Gallbladder drain should remain in place for approximately 4 weeks and a tractogram should be performed before removal.      US-CHEST   Final Result      Minimal left pleural fluid is identified.      DX-CHEST-LIMITED (1 VIEW)   Final Result         1.  Pulmonary edema and/or infiltrates, similar compared to prior study.   2.  Trace bilateral pleural effusions          INR   Date Value Ref Range Status   08/25/2023 0.89 0.87 - 1.13 Final     Comment:     INR - Non-therapeutic Reference Range: 0.87-1.13  INR - Therapeutic Reference Range: 2.0-4.0       No results found for: \"POCINR\"     Intake/Output Summary (Last 24 hours) at 8/28/2023 0948  Last data filed at 8/28/2023 0921  Gross per 24 hour   Intake 660 ml   Output 4995 ml   Net -4335 ml      Labs not explicitly included in this progress note were reviewed by the author. Radiology/imaging not explicitly included in this progress note was reviewed by the author.     I have performed a physical exam and reviewed and updated ROS and Plan today (8/28/2023).     45 minutes in directly providing and coordinating care and extensive data review.  No time overlap and excludes procedures.   "

## 2023-08-28 NOTE — CARE PLAN
Problem: Knowledge Deficit - Standard  Goal: Patient and family/care givers will demonstrate understanding of plan of care, disease process/condition, diagnostic tests and medications  Outcome: Progressing     Problem: Hemodynamics  Goal: Patient's hemodynamics, fluid balance and neurologic status will be stable or improve  Outcome: Progressing     Problem: Fluid Volume  Goal: Fluid volume balance will be maintained  Outcome: Progressing     Problem: Skin Integrity  Goal: Skin integrity is maintained or improved  Outcome: Progressing     Problem: Fall Risk  Goal: Patient will remain free from falls  Outcome: Progressing   The patient is Watcher - Medium risk of patient condition declining or worsening    Shift Goals  Clinical Goals: monitor lab values, telemetry  Patient Goals: KUN  Family Goals: updates    Progress made toward(s) clinical / shift goals:  Progressing    Patient is not progressing towards the following goals:

## 2023-08-29 ENCOUNTER — PATIENT OUTREACH (OUTPATIENT)
Dept: SCHEDULING | Facility: IMAGING CENTER | Age: 68
End: 2023-08-29
Payer: MEDICARE

## 2023-08-29 VITALS
WEIGHT: 166.23 LBS | RESPIRATION RATE: 19 BRPM | TEMPERATURE: 98.4 F | OXYGEN SATURATION: 94 % | HEART RATE: 110 BPM | HEIGHT: 68 IN | SYSTOLIC BLOOD PRESSURE: 141 MMHG | DIASTOLIC BLOOD PRESSURE: 75 MMHG | BODY MASS INDEX: 25.19 KG/M2

## 2023-08-29 PROBLEM — K81.0 ACUTE CHOLECYSTITIS: Status: ACTIVE | Noted: 2023-08-25

## 2023-08-29 PROBLEM — R13.10 DYSPHAGIA: Status: ACTIVE | Noted: 2023-08-29

## 2023-08-29 LAB
ALBUMIN SERPL BCP-MCNC: 2.7 G/DL (ref 3.2–4.9)
ALBUMIN/GLOB SERPL: 1.2 G/DL
ALP SERPL-CCNC: 365 U/L (ref 30–99)
ALT SERPL-CCNC: 16 U/L (ref 2–50)
ANION GAP SERPL CALC-SCNC: 8 MMOL/L (ref 7–16)
AST SERPL-CCNC: 19 U/L (ref 12–45)
BILIRUB SERPL-MCNC: 0.3 MG/DL (ref 0.1–1.5)
BUN SERPL-MCNC: 10 MG/DL (ref 8–22)
CALCIUM ALBUM COR SERPL-MCNC: 8.8 MG/DL (ref 8.5–10.5)
CALCIUM SERPL-MCNC: 7.8 MG/DL (ref 8.5–10.5)
CHLORIDE SERPL-SCNC: 106 MMOL/L (ref 96–112)
CO2 SERPL-SCNC: 23 MMOL/L (ref 20–33)
CREAT SERPL-MCNC: 0.45 MG/DL (ref 0.5–1.4)
ERYTHROCYTE [DISTWIDTH] IN BLOOD BY AUTOMATED COUNT: 54.6 FL (ref 35.9–50)
GFR SERPLBLD CREATININE-BSD FMLA CKD-EPI: 106 ML/MIN/1.73 M 2
GLOBULIN SER CALC-MCNC: 2.2 G/DL (ref 1.9–3.5)
GLUCOSE BLD STRIP.AUTO-MCNC: 127 MG/DL (ref 65–99)
GLUCOSE BLD STRIP.AUTO-MCNC: 137 MG/DL (ref 65–99)
GLUCOSE SERPL-MCNC: 141 MG/DL (ref 65–99)
HCT VFR BLD AUTO: 28.6 % (ref 37–47)
HGB BLD-MCNC: 9.3 G/DL (ref 12–16)
MAGNESIUM SERPL-MCNC: 2 MG/DL (ref 1.5–2.5)
MCH RBC QN AUTO: 32.4 PG (ref 27–33)
MCHC RBC AUTO-ENTMCNC: 32.5 G/DL (ref 32.2–35.5)
MCV RBC AUTO: 99.7 FL (ref 81.4–97.8)
PHOSPHATE SERPL-MCNC: 3.2 MG/DL (ref 2.5–4.5)
PLATELET # BLD AUTO: 768 K/UL (ref 164–446)
PMV BLD AUTO: 10.7 FL (ref 9–12.9)
POTASSIUM SERPL-SCNC: 3.9 MMOL/L (ref 3.6–5.5)
PROT SERPL-MCNC: 4.9 G/DL (ref 6–8.2)
RBC # BLD AUTO: 2.87 M/UL (ref 4.2–5.4)
SODIUM SERPL-SCNC: 137 MMOL/L (ref 135–145)
WBC # BLD AUTO: 16.1 K/UL (ref 4.8–10.8)

## 2023-08-29 PROCEDURE — 700101 HCHG RX REV CODE 250: Performed by: HOSPITALIST

## 2023-08-29 PROCEDURE — 700111 HCHG RX REV CODE 636 W/ 250 OVERRIDE (IP): Performed by: HOSPITALIST

## 2023-08-29 PROCEDURE — 700102 HCHG RX REV CODE 250 W/ 637 OVERRIDE(OP): Performed by: HOSPITALIST

## 2023-08-29 PROCEDURE — 80053 COMPREHEN METABOLIC PANEL: CPT

## 2023-08-29 PROCEDURE — 99232 SBSQ HOSP IP/OBS MODERATE 35: CPT | Performed by: INTERNAL MEDICINE

## 2023-08-29 PROCEDURE — 99222 1ST HOSP IP/OBS MODERATE 55: CPT | Performed by: NURSE PRACTITIONER

## 2023-08-29 PROCEDURE — A9270 NON-COVERED ITEM OR SERVICE: HCPCS | Performed by: FAMILY MEDICINE

## 2023-08-29 PROCEDURE — A9270 NON-COVERED ITEM OR SERVICE: HCPCS | Performed by: HOSPITALIST

## 2023-08-29 PROCEDURE — 82962 GLUCOSE BLOOD TEST: CPT

## 2023-08-29 PROCEDURE — 84100 ASSAY OF PHOSPHORUS: CPT

## 2023-08-29 PROCEDURE — 700102 HCHG RX REV CODE 250 W/ 637 OVERRIDE(OP): Performed by: FAMILY MEDICINE

## 2023-08-29 PROCEDURE — 700111 HCHG RX REV CODE 636 W/ 250 OVERRIDE (IP): Mod: JZ | Performed by: INTERNAL MEDICINE

## 2023-08-29 PROCEDURE — 83735 ASSAY OF MAGNESIUM: CPT

## 2023-08-29 PROCEDURE — 700105 HCHG RX REV CODE 258: Performed by: INTERNAL MEDICINE

## 2023-08-29 PROCEDURE — 85027 COMPLETE CBC AUTOMATED: CPT

## 2023-08-29 PROCEDURE — 99239 HOSP IP/OBS DSCHRG MGMT >30: CPT | Performed by: FAMILY MEDICINE

## 2023-08-29 RX ORDER — LANOLIN ALCOHOL/MO/W.PET/CERES
100 CREAM (GRAM) TOPICAL DAILY
Qty: 30 TABLET | Status: ON HOLD
Start: 2023-08-30 | End: 2023-09-06

## 2023-08-29 RX ORDER — OXYCODONE HYDROCHLORIDE 5 MG/1
5-10 TABLET ORAL EVERY 4 HOURS PRN
Qty: 30 TABLET | Refills: 0 | Status: ON HOLD
Start: 2023-08-29 | End: 2023-09-06

## 2023-08-29 RX ORDER — ONDANSETRON 2 MG/ML
4 INJECTION INTRAMUSCULAR; INTRAVENOUS EVERY 4 HOURS PRN
Qty: 84 ML | Status: ON HOLD
Start: 2023-08-29 | End: 2023-09-06

## 2023-08-29 RX ORDER — METRONIDAZOLE 500 MG/100ML
500 INJECTION, SOLUTION INTRAVENOUS EVERY 8 HOURS
Status: DISCONTINUED | OUTPATIENT
Start: 2023-08-29 | End: 2023-08-29 | Stop reason: HOSPADM

## 2023-08-29 RX ORDER — OXYCODONE HYDROCHLORIDE 5 MG/1
5-10 TABLET ORAL EVERY 4 HOURS PRN
Status: DISCONTINUED | OUTPATIENT
Start: 2023-08-29 | End: 2023-08-29 | Stop reason: HOSPADM

## 2023-08-29 RX ORDER — DEXTROSE MONOHYDRATE 25 G/50ML
25 INJECTION, SOLUTION INTRAVENOUS PRN
Qty: 5 ML | Refills: 0 | Status: ON HOLD
Start: 2023-08-29 | End: 2023-09-06

## 2023-08-29 RX ORDER — ACETAMINOPHEN 325 MG/1
650 TABLET ORAL EVERY 6 HOURS PRN
Qty: 30 TABLET | Refills: 0 | Status: ON HOLD
Start: 2023-08-29 | End: 2023-09-06

## 2023-08-29 RX ORDER — LEVETIRACETAM 500 MG/5ML
500 INJECTION, SOLUTION, CONCENTRATE INTRAVENOUS EVERY 12 HOURS
Qty: 300 ML | Status: ON HOLD
Start: 2023-08-29 | End: 2023-09-06

## 2023-08-29 RX ORDER — METRONIDAZOLE 500 MG/100ML
500 INJECTION, SOLUTION INTRAVENOUS EVERY 8 HOURS
Qty: 1200 ML | Refills: 0 | Status: ON HOLD | DISCHARGE
Start: 2023-08-29 | End: 2023-09-06

## 2023-08-29 RX ADMIN — LEVETIRACETAM 500 MG: 100 INJECTION, SOLUTION, CONCENTRATE INTRAVENOUS at 17:28

## 2023-08-29 RX ADMIN — Medication 100 MG: at 05:04

## 2023-08-29 RX ADMIN — CEFEPIME 2 G: 2 INJECTION, POWDER, FOR SOLUTION INTRAVENOUS at 11:12

## 2023-08-29 RX ADMIN — LIDOCAINE PATCH 5% 1 PATCH: 700 PATCH TOPICAL at 05:04

## 2023-08-29 RX ADMIN — OXYCODONE HYDROCHLORIDE 10 MG: 5 TABLET ORAL at 17:28

## 2023-08-29 RX ADMIN — DICLOFENAC SODIUM 2 G: 10 GEL TOPICAL at 09:09

## 2023-08-29 RX ADMIN — METOPROLOL TARTRATE 12.5 MG: 25 TABLET, FILM COATED ORAL at 05:04

## 2023-08-29 RX ADMIN — METOPROLOL TARTRATE 12.5 MG: 25 TABLET, FILM COATED ORAL at 17:28

## 2023-08-29 RX ADMIN — METRONIDAZOLE 500 MG: 5 INJECTION, SOLUTION INTRAVENOUS at 05:15

## 2023-08-29 RX ADMIN — VANCOMYCIN HYDROCHLORIDE 500 MG: 5 INJECTION, POWDER, LYOPHILIZED, FOR SOLUTION INTRAVENOUS at 05:05

## 2023-08-29 RX ADMIN — METRONIDAZOLE 500 MG: 500 INJECTION, SOLUTION INTRAVENOUS at 14:36

## 2023-08-29 RX ADMIN — VANCOMYCIN HYDROCHLORIDE 500 MG: 5 INJECTION, POWDER, LYOPHILIZED, FOR SOLUTION INTRAVENOUS at 11:10

## 2023-08-29 RX ADMIN — ACETAMINOPHEN 650 MG: 325 TABLET, FILM COATED ORAL at 07:11

## 2023-08-29 RX ADMIN — LEVETIRACETAM 500 MG: 100 INJECTION, SOLUTION, CONCENTRATE INTRAVENOUS at 05:04

## 2023-08-29 RX ADMIN — FOLIC ACID 1 MG: 1 TABLET ORAL at 05:04

## 2023-08-29 ASSESSMENT — COGNITIVE AND FUNCTIONAL STATUS - GENERAL
WALKING IN HOSPITAL ROOM: TOTAL
CLIMB 3 TO 5 STEPS WITH RAILING: TOTAL
SUGGESTED CMS G CODE MODIFIER MOBILITY: CN
TOILETING: TOTAL
STANDING UP FROM CHAIR USING ARMS: TOTAL
HELP NEEDED FOR BATHING: TOTAL
PERSONAL GROOMING: A LOT
MOBILITY SCORE: 6
SUGGESTED CMS G CODE MODIFIER DAILY ACTIVITY: CM
DRESSING REGULAR UPPER BODY CLOTHING: TOTAL
EATING MEALS: A LOT
DRESSING REGULAR LOWER BODY CLOTHING: TOTAL
MOVING TO AND FROM BED TO CHAIR: UNABLE
DAILY ACTIVITIY SCORE: 8
TURNING FROM BACK TO SIDE WHILE IN FLAT BAD: UNABLE
MOVING FROM LYING ON BACK TO SITTING ON SIDE OF FLAT BED: UNABLE

## 2023-08-29 ASSESSMENT — ENCOUNTER SYMPTOMS
ABDOMINAL PAIN: 1
BACK PAIN: 1
NERVOUS/ANXIOUS: 1
CHILLS: 0
HEADACHES: 0
HEARTBURN: 0
PALPITATIONS: 0
COUGH: 0
SPEECH CHANGE: 0
DIZZINESS: 0
FOCAL WEAKNESS: 0
WHEEZING: 0
DIARRHEA: 0
FLANK PAIN: 0
FEVER: 0
SORE THROAT: 0
MYALGIAS: 1
DIARRHEA: 1
VOMITING: 0
NECK PAIN: 0
SHORTNESS OF BREATH: 0
BLURRED VISION: 0
BACK PAIN: 0
WEAKNESS: 1
CONSTIPATION: 0
NAUSEA: 0
SENSORY CHANGE: 0
BLOOD IN STOOL: 0
DIAPHORESIS: 0
WEAKNESS: 0
DEPRESSION: 0

## 2023-08-29 ASSESSMENT — PAIN DESCRIPTION - PAIN TYPE: TYPE: ACUTE PAIN

## 2023-08-29 NOTE — PROGRESS NOTES
Monitor Summary  Rhythm: Sinus Tachycardia  Rate: 100-110s  Ectopy: PVCs  .16 / .07 / .34

## 2023-08-29 NOTE — PROGRESS NOTES
Infectious Disease Progress Note    Author: Roxanna Day M.D. Date & Time of service: 2023  2:09 PM    Chief Complaint:   cdiff colitis  GB infection    Interval History:  67 y.o. female admitted 2023 for above as a transfer    AF WBC 16.1 more awake no acute events  Labs Reviewed and Medications Reviewed.    Review of Systems:  Review of Systems   Constitutional:  Negative for fever.   Respiratory:  Negative for shortness of breath.        Hemodynamics:  Temp (24hrs), Av.9 °C (98.5 °F), Min:36.8 °C (98.2 °F), Max:37.1 °C (98.8 °F)  Temperature: 37.1 °C (98.8 °F), Monitored Temp: 36.4 °C (97.5 °F)  Pulse  Av.3  Min: 60  Max: 127   Blood Pressure : 118/82       Physical Exam:  Physical Exam  Vitals and nursing note reviewed.   Constitutional:       General: She is not in acute distress.     Appearance: She is not toxic-appearing or diaphoretic.   Cardiovascular:      Rate and Rhythm: Tachycardia present.   Pulmonary:      Effort: Pulmonary effort is normal. No respiratory distress.   Abdominal:      General: There is no distension.   Skin:     Coloration: Skin is pale.         Meds:    Current Facility-Administered Medications:     [START ON 2023] vancomycin 50 mg/mL **AND** metroNIDAZOLE (FLAGYL) IV    cefepime    thiamine    folic acid    lidocaine    diclofenac sodium    insulin regular **AND** POC blood glucose manual result **AND** NOTIFY MD and PharmD **AND** Administer 20 grams of glucose (approximately 8 ounces of fruit juice) every 15 minutes PRN FSBG less than 70 mg/dL **AND** dextrose bolus    ondansetron    metoprolol tartrate    Pharmacy    levETIRAcetam (Keppra) IV    acetaminophen    ondansetron    levalbuterol    Labs:  Recent Labs     23  0030 23  0405 23  0745   WBC 21.0* 15.3* 16.1*   RBC 3.20* 3.01* 2.87*   HEMOGLOBIN 10.2* 9.8* 9.3*   HEMATOCRIT 29.6* 29.2* 28.6*   MCV 92.5 97.0 99.7*   MCH 31.9 32.6 32.4   RDW 48.9 52.5* 54.6*   PLATELETCT  561* 684* 768*   MPV 11.1 10.8 10.7     Recent Labs     08/28/23  0405 08/28/23  0743 08/29/23  0745   SODIUM 138 138 137   POTASSIUM 3.4* 3.3* 3.9   CHLORIDE 109 106 106   CO2 20 22 23   GLUCOSE 125* 141* 141*   BUN 11 11 10     Recent Labs     08/27/23  0030 08/28/23  0405 08/28/23  0743 08/29/23  0745   ALBUMIN 2.6* 2.8*  --  2.7*   TBILIRUBIN 0.4 0.4  --  0.3   ALKPHOSPHAT 789* 519*  --  365*   TOTPROTEIN 5.0* 5.0*  --  4.9*   ALTSGPT 30 22  --  16   ASTSGOT 33 24  --  19   CREATININE 0.54 0.49* 0.52 0.45*       Imaging:  DX-ABDOMEN FOR TUBE PLACEMENT    Result Date: 8/28/2023 8/28/2023 5:43 AM HISTORY/REASON FOR EXAM: NG Tube TECHNIQUE/EXAM DESCRIPTION: Single AP view of the abdomen COMPARISON:  August 21, 2023 FINDINGS: Hazy bilateral lower lobe opacities are seen. Pigtail catheter overlies the right abdomen. Nasogastric tube is seen, the tip lies to the left of the lumbar spine.  The bowel gas pattern in the upper abdomen appears nonspecific. The bony structures appear age-appropriate.     1.  Nonspecific bowel gas pattern in the upper abdomen. 2.  Nasogastric tube tip terminates overlying the expected location of the gastric body. 3.  Bilateral pulmonary edema and/or infiltrates    DX-CHEST-LIMITED (1 VIEW)    Result Date: 8/28/2023 8/28/2023 5:43 AM HISTORY/REASON FOR EXAM: Shortness of Breath TECHNIQUE/EXAM DESCRIPTION:  Single AP view of the chest. COMPARISON: August 25, 2023 FINDINGS: Position of medical devices appears stable. The cardiac silhouette appears within normal limits. The mediastinal contour appears within normal limits.  The central  pulmonary vasculature appears prominent and indistinct. Bilateral lung volumes are diminished.  Diffuse scattered hazy pulmonary parenchymal opacities are seen. No significant pleural effusions are identified. The bony structures appear age-appropriate.     1.  Pulmonary edema and/or infiltrates    CT-DRAIN-CHOLECTYSTOSTOMY    Result Date:  8/27/2023 8/27/2023 3:32 PM HISTORY/REASON FOR EXAM:  Chronic cholecystitis TECHNIQUE/EXAM DESCRIPTION AND NUMBER OF VIEWS:  CT guided gallbladder drainage. Low dose optimization technique was utilized for this CT exam including automated exposure control and adjustment of the mA and/or kV according to patient size. PROCEDURE:  Informed consent was obtained. Localizing CT images were obtained with the patient in supine position. The skin was prepped with Betadine and draped in a sterile fashion. SEDATION: Moderate sedation was provided. Pulse oximetry and continuous cardiac monitoring by the nurse was performed throughout the exam. Intraservice time was 30 minutes. Following local anesthesia with 1% Lidocaine, a 17 G guiding needle was placed and needle path confirmed with CT. An Amplatz guidewire was placed and following serial tract dilatation, a 8 Persian pigtail locking catheter was placed. A specimen was collected and submitted for culture and sensitivity and Gram stain. Total of 20 mL stanley fluid was drained. The catheter was secured to the skin and connected to gravity bag drainage. Final CT images were obtained documenting catheter position. The patient tolerated the procedure well with no evidence of complication. COMPARISON:CT scan abdomen/pelvis FINDINGS:Healing loop cholecystostomy drain is well positioned in the gallbladder     1. CT guided gallbladder Drainage. 2. Gallbladder drain should remain in place for approximately 4 weeks and a tractogram should be performed before removal.    US-CHEST    Result Date: 8/26/2023 8/26/2023 12:27 PM HISTORY/REASON FOR EXAM:  Shortness of breath TECHNIQUE/EXAM DESCRIPTION AND NUMBER OF VIEWS: Ultrasound of left chest including pleural space COMPARISON: None FINDINGS: Trace left pleural effusion is identified.     Minimal left pleural fluid is identified.    DX-CHEST-LIMITED (1 VIEW)    Result Date: 8/26/2023 8/26/2023 1:00 AM HISTORY/REASON FOR EXAM: Shortness  of Breath TECHNIQUE/EXAM DESCRIPTION:  Single AP view of the chest. COMPARISON: August 21, 2023 FINDINGS: Nasogastric tube is identified with the tip terminating in the gastric body.   The cardiac silhouette appears within normal limits. The mediastinal contour appears within normal limits.  The central  pulmonary vasculature appears prominent and indistinct. Bilateral lung volumes are diminished.  Diffuse scattered hazy pulmonary parenchymal opacities are seen. Blunting of bilateral costophrenic angles is seen, compatible with trace bilateral pleural effusions. The bony structures appear age-appropriate.     1.  Pulmonary edema and/or infiltrates, similar compared to prior study. 2.  Trace bilateral pleural effusions    NM-HEPATOBILIARY SCAN    Result Date: 8/23/2023  HISTORY/REASON FOR EXAM:  Pain. TECHNIQUE/EXAM DESCRIPTION: Nuclear medicine HIDA scan with CCK, 8/23/2023 2:02 PM. The patient was administered 5.5 mCi Technitium-99m Choletec intravenously, into the left arm IV by the nuclear medicine technologist on 8/23/2023 at 1500. COMPARISON: CT from 8/22/2023 FINDINGS: There is prompt homogeneous uptake of tracer by the liver. Gallbladder is not visualized throughout the exam at 2 1/2 hours. Passage into the small bowel by 9 minutes. Cholecystokinin was not administered due to nonvisualization of the gallbladder.     1. Gallbladder is not felt to be visualized throughout the exam consistent with chronic cholecystitis. Elaine Teresabury 8/23/2023 4:53 PM    US-ABDOMEN COMPLETE SURVEY    Result Date: 8/22/2023  HISTORY/REASON FOR EXAM: Abnormal Labs; evaluate gb / ducts. TECHNIQUE/EXAM DESCRIPTION: Ultrasound abdomen complete.8/22/2023 1:41 PM COMPARISON: None FINDINGS: Pancreas:The tail is suboptimally visualized due to overlying bowel gas.. Aorta and IVC: Normal in caliber.. Liver:There is fatty change of the liver.. Gallbladder: Gallbladder is distended. No definite stones are seen.. Common bile duct: 3 mm,  which is normal. Spleen: Normal in size and echogenicity.. Kidneys:The kidneys are normal in size and echogenicity.. The portal vein is patent with flow in the proper direction into the liver. Portal vein measures 8 mm in diameter. This is normal. There is No ascites.     Fatty change of liver. Distended gallbladder. Albino Fischer MD 8/22/2023 3:31 PM    CT ABDOMEN-PELVIS WITH IV CONTRAST    Result Date: 8/22/2023  HISTORY/REASON FOR EXAM:  evaluate for bowel perf / sinus tachycardia / f/u bowel colitis TECHNIQUE/EXAM DESCRIPTION: CT scan of the abdomen and pelvis with contrast.  Both automated exposure control and Automated adjustment of tube current based on patient size are utilized.  8/22/2023 12:38 PM. Total DLP: mGy*cm COMPARISON: 8/21/2023 field liver there is fatty change of liver. FINDINGS: Lung bases:  There are small bilateral pleural effusions with associated bilateral lower lobe areas of collapsed right greater than left. There is no free air. Liver:  Normal. Gallbladder:  Gallbladder is distended. Spleen:  Normal. Adrenal glands:  Normal. Pancreas:  Normal. Kidneys:  Normal. Appendix:  Not visualized. There are degenerative changes thoracolumbar spine. Pelvis:  There is Nguyen catheter in place. There is a small amount of free fluid in the pelvis. There is diffuse severe colitis with marked edema of the wall and pericolonic stranding. The SMA, SMV and NIGEL all enhance normally. There is no free air. There is no obstruction. Adenopathy: There is no evidence for pathologically enlarged retroperitoneal or mesenteric adenopathy.     1.  Findings are most compatible with a diffuse colitis which appears similar to prior study. Given the appearance and distribution, most likely infectious. 2.  Small amount of free fluid in the pelvis. 3.  Small bilateral pleural effusions with associated bilateral areas of collapsed right greater than left. This appears fairly similar to prior study. Albino Fischer MD  8/22/2023 1:08 PM DLP Reporting Thresholds for Incorrect/Repeated Exams - DLP in mGy*cm Head/Neck:  0-year-old 3840, 1-year-old 5880, 5-year-old 8770, 10-year-old 77153 and adult 06443 Head:  0-year-old 4540, 1-year-old 7460, 5-year-old 20944, 10-year-old 93243 and adult 93988 Neck:  0-year-old 2940, 1-year-old 4160, 5-year-old 4550, 10-year-old 6320 and adult 8470 Chest:  0-year-old 550, 1-year-old 830, 5-year-old 1200, 10-year-old 3840 and adult 3570 Abd/pelvis:  0-year-old 440, 1-year-old 720, 5-year-old 1080, 10-year-old 3330 and adult 3330 Trunk(C/A/P):  0-year-old 490, 1-year-old 770, 5-year-old 1140, 10-year-old 3570 and adult 3330    CT-CTA CHEST PULMONARY ARTERY W/ RECONS    Result Date: 8/22/2023  HISTORY/REASON FOR EXAM:  evaluate for pneumonia / pe; pt with sinus tachycardia TECHNIQUE/EXAM DESCRIPTION: CTA of the chest with contrast for pulmonary arteries. MIP reformatted images were performed. Both automated exposure control and Automated adjustment of tube current based on patient size are utilized.  8/22/2023 12:38 PM Total DLP: 1797 mGy*cm COMPARISON: 8/17/2023 FINDINGS: Images are degraded by patient motion artifact. LUNGS: There are bilateral lower lobe areas of collapse. There are small bilateral pleural effusions right greater than left. PULMONARY ARTERIES: Images degraded by motion. This makes evaluation of the smaller segmental and subsegmental arteries limited. No definite central or lobar pulmonary embolism identified. HEART: Normal in size. There is no pericardial effusion. AORTA: Normal in caliber. MEDIASTINUM: No pathologically enlarged adenopathy. Partially visualized on this study is a splenic flexure which appears thick-walled and edematous with pericolonic stranding. This will be evaluated on CT abdomen and pelvis which we'll be dictated separately.     1.  Bilateral lower lobe areas of partial collapse. 2.  Small bilateral pleural effusions right greater than left. 3.  Images degraded  by motion. This makes evaluation of the smaller segmental and subsegmental arteries limited. No definite central or lobar pulmonary embolism identified. Albino Fischer MD 8/22/2023 1:05 PM DLP Reporting Thresholds for Incorrect/Repeated Exams - DLP in mGy*cm Head/Neck:  0-year-old 3840, 1-year-old 5880, 5-year-old 8770, 10-year-old 48816 and adult 16708 Head:  0-year-old 4540, 1-year-old 7460, 5-year-old 39955, 10-year-old 59789 and adult 09795 Neck:  0-year-old 2940, 1-year-old 4160, 5-year-old 4550, 10-year-old 6320 and adult 8470 Chest:  0-year-old 550, 1-year-old 830, 5-year-old 1200, 10-year-old 3840 and adult 3570 Abd/pelvis:  0-year-old 440, 1-year-old 720, 5-year-old 1080, 10-year-old 3330 and adult 3330 Trunk(C/A/P):  0-year-old 490, 1-year-old 770, 5-year-old 1140, 10-year-old 3570 and adult 3330     CT-HEAD W/O    Result Date: 8/22/2023  HISTORY/REASON FOR EXAM: . TECHNIQUE/EXAM DESCRIPTION: CT scan of the brain without contrast. Both automated exposure control and Automated adjustment of tube current based on patient size are utilized. 8/22/2023 12:38 PM. CT scan of the brain was carried out without contrast in the normal manner. Total DLP: 1080 mGy*cm COMPARISON: None. FINDINGS: There is small vessel ischemic disease.  There is atrophy commensurate with age. There is no evidence for mass, mass effect, hemorrhage or extra-axial fluid collection.  There is no acute cortical infarction. There is no fracture. No air fluid levels in the paranasal sinuses are seen.     No evidence for acute intracranial abnormality. Albino Fischer MD 8/22/2023 12:58 PM DLP Reporting Thresholds for Incorrect/Repeated Exams - DLP in mGy*cm Head/Neck:  0-year-old 3840, 1-year-old 5880, 5-year-old 8770, 10-year-old 69548 and adult 09079 Head:  0-year-old 4540, 1-year-old 7460, 5-year-old 07226, 10-year-old 89804 and adult 48700 Neck:  0-year-old 2940, 1-year-old 4160, 5-year-old 4550, 10-year-old 6320 and adult 8470 Chest:   0-year-old 550, 1-year-old 830, 5-year-old 1200, 10-year-old 3840 and adult 3570 Abd/pelvis:  0-year-old 440, 1-year-old 720, 5-year-old 1080, 10-year-old 3330 and adult 3330 Trunk(C/A/P):  0-year-old 490, 1-year-old 770, 5-year-old 1140, 10-year-old 3570 and adult 3330    [id number] VIEW    Result Date: 8/21/2023  HISTORY/REASON FOR EXAM:  Line/Tube Placement. TECHNIQUE/EXAM DESCRIPTION AND NUMBER OF VIEWS: Abdomen (one view), 8/21/2023 9:50 AM. COMPARISON: August 17, 2023 FINDINGS: An NG tube is been placed with the proximal side port below left hemidiaphragm. Bowel gas pattern is nonspecific. There is no organomegaly. Mild degenerative changes spine.     Satisfactory NG tube placement. Trae Mcmillan MD 8/21/2023 10:31 AM    DX-CHEST-PORTABLE (1 VIEW)    Result Date: 8/21/2023  HISTORY/REASON FOR EXAM: Shortness of Breath. TECHNIQUE/EXAM DESCRIPTION: Chest x-ray, one portable view, 8/21/2023 2:51 AM. COMPARISON: 8/20/2023 FINDINGS: There is bibasilar atelectasis. There is evidence of a moderate right and small left pleural effusion. Heart is top normal in size. Jugular venous catheter is noted on the right, and appear stable.     Bilateral pleural effusions. By basilar atelectasis.    CT ABDOMEN-PELVIS WITH IV CONTRAST    Result Date: 8/21/2023  HISTORY/REASON FOR EXAM: . Eval for SBO TECHNIQUE/EXAM DESCRIPTION: CT scan of the abdomen and pelvis with IV contrast, 8/21/2023 5:02 AM. This examination was conducted with Automated Exposure Control and Automated Adjustment of Tube Current based on patient size. Following the administration of IV contrast, helical imaging was performed from the dome of the diaphragm to the pubic symphysis. Total DLP: 852 mGy*cm COMPARISON: None. FINDINGS: Lung bases:. There are small bilateral pleural effusions. There is atelectasis of the lung bases. The heart is normal in size. Liver:  The liver demonstrates evidence of mild diffuse fatty infiltration. Gallbladder:  The gallbladder  is prominently distended, measuring greater than 10 cm in length, and 5.5 cm in width. Spleen:  The spleen appears normal. Stomach:  The stomach and the area of the duodenum appears unremarkable. Adrenal glands:  No adrenal mass is identified. Pancreas:  The pancreas is normal. Kidneys:  The kidneys enhance promptly and symmetrically. There is no evidence of hydronephrosis or parenchymal lesions. Bowel: There is severe thickening and abnormal wall enhancement noted involving the entire colon, especially the sigmoid colon, where wall thickness exceeds 1.5 cm. Loops of colon demonstrate liquid stool, as do loops of distal small bowel. The findings are similar, but more substantial than that which was seen on prior study of 8/17/2023 There is no evidence of bowel obstruction. Appendix is not well seen There is a mild amount of free fluid within the abdomen. Catheter is noted within the urinary bladder. The abdominal aorta and its major branches appear unremarkable     There is again evidence of severe inflammation of the entire colon, which appears more substantial than that seen previously. Bilateral pleural effusions are now noted, along with atelectasis at the lung bases. Diffuse fatty infiltration of liver is again noted. Edgar Lundy MD 8/21/2023 5:13 AM DLP Reporting Thresholds for Incorrect/Repeated Exams - DLP in mGy*cm Head/Neck:  0-year-old 3840, 1-year-old 5880, 5-year-old 8770, 10-year-old 81385 and adult 18676 Head:  0-year-old 4540, 1-year-old 7460, 5-year-old 24826, 10-year-old 90152 and adult 13837 Neck:  0-year-old 2940, 1-year-old 4160, 5-year-old 4550, 10-year-old 6320 and adult 8470 Chest:  0-year-old 550, 1-year-old 830, 5-year-old 1200, 10-year-old 3840 and adult 3570 Abd/pelvis:  0-year-old 440, 1-year-old 720, 5-year-old 1080, 10-year-old 3330 and adult 3330 Trunk(C/A/P):  0-year-old 490, 1-year-old 770, 5-year-old 1140, 10-year-old 3570 and adult 3330    DX-CHEST-PORTABLE (1 VIEW)    Result  Date: 8/20/2023  HISTORY/REASON FOR EXAM:  Shortness of Breath. TECHNIQUE/EXAM DESCRIPTION AND NUMBER OF VIEWS: Portable chest (one view), 8/19/2023 6:10 PM. COMPARISON: August 15, 2023 FINDINGS: There are bilateral pleural effusions and lower lobe consolidations or atelectasis. Cardiac silhouette is suboptimally visualized due to the lung findings.     Development of bilateral pleural effusions and lower lobe consolidations or atelectasis. Trae Mcmillan MD 8/20/2023 11:52 AM    DX-CHEST-PORTABLE (1 VIEW)    Result Date: 8/20/2023  HISTORY/REASON FOR EXAM:  Line placement. Line placement TECHNIQUE/EXAM DESCRIPTION AND NUMBER OF VIEWS: Portable chest (one view), 8/19/2023 7:34 PM. COMPARISON: August 19, 2023 at 1816 FINDINGS: Right jugular central line is in place with the tip in the superior vena cava. There are bilateral pleural effusions and lung consolidation or atelectasis.     Right jugular central line. Trae Mcmillan MD 8/20/2023 11:23 AM    DX-CHEST-PORTABLE (1 VIEW)    Result Date: 8/20/2023  HISTORY/REASON FOR EXAM:  central line manipulation. central line manipulation TECHNIQUE/EXAM DESCRIPTION AND NUMBER OF VIEWS: Portable chest (one view), 8/20/2023 9:22 AM. COMPARISON: August 19, 2023 FINDINGS: Right jugular central line tip is present in the superior vena cava. There are bilateral pleural effusions and lower lobe consolidations or atelectasis.     1. Central line as described 2. Bilateral pleural effusions and lower lobe consolidations or atelectasis. Trae Mcmillan MD 8/20/2023 9:44 AM    CT ABDOMEN-PELVIS WITH IV CONTRAST    Result Date: 8/17/2023   CT of the abdomen and pelvis with IV contrast. HISTORY/REASON FOR EXAM:  Intractable vomiting, left-sided abdominal pain, leukocytosis TECHNIQUE/EXAM DESCRIPTION: CT scan of the abdomen and pelvis with contrast.  Both automated exposure control and Automated adjustment of tube current based on patient size are utilized. Total DLP: 1199 mGy*cm COMPARISON:  None. FINDINGS: Lungs: The visualized lung bases and mediastinum are clear. Solid organs: There is marked hepatic steatosis.  The spleen, pancreas and adrenal glands are unremarkable. The gallbladder is distended. Kidneys and Bladder/Pelvis: There is no nephrolithiasis or hydronephrosis.  No enhancing renal masses. The kidneys enhance symmetrically. No ureteral or bladder stones are seen. The bladder is grossly unremarkable. No pelvic masses. Bowel and Appendix: There is marked circumferential wall thickening of the entire colon with mild pericolonic inflammatory changes.  Nondilated small bowel.  No pneumatosis.  No extraluminal air or fluid collections. Lymphatics:  No abdominal, pelvic, or retroperitoneal lymphadenopathy. Other: No concerning osseous lesions. No fractures.     Marked circumferential wall thickening of the entire colon concerning for severe pancolitis. Hepatic steatosis. Elaine Long 8/17/2023 11:51 AM DLP Reporting Thresholds for Incorrect/Repeated Exams - DLP in mGy*cm Head/Neck:  0-year-old 3840, 1-year-old 5880, 5-year-old 8770, 10-year-old 63868 and adult 62137 Head:  0-year-old 4540, 1-year-old 7460, 5-year-old 74501, 10-year-old 41739 and adult 86746 Neck:  0-year-old 2940, 1-year-old 4160, 5-year-old 4550, 10-year-old 6320 and adult 8470 Chest:  0-year-old 550, 1-year-old 830, 5-year-old 1200, 10-year-old 3840 and adult 3570 Abd/pelvis:  0-year-old 440, 1-year-old 720, 5-year-old 1080, 10-year-old 3330 and adult 3330 Trunk(C/A/P):  0-year-old 490, 1-year-old 770, 5-year-old 1140, 10-year-old 3570 and adult 3330    CT-CTA CHEST PULMONARY ARTERY W/ RECONS    Result Date: 8/17/2023  HISTORY/REASON FOR EXAM:  Chest pain, positive D-dimer, rule out PE TECHNIQUE/EXAM DESCRIPTION: CTA of the chest with contrast for pulmonary arteries. MIP reformatted images were performed. Both automated exposure control and Automated adjustment of tube current based on patient size are utilized.  8/17/2023  11:00 AM Total DLP: 1199 mGy*cm COMPARISON: None. FINDINGS: There is no pulmonary embolism, aortic aneurysm or dissection. Bilateral dependent atelectatic changes.  There is no focal consolidation, effusion or pneumothorax. The heart is normal in size. There is no pericardial effusion. The aorta and great vessel origins are normal in caliber. No pathologically enlarged adenopathy. Marked hepatic steatosis. The bones are intact.     No acute cardiopulmonary process. Elaine Teresabury 8/17/2023 11:49 AM DLP Reporting Thresholds for Incorrect/Repeated Exams - DLP in mGy*cm Head/Neck:  0-year-old 3840, 1-year-old 5880, 5-year-old 8770, 10-year-old 70909 and adult 56051 Head:  0-year-old 4540, 1-year-old 7460, 5-year-old 77821, 10-year-old 95977 and adult 32299 Neck:  0-year-old 2940, 1-year-old 4160, 5-year-old 4550, 10-year-old 6320 and adult 8470 Chest:  0-year-old 550, 1-year-old 830, 5-year-old 1200, 10-year-old 3840 and adult 3570 Abd/pelvis:  0-year-old 440, 1-year-old 720, 5-year-old 1080, 10-year-old 3330 and adult 3330 Trunk(C/A/P):  0-year-old 490, 1-year-old 770, 5-year-old 1140, 10-year-old 3570 and adult 3330     DX-ABDOMEN COMPLETE WITH AP OR PA CXR    Result Date: 8/17/2023  HISTORY/REASON FOR EXAM:  Vomiting. TECHNIQUE/EXAM DESCRIPTION AND NUMBER OF VIEWS: Acute abdomen series (two views) with chest x-ray (one view):, 8/17/2023 9:33 AM. COMPARISON: None. FINDINGS: CHEST:  The lungs are clear.  The cardiomediastinal silhouette is midline and unremarkable. The costophrenic angles are sharp. ABDOMEN:  There is no evidence for obstruction or free air. No abnormal radiopaque calcifications are seen.  Degenerative changes of the lower lumbar spine.     1.  No evidence for acute cardiopulmonary disease. 2.  No evidence for obstruction or free air. Elaine Teresabury 8/17/2023 9:51 AM    DX-CHEST-PORTABLE (1 VIEW)    Result Date: 8/15/2023  HISTORY/REASON FOR EXAM:  SHORTNESS OF BREATH, CHEST PAIN. TECHNIQUE/EXAM  DESCRIPTION AND NUMBER OF VIEWS: Portable chest (one view), 8/15/2023 7:28 AM. COMPARISON: None. FINDINGS: The lungs are clear.  There is no focal consolidation, pneumothorax or effusion. The cardiomediastinal silhouette is normal in size. The bones are intact.     No acute cardiopulmonary process. Elaine Long 8/15/2023 8:43 AM      Micro:  Results       Procedure Component Value Units Date/Time    FLUID CULTURE W/GRAM STAIN [[id number]] Collected: 08/27/23 1600    Order Status: Completed Specimen: Body Fluid Updated: 08/29/23 1240     Significant Indicator NEG     Source BF     Site Galbladder drain     Culture Result No growth at 48 hours.     Gram Stain Result Rare Gram positive rods.    GRAM STAIN [[id number]] Collected: 08/27/23 1600    Order Status: Completed Specimen: Body Fluid Updated: 08/27/23 1941     Significant Indicator .     Source BF     Site Galbladder drain     Gram Stain Result Rare Gram positive rods.    FLUID CULTURE W/GRAM STAIN [[id number]]     Order Status: No result Specimen: Other Body Fluid     Cdiff By PCR Rflx Toxin [[id number]] Collected: 08/26/23 0045    Order Status: Completed Updated: 08/26/23 0713     C Diff by PCR Negative     Comment: C. difficile NOT detected by PCR.    Acute diarrhea Special Contact Precautions is required  until 48 hours after diarrhea has resolved, patient’s stool  has returned to baseline or until an infectious agent is  no longer suspected.  Treatment not indicated per guidelines.  Repeat testing not indicated within 7 days.          027-NAP1-BI Presumptive Negative     Comment: Presumptive 027/NAP1/BI target DNA sequences are NOT DETECTED.       FLUID CULTURE W/GRAM STAIN [[id number]]     Order Status: Canceled Specimen: Body Fluid from Thoracentesis Fluid     AFB CULTURE [[id number]]     Order Status: Canceled Specimen: Body Fluid from Thoracentesis Fluid     FUNGAL CULTURE [[id number]]     Order Status: Canceled Specimen: Body Fluid from Thoracentesis Fluid      Blood Culture,Hold [[id number]] Collected: 08/26/23 0110    Order Status: Completed Updated: 08/26/23 0250     Blood Culture Hold Collected    URINALYSIS [[id number]]  (Abnormal) Collected: 08/26/23 0058    Order Status: Completed Updated: 08/26/23 0220     Color DK Yellow     Character Clear     Specific Gravity 1.045     Ph 5.0     Glucose 500 mg/dL      Ketones Trace mg/dL      Protein 100 mg/dL      Bilirubin Small     Urobilinogen, Urine 0.2     Nitrite Positive     Leukocyte Esterase Trace     Occult Blood Trace     Micro Urine Req Microscopic    Blood Culture,Hold [[id number]] Collected: 08/25/23 2122    Order Status: Completed Updated: 08/26/23 0122     Blood Culture Hold Collected    Urinalysis [[id number]]     Order Status: No result Specimen: Urine     Culture Respiratory W/ GRM STN [[id number]]     Order Status: Completed Specimen: Respirate from Sputum     C Diff by PCR rflx Toxin [[id number]]     Order Status: Canceled Specimen: Stool             Assessment:  Active Hospital Problems    Diagnosis     *C. difficile colitis [A04.72]     Crohn's disease (HCC) [K50.90]     Paroxysmal atrial fibrillation (HCC) [I48.0]     Elevated TSH [R79.89]     Hypokalemia [E87.6]     Hypomagnesemia [E83.42]     Sepsis (HCC) [A41.9]     Hyperglycemia [R73.9]     Cholecystitis [K81.9]     Acute respiratory failure with hypoxia (HCC) [J96.01]     Respiratory alkalosis [E87.3]     Pleural effusion [J90]     Acute encephalopathy [G93.40]     Alcohol withdrawal delirium (HCC) [F10.931]     Seizures (HCC) [R56.9]     Dyslipidemia [E78.5]      Cholecystitis  -s/p drain placement 8/27  -gram stain with GPR-no growth  C diff colitis-positive 8/17 at OSH  -Cdiff done here neg  Leukocytosis, resolving  Crohns disease  Recent suicidal ideation  Thrombocytosis  ETOH abuse  Cirrhosis  Withdrawal seizure  Pancreatitis     PLAN:   Has completed 10 days course treatment for Cdiff colitis  Recommend prophylactic dosing as needing  antibiotics for cholecystitis  Continue flagyl and cefepime for 7 days from drain placement  FU cultures-neg to date  Stop date 9/2/2023 unless complication

## 2023-08-29 NOTE — PROGRESS NOTES
Hospital Medicine Daily Progress Note    Date of Service  8/29/2023    Chief Complaint  Odalys Keenan is a 67 y.o. female admitted 8/25/2023 with cholecystitis    Hospital Course  Admitted as a transfer for cholecystitis.  Patient was initially admitted for alcohol withdrawal delirium, she was noted to have pancolitis on CT scan, noted to be positive for C. difficile.  She was started on oral vancomycin and IV Flagyl.  She also has known history of Crohn's disease, and was not on any medication.  She had an episode of atrial fibrillation with rapid ventricular rate and converted back to sinus rhythm with Cardizem.  There was suspicion for cholecystitis on CT scan and ultrasound of the right upper quadrant.  HIDA scan was consistent with chronic cholecystitis.  She was then transferred here.  She was on antibiotics in the form of IV Zosyn, oral vancomycin, and IV Flagyl.  Interventional radiology was consulted on the case and patient underwent CT guided gallbladder drainage on  8/27/2023.  She was quite encephalopathic, NG tube was placed and tube feeding started for nutrition.  Infectious disease was consulted on the case, antibiotics were changed to IV cefepime plus Flagyl with end date of 9/2/2023, oral vancomycin was changed to prophylactic dose until antibiotics were done.  Gastroenterology was also consulted and recommended colonoscopy as outpatient when infection has resolved, and consideration for treatment of Crohn's disease then.  Interventional radiology recommends drain to remain in place for 6 weeks, before catheter removal, a cholecystogram with cholangiogram would be recommended to assure patency of the common duct, likewise, a tracto-gram may also be helpful to make sure that the catheter tract has matured so that there is not intraperitoneal bile leakage after catheter removal.    Interval Problem Update  Cholecystitis - drain in place, discussed case with interventional radiology  BHARGAV koroma  -infectious disease recommendations noted  A-fib - currently sinus  Encephalopathy - more alert and oriented  Crohn's -discussed case with gastroenterology    I have discussed this patient's plan of care and discharge plan at IDT rounds today with Case Management, Nursing, Nursing leadership, and other members of the IDT team.    Consultants/Specialty  GI, infectious disease, and interventional radiology    Code Status  Full Code    Disposition  The patient is medically cleared for discharge to home or a post-acute facility.  Anticipate discharge to: a short-term [facility] for inpatient care    I have placed the appropriate orders for post-discharge needs.    Review of Systems  Review of Systems   Constitutional:  Positive for malaise/fatigue. Negative for chills, diaphoresis and fever.   HENT:  Negative for congestion, hearing loss and sore throat.    Eyes:  Negative for blurred vision.   Respiratory:  Negative for cough, shortness of breath and wheezing.    Cardiovascular:  Negative for chest pain, palpitations and leg swelling.   Gastrointestinal:  Positive for abdominal pain. Negative for diarrhea, heartburn, nausea and vomiting.   Genitourinary:  Negative for dysuria, flank pain and hematuria.   Musculoskeletal:  Positive for back pain, joint pain and myalgias. Negative for neck pain.   Skin:  Negative for rash.   Neurological:  Positive for weakness. Negative for dizziness, sensory change, speech change, focal weakness and headaches.   Psychiatric/Behavioral:  The patient is nervous/anxious.         Physical Exam  Temp:  [36.8 °C (98.2 °F)-37.1 °C (98.8 °F)] 37.1 °C (98.8 °F)  Pulse:  [107-116] 107  Resp:  [18] 18  BP: (118-146)/(76-83) 118/82  SpO2:  [94 %-96 %] 94 %    Physical Exam  Vitals and nursing note reviewed.   HENT:      Head: Normocephalic and atraumatic.      Nose: No congestion.      Mouth/Throat:      Mouth: Mucous membranes are dry.   Eyes:      Extraocular Movements: Extraocular  movements intact.      Conjunctiva/sclera: Conjunctivae normal.   Cardiovascular:      Rate and Rhythm: Regular rhythm. Tachycardia present.      Heart sounds: No murmur heard.  Abdominal:      General: There is distension.      Tenderness: There is abdominal tenderness. There is no guarding or rebound.      Comments: Drain at RUQ   Musculoskeletal:      Cervical back: No tenderness.      Right lower leg: Edema present.      Left lower leg: Edema present.   Skin:     General: Skin is warm and dry.   Neurological:      General: No focal deficit present.      Mental Status: She is alert.      Cranial Nerves: No cranial nerve deficit.      Comments: Oriented to person and place   Psychiatric:         Mood and Affect: Mood is anxious.         Cognition and Memory: She exhibits impaired recent memory.         Fluids    Intake/Output Summary (Last 24 hours) at 8/29/2023 1516  Last data filed at 8/29/2023 1228  Gross per 24 hour   Intake 800 ml   Output 3500 ml   Net -2700 ml       Laboratory  Recent Labs     08/27/23  0030 08/28/23  0405 08/29/23  0745   WBC 21.0* 15.3* 16.1*   RBC 3.20* 3.01* 2.87*   HEMOGLOBIN 10.2* 9.8* 9.3*   HEMATOCRIT 29.6* 29.2* 28.6*   MCV 92.5 97.0 99.7*   MCH 31.9 32.6 32.4   MCHC 34.5 33.6 32.5   RDW 48.9 52.5* 54.6*   PLATELETCT 561* 684* 768*   MPV 11.1 10.8 10.7     Recent Labs     08/28/23  0405 08/28/23  0743 08/29/23  0745   SODIUM 138 138 137   POTASSIUM 3.4* 3.3* 3.9   CHLORIDE 109 106 106   CO2 20 22 23   GLUCOSE 125* 141* 141*   BUN 11 11 10   CREATININE 0.49* 0.52 0.45*   CALCIUM 8.0* 7.8* 7.8*                     Imaging  DX-CHEST-LIMITED (1 VIEW)   Final Result         1.  Pulmonary edema and/or infiltrates      DX-ABDOMEN FOR TUBE PLACEMENT   Final Result         1.  Nonspecific bowel gas pattern in the upper abdomen.   2.  Nasogastric tube tip terminates overlying the expected location of the gastric body.   3.  Bilateral pulmonary edema and/or infiltrates       CT-DRAIN-CHOLECTYSTOSTOMY   Final Result      1. CT guided gallbladder Drainage.      2. Gallbladder drain should remain in place for approximately 4 weeks and a tractogram should be performed before removal.      US-CHEST   Final Result      Minimal left pleural fluid is identified.      DX-CHEST-LIMITED (1 VIEW)   Final Result         1.  Pulmonary edema and/or infiltrates, similar compared to prior study.   2.  Trace bilateral pleural effusions           Assessment/Plan  * C. difficile colitis- (present on admission)  Assessment & Plan  Oral Vancomycin and IV Flagyl    Acute encephalopathy- (present on admission)  Assessment & Plan  Multifactorial  Avoid Benzodiazepines and Anticholinergics  Frequent orientation  Open window blinds during the day  Avoid naps during the day  Family at bedside whenever possible  Ensure adequate sleep at night - use Melatonin preferably  Avoid early morning labs  Avoid vital signs during sleep  Ambulate if possible  Provide adequate pain control  Monitor for urinary retention  Prevent and manage constipation  Discontinue IVs, Catheters, Tubes, Lines, Cardiac monitors, SCDs if possible    Cholecystitis- (present on admission)  Assessment & Plan  CT guided gallbladder drainage   8/27/2023  IR recommendation -drain to remain in place for 6 weeks  IV Cefepime + Flagyl  end date 9/2/2023    Alcohol withdrawal delirium (HCC)- (present on admission)  Assessment & Plan  Off CIWA  Thiamine + Folic    Dysphagia- (present on admission)  Assessment & Plan  TF  SLP to follow     Hypomagnesemia- (present on admission)  Assessment & Plan  IV Mg given  Follow level    Hypokalemia- (present on admission)  Assessment & Plan  IV KCl given  Follow cmp    Elevated TSH- (present on admission)  Assessment & Plan  FT4 and FT3 wnl  Check TSH, FT4 and FT3 on 11/1/2023    Paroxysmal atrial fibrillation (HCC)- (present on admission)  Assessment & Plan  Metoprolol    Crohn's disease (HCC)- (present on  admission)  Assessment & Plan  Gastroenterology following    Hyperglycemia- (present on admission)  Assessment & Plan  hgba1c 5.2    Pleural effusion- (present on admission)  Assessment & Plan  monitor    Respiratory alkalosis- (present on admission)  Assessment & Plan  monitor    Acute respiratory failure with hypoxia (HCC)- (present on admission)  Assessment & Plan  RT protocol    Seizures (HCC)- (present on admission)  Assessment & Plan  IV Keppra    Dyslipidemia- (present on admission)  Assessment & Plan  Hold Crestor         VTE prophylaxis:   SCDs/TEDs      I have performed a physical exam and reviewed and updated ROS and Plan today (8/29/2023). In review of yesterday's note (8/28/2023), there are no changes except as documented above.

## 2023-08-29 NOTE — PROGRESS NOTES
Radiology Progress Note   Author: WESTON Martinez Date & Time created: 8/29/2023  9:48 AM   Date of admission  8/25/2023  Note to reader: this note follows the APSO format rather than the historical SOAP format. Assessment and plan located at the top of the note for ease of use.    Chief Complaint  67 y.o. female admitted 8/25/2023 with abdominal pain and altered mentation.      HPI  67 y.o. female with a history of alcoholic liver disease, Crohn's disease (previously on immune modulators), and seizures transferred from [facility] in [place] on 08/25/2023 for suicidal ideations, left-sided abdominal pain,  nausea/vomiting, and alcohol withdrawal.  CT abdomen pelvis showed diffuse colitis and distended gallbladder.  Nuc med hepatobiliary scan was unable to visualize gallbladder.  Patient underwent a CT-guided cholecystostomy drain placement with IR Dr. Jean Baptiste on 08/27/2023 for suspected chronic cholecystitis.     Interval history:  08/28/23 - Noreen drain with 120 mL biliary output in the last 24 hours.  WBC 15.3 down from 21.0.  T. bili normal; alk phos trending down.    08/29/23 - jessica drain with 700 mL biliary output in last 24 hours.  WBC elevated but stable.  Patient on antibiotics per ID. T. bili WNL; alk phos trending down.     Principal Problem:    C. difficile colitis  Active Problems:    Seizures (HCC)    Dyslipidemia    Alcohol withdrawal delirium (HCC)    Cholecystitis    Acute respiratory failure with hypoxia (HCC)    Respiratory alkalosis    Pleural effusion    Acute encephalopathy    Hyperglycemia    Sepsis (HCC)    Crohn's disease (HCC)    Paroxysmal atrial fibrillation (HCC)    Elevated TSH    Hypokalemia    Hypomagnesemia      Plan IR  - Irrigate Jessica drain with 10 ml of sterile saline q shift; do not aspirate back  - Before catheter removal, a cholecystogram with cholangiogram would be recommended to assure patency of the common duct. Likewise, a tracto-gram may also be  helpful to make sure that the catheter tract has matured so that there is not intraperitoneal bile leakage after catheter removal.  - Drain should remain in place for 6 weeks to allow for tract maturity.  - ID following  - Continue to monitor labs, drain output     -Thank you for allowing Interventional Radiology team to participate in the patients care, if any additional care or requests are needed in the future please do not hesitate call or place IR order   [phone]                 Review of Systems  Physical Exam   Review of Systems   Constitutional:  Negative for chills and fever.   Respiratory:  Negative for shortness of breath.    Cardiovascular:  Negative for chest pain and palpitations.   Gastrointestinal:  Positive for abdominal pain. Negative for nausea and vomiting.   Neurological:  Negative for weakness and headaches.      Vitals:    08/29/23 0701   BP: 118/82   Pulse: (!) 107   Resp: 18   Temp: 37.1 °C (98.8 °F)   SpO2: 94%        Physical Exam  Constitutional:       Appearance: She is ill-appearing.   HENT:      Nose:      Comments: NG tube  Cardiovascular:      Rate and Rhythm: Normal rate.   Abdominal:      General: There is distension.      Tenderness: There is abdominal tenderness.      Comments: Jessica drain RUQ   Skin:     General: Skin is warm and dry.   Neurological:      General: No focal deficit present.      Mental Status: She is lethargic and confused.   Psychiatric:         Mood and Affect: Mood normal.         Behavior: Behavior normal.           Labs    Recent Labs     08/27/23  0030 08/28/23  0405 08/29/23  0745   WBC 21.0* 15.3* 16.1*   RBC 3.20* 3.01* 2.87*   HEMOGLOBIN 10.2* 9.8* 9.3*   HEMATOCRIT 29.6* 29.2* 28.6*   MCV 92.5 97.0 99.7*   MCH 31.9 32.6 32.4   MCHC 34.5 33.6 32.5   RDW 48.9 52.5* 54.6*   PLATELETCT 561* 684* 768*   MPV 11.1 10.8 10.7       Recent Labs     08/28/23  0405 08/28/23  0743 08/29/23  0745   SODIUM 138 138 137   POTASSIUM 3.4* 3.3* 3.9   CHLORIDE 109 106 106  "  CO2 20 22 23   GLUCOSE 125* 141* 141*   BUN 11 11 10   CREATININE 0.49* 0.52 0.45*   CALCIUM 8.0* 7.8* 7.8*       Recent Labs     08/27/23  0030 08/28/23  0405 08/28/23  0743 08/29/23  0745   ALBUMIN 2.6* 2.8*  --  2.7*   TBILIRUBIN 0.4 0.4  --  0.3   ALKPHOSPHAT 789* 519*  --  365*   TOTPROTEIN 5.0* 5.0*  --  4.9*   ALTSGPT 30 22  --  16   ASTSGOT 33 24  --  19   CREATININE 0.54 0.49* 0.52 0.45*       DX-CHEST-LIMITED (1 VIEW)   Final Result         1.  Pulmonary edema and/or infiltrates      DX-ABDOMEN FOR TUBE PLACEMENT   Final Result         1.  Nonspecific bowel gas pattern in the upper abdomen.   2.  Nasogastric tube tip terminates overlying the expected location of the gastric body.   3.  Bilateral pulmonary edema and/or infiltrates      CT-DRAIN-CHOLECTYSTOSTOMY   Final Result      1. CT guided gallbladder Drainage.      2. Gallbladder drain should remain in place for approximately 4 weeks and a tractogram should be performed before removal.      US-CHEST   Final Result      Minimal left pleural fluid is identified.      DX-CHEST-LIMITED (1 VIEW)   Final Result         1.  Pulmonary edema and/or infiltrates, similar compared to prior study.   2.  Trace bilateral pleural effusions            INR   Date Value Ref Range Status   08/25/2023 0.89 0.87 - 1.13 Final     Comment:     INR - Non-therapeutic Reference Range: 0.87-1.13  INR - Therapeutic Reference Range: 2.0-4.0       No results found for: \"POCINR\"     Intake/Output Summary (Last 24 hours) at 8/28/2023 0948  Last data filed at 8/28/2023 0921  Gross per 24 hour   Intake 660 ml   Output 4995 ml   Net -4335 ml      Labs not explicitly included in this progress note were reviewed by the author. Radiology/imaging not explicitly included in this progress note was reviewed by the author.     I have performed a physical exam and reviewed and updated ROS and Plan today (8/29/2023).     30 minutes in directly providing and coordinating care and extensive data " review.  No time overlap and excludes procedures.

## 2023-08-29 NOTE — CARE PLAN
The patient is Watcher - Medium risk of patient condition declining or worsening    Shift Goals  Clinical Goals: monitor respiratory status, hemodynamics  Patient Goals: KUN  Family Goals: updates    Progress made toward(s) clinical / shift goals:  Patient stable through shift          Problem: Knowledge Deficit - Standard  Goal: Patient and family/care givers will demonstrate understanding of plan of care, disease process/condition, diagnostic tests and medications  Outcome: Not Progressing

## 2023-08-29 NOTE — DISCHARGE SUMMARY
Discharge Summary    CHIEF COMPLAINT ON ADMISSION  No chief complaint on file.      Reason for Admission  Cholecystitis    Admission Date  8/25/2023     CODE STATUS  Full Code    HPI & HOSPITAL COURSE  This is a 67 y.o. female here with  as a transfer for cholecystitis.  Patient was initially admitted for alcohol withdrawal delirium, she was noted to have pancolitis on CT scan, noted to be positive for C. difficile.  She was started on oral vancomycin and IV Flagyl.  She also has known history of Crohn's disease, and was not on any medication.  She had an episode of atrial fibrillation with rapid ventricular rate and converted back to sinus rhythm with Cardizem.  There was suspicion for cholecystitis on CT scan and ultrasound of the right upper quadrant.  HIDA scan was consistent with chronic cholecystitis.  She was then transferred here.  She was on antibiotics in the form of IV Zosyn, oral vancomycin, and IV Flagyl.  Interventional radiology was consulted on the case and patient underwent CT guided gallbladder drainage on  8/27/2023.  She was quite encephalopathic, NG tube was placed and tube feeding started for nutrition.  Infectious disease was consulted on the case, antibiotics were changed to IV cefepime plus Flagyl with end date of 9/2/2023, oral vancomycin was changed to prophylactic dose until antibiotics were done. Gastroenterology was also consulted and recommended colonoscopy as outpatient when infection has resolved, and consideration for treatment of Crohn's disease then.  Interventional radiology recommends drain to remain in place for 6 weeks, before catheter removal, a cholecystogram with cholangiogram would be recommended to assure patency of the common duct, likewise, a tracto-gram may also be helpful to make sure that the catheter tract has matured so that there is not intraperitoneal bile leakage after catheter removal.      Therefore, she is discharged in good and stable condition to a  [facility] for inpatient care.    The patient met 2-midnight criteria for an inpatient stay at the time of discharge.      FOLLOW UP ITEMS POST DISCHARGE  None    DISCHARGE DIAGNOSES  Principal Problem:    C. difficile colitis (POA: Yes)  Active Problems:    Alcohol withdrawal delirium (HCC) (POA: Yes)    Cholecystitis (POA: Yes)    Acute encephalopathy (POA: Yes)    Seizures (HCC) (POA: Yes)    Acute respiratory failure with hypoxia (HCC) (POA: Yes)    Respiratory alkalosis (POA: Yes)    Pleural effusion (POA: Yes)    Hyperglycemia (POA: Yes)    Crohn's disease (HCC) (POA: Yes)    Paroxysmal atrial fibrillation (HCC) (POA: Yes)    Elevated TSH (POA: Yes)    Hypokalemia (POA: Yes)    Hypomagnesemia (POA: Yes)    Dysphagia (POA: Yes)    Sepsis (HCC) (POA: Yes)    Dyslipidemia (POA: Yes)  Resolved Problems:    * No resolved hospital problems. *      FOLLOW UP  No future appointments.  No follow-up provider specified.    MEDICATIONS ON DISCHARGE     Medication List        START taking these medications        Instructions   acetaminophen 325 MG Tabs  Commonly known as: Tylenol   2 Tablets by Enteral Tube route every 6 hours as needed for Mild Pain or Fever.  Dose: 650 mg     dextrose 50% 50 % Soln  Commonly known as: D50W   Infuse 50 mL into a venous catheter as needed (If FSBG is less than or equal to 70 mg/dL and If patient is NPO or unable to take oral).  Dose: 25 g     insulin regular 100 Unit/mL Soln  Commonly known as: Humulin R   Inject 2-9 Units under the skin 4 Times a Day,Before Meals and at Bedtime.  Dose: 2-9 Units     levETIRAcetam 500 MG/5ML Soln  Commonly known as: Keppra   Infuse 5 mL into a venous catheter every 12 hours.  Dose: 500 mg     metoprolol tartrate 25 MG Tabs  Commonly known as: Lopressor  Replaces: metoprolol SR 25 MG Tb24   0.5 Tablets by Enteral Tube route 2 times a day.  Dose: 12.5 mg     metroNIDAZOLE 500 MG/100ML Soln  Commonly known as: Flagyl   Infuse 100 mL into a  venous catheter every 8 hours for 4 days.  Dose: 500 mg     NS SOLN 100 mL with cefepime 2 GM SOLR 2 g   Infuse 2 g into a venous catheter every 12 hours for 4 days.  Dose: 2 g     ondansetron 4 MG/2ML Soln injection  Commonly known as: Zofran   Infuse 2 mL into a venous catheter every four hours as needed for Nausea.  Dose: 4 mg     oxyCODONE immediate-release 5 MG Tabs  Commonly known as: Roxicodone   1-2 Tablets by Enteral Tube route every four hours as needed for Severe Pain for up to 5 days.  Dose: 5-10 mg     thiamine 100 MG tablet  Start taking on: August 30, 2023  Commonly known as: Thiamine   1 Tablet by Enteral Tube route every day.  Dose: 100 mg     vancomycin 50 mg/mL 50 mg/mL Soln  Start taking on: August 30, 2023   2.5 mL by Enteral Tube route every day for 3 days.  Dose: 125 mg            CONTINUE taking these medications        Instructions   folic acid 1 MG Tabs  Commonly known as: Folvite   Take 1 Tablet by mouth every day.  Dose: 1 mg            STOP taking these medications      gabapentin 100 MG Caps  Commonly known as: Neurontin     metoprolol SR 25 MG Tb24  Commonly known as: Toprol XL  Replaced by: metoprolol tartrate 25 MG Tabs     mirtazapine 30 MG Tabs tablet  Commonly known as: Remeron     naltrexone 50 MG Tabs  Commonly known as: Depade     omeprazole 40 MG delayed-release capsule  Commonly known as: PriLOSEC     rosuvastatin 20 MG Tabs  Commonly known as: Crestor     traMADol 50 MG Tabs  Commonly known as: Ultram              Allergies  Allergies   Allergen Reactions    Nsaids Vomiting       DIET  Orders Placed This Encounter   Procedures    Diet: Diet Tube Feed; Formula: Impact Peptide 1.5; Goal Rate (mL/Hour): 45; Duration: 24 HR; Tube Feed Time Unit: Hours/Day     Start at 10 mL/hr. Advance slowly by 10 mL q 8 hrs to avoid the risk of refeeding.     Standing Status:   Standing     Number of Occurrences:   1     Order Specific Question:   Diet     Answer:   Diet Tube Feed [35]      Order Specific Question:   Formula:     Answer:   Impact Peptide 1.5     Order Specific Question:   Goal Rate (mL/Hour)     Answer:   45     Order Specific Question:   Route     Answer:   NG     Order Specific Question:   Duration     Answer:   24 HR     Order Specific Question:   Tube Feed Time Unit     Answer:   Hours/Day       ACTIVITY  As tolerated.  Weight bearing as tolerated    LINES, DRAINS, AND WOUNDS  This is an automated list. Peripheral IVs will be removed prior to discharge.  CVC Triple Lumen 08/25/23 Right Internal jugular (Active)   Site Assessment Clean;Dry;Intact 08/29/23 0700   Lumen 1 Status Blood return noted;Flushed;Infusing;Scrubbed the hub prior to access 08/29/23 0700   Lumen 3 Status Blood return noted;Flushed;Infusing;Scrubbed the hub prior to access 08/29/23 0700   Lumen 2 Status Blood return noted;Flushed;Infusing;Scrubbed the hub prior to access 08/29/23 0700   Dressing Type Transparent;Biopatch;Occlusive 08/29/23 0700   Dressing Status Clean;Dry;Intact 08/29/23 0700   Dressing Intervention N/A 08/29/23 0200   Waveform Not Applicable 08/28/23 1000   Line Calibrated Not Applicable 08/28/23 1000   Line Necessity Assessed Lack of Peripheral Access 08/27/23 1000   Line Necessity Reviewed With MD Rodriguez 08/26/23 1200     Biliary Drain Biliary 8 Fr. RUQ (Active)   Site Description Unable to view 08/27/23 1630   Dressing Status Clean;Dry;Intact 08/27/23 1630   Drainage Color Yellow 08/27/23 1630   Drain Status Open to gravity drainage 08/27/23 1630   Output (mL) 300 mL 08/29/23 0615       Rectal Tube (Active)   Skin Care Protective Barrier Applied;Area cleansed 08/27/23 1900   Skin Integrity Red 08/27/23 1900   Flushed Per Protocol Yes 08/27/23 1900   Balloon Inflated (mL) 45 08/27/23 1900   Rectal Tube Output 1100 mL 08/28/23 0713       Urethral Catheter (Active)   Site Assessment Clean;Skin intact 08/27/23 1658   Collection Container Standard drainage bag 08/27/23 1658   Urinary Catheter Care  Tamper Evident Seal in Place;Drainage Tube Extended;Drainage Bag Not Overfilled;Drainage Tubing Properly Secured;Drainage Bag Below Bladder Level and Not on Floor;Cath Care Done with Soap & Water;Cath Care Done with CHG Wipes 08/27/23 1658   Securement Method Securing device (Describe) 08/27/23 1658   Output (mL) 300 mL 08/29/23 0615       CVC Triple Lumen 08/25/23 Right Internal jugular (Active)   Site Assessment Clean;Dry;Intact 08/29/23 0700   Lumen 1 Status Blood return noted;Flushed;Infusing;Scrubbed the hub prior to access 08/29/23 0700   Lumen 3 Status Blood return noted;Flushed;Infusing;Scrubbed the hub prior to access 08/29/23 0700   Lumen 2 Status Blood return noted;Flushed;Infusing;Scrubbed the hub prior to access 08/29/23 0700   Dressing Type Transparent;Biopatch;Occlusive 08/29/23 0700   Dressing Status Clean;Dry;Intact 08/29/23 0700   Dressing Intervention N/A 08/29/23 0200   Waveform Not Applicable 08/28/23 1000   Line Calibrated Not Applicable 08/28/23 1000   Line Necessity Assessed Lack of Peripheral Access 08/27/23 1000   Line Necessity Reviewed With MD Rodriguez 08/26/23 1200             Urethral Catheter (Active)   Site Assessment Clean;Skin intact 08/27/23 1658   Collection Container Standard drainage bag 08/27/23 1658   Urinary Catheter Care Tamper Evident Seal in Place;Drainage Tube Extended;Drainage Bag Not Overfilled;Drainage Tubing Properly Secured;Drainage Bag Below Bladder Level and Not on Floor;Cath Care Done with Soap & Water;Cath Care Done with CHG Wipes 08/27/23 1658   Securement Method Securing device (Describe) 08/27/23 1658   Output (mL) 300 mL 08/29/23 0615        MENTAL STATUS ON TRANSFER     Alert and oriented to person and place          CONSULTATIONS  Interventional radiology  Infectious disease  Gastroenterology    PROCEDURES  CT guided gallbladder drainage   8/27/2023    LABORATORY  Lab Results   Component Value Date    SODIUM 137 08/29/2023    POTASSIUM 3.9 08/29/2023     CHLORIDE 106 08/29/2023    CO2 23 08/29/2023    GLUCOSE 141 (H) 08/29/2023    BUN 10 08/29/2023    CREATININE 0.45 (L) 08/29/2023        Lab Results   Component Value Date    WBC 16.1 (H) 08/29/2023    HEMOGLOBIN 9.3 (L) 08/29/2023    HEMATOCRIT 28.6 (L) 08/29/2023    PLATELETCT 768 (H) 08/29/2023        Total time of the discharge process exceeds 40 minutes.

## 2023-08-29 NOTE — CONSULTS
..Date of Consultation:  8/29/2023    Patient: : Odalys Keenan  MRN: [id number]    Referring Physician: Dr. Keven Brunner     GI:WESTON Garcia     Reason for Consultation: Crohn's disease    History of Present Illness: Marcella Keenan is a 67-year-old female with past medical history of Crohn's disease and alcohol abuse originally admitted to [facility] on 8/17/2023 with suicidal ideation.  During that admission, she was evaluated by gastroenterology and found to have severe C. difficile pancolitis with WBC 19.7 with frequent watery diarrhea.  She also had metabolic encephalopathy.  She was started on oral vancomycin and Flagyl.  She underwent HIDA scan that revealed chronic cholecystitis so she was transferred to [facility] for cholecystostomy tube placement with IR.  There was also concern for some metabolic encephalopathy.    Patient underwent cholecystectomy tube placement on 8/27.  She was also seen by infectious disease on 828 who recommended continuing Flagyl and changing Zosyn to cefepime for 5 to 7 days after drain.     Currently, patient has NG tube with tube feeding, bowel management system with more than 1 L of stool past 24 hours and leukocytosis with WBC 16.1.  She is able to participate in my exam today and is alert and oriented x3.  She reports abdominal pain and tenderness with bloating and distention.  She states she has had a previous history of C. difficile and required 65 days in the ICU. She reports that she was diagnosed with Crohn's disease 15 years ago and went on azathioprine and Humira and achieved remission 8 years ago.  She has not had any flares or issues since then.  She does not remember what happened to bring her into the hospital but appears to be more oriented today than per chart review.  ID recommending transitioning to prophylactic C. difficile dosing as she has completed 10 days of therapy.    Past Medical History:   Diagnosis Date    Crohn  "disease (HCC)     HLD (hyperlipidemia)     Hypertension          No past surgical history on file.    No family history on file.    Social History     Socioeconomic History    Marital status: Single   Tobacco Use    Smoking status: Some Days     Types: Cigarettes    Smokeless tobacco: Never   Vaping Use    Vaping Use: Never used   Substance and Sexual Activity    Alcohol use: Yes     Comment: \"quite a bit\"    Drug use: Not Currently     Social Determinants of Health     Food Insecurity: No Food Insecurity (8/18/2023)    Hunger Vital Sign     Worried About Running Out of Food in the Last Year: Never true     Ran Out of Food in the Last Year: Never true   Transportation Needs: No Transportation Needs (8/18/2023)    PRAPARE - Transportation     Lack of Transportation (Medical): No     Lack of Transportation (Non-Medical): No       Review of systems:  Review of Systems   Constitutional:  Positive for malaise/fatigue. Negative for chills and fever.   HENT:  Negative for hearing loss.    Eyes:  Negative for blurred vision.   Respiratory:  Negative for cough and shortness of breath.    Cardiovascular:  Negative for chest pain and leg swelling.   Gastrointestinal:  Positive for abdominal pain and diarrhea. Negative for blood in stool, constipation, heartburn, melena, nausea and vomiting.   Genitourinary:  Negative for dysuria.   Musculoskeletal:  Negative for back pain.   Skin:  Negative for rash.   Neurological:  Positive for weakness. Negative for dizziness.   Psychiatric/Behavioral:  Negative for depression.    All other systems reviewed and are negative.        Physical Exam:  Vitals:    08/28/23 1623 08/28/23 2000 08/29/23 0426 08/29/23 0701   BP: 138/78 (!) 146/83 127/76 118/82   Pulse: (!) 115 (!) 108 (!) 116 (!) 107   Resp: 18 18 18 18   Temp: 36.9 °C (98.4 °F) 36.8 °C (98.2 °F)  37.1 °C (98.8 °F)   TempSrc: Temporal Temporal Temporal Temporal   SpO2: 96% 95% 94% 94%   Weight:  75.4 kg (166 lb 3.6 oz)     Height:    "        Physical Exam  Vitals and nursing note reviewed.   Constitutional:       General: She is not in acute distress.     Appearance: Normal appearance.   HENT:      Head: Normocephalic and atraumatic.      Right Ear: External ear normal.      Left Ear: External ear normal.      Nose:      Comments: NG tube     Mouth/Throat:      Mouth: Mucous membranes are dry.      Pharynx: Oropharynx is clear.   Eyes:      General: No scleral icterus.  Cardiovascular:      Rate and Rhythm: Regular rhythm. Tachycardia present.      Pulses: Normal pulses.      Heart sounds: Normal heart sounds.   Pulmonary:      Effort: Pulmonary effort is normal.      Breath sounds: Normal breath sounds.   Abdominal:      General: There is distension.      Tenderness: There is abdominal tenderness.      Comments: Hyperactive  Bowel management system in place   Musculoskeletal:         General: Normal range of motion.      Cervical back: Normal range of motion.   Skin:     General: Skin is warm and dry.      Capillary Refill: Capillary refill takes less than 2 seconds.      Coloration: Skin is pale.   Neurological:      Mental Status: She is alert and oriented to person, place, and time.   Psychiatric:         Mood and Affect: Mood normal.         Behavior: Behavior normal.           Labs:  Recent Labs     08/27/23  0030 08/28/23  0405 08/29/23  0745   WBC 21.0* 15.3* 16.1*   RBC 3.20* 3.01* 2.87*   HEMOGLOBIN 10.2* 9.8* 9.3*   HEMATOCRIT 29.6* 29.2* 28.6*   MCV 92.5 97.0 99.7*   MCH 31.9 32.6 32.4   MCHC 34.5 33.6 32.5   RDW 48.9 52.5* 54.6*   PLATELETCT 561* 684* 768*   MPV 11.1 10.8 10.7     Recent Labs     08/28/23  0405 08/28/23  0743 08/29/23  0745   SODIUM 138 138 137   POTASSIUM 3.4* 3.3* 3.9   CHLORIDE 109 106 106   CO2 20 22 23   GLUCOSE 125* 141* 141*   BUN 11 11 10           Recent Labs     08/27/23  0030 08/28/23  0405 08/28/23  0743 08/29/23  0745   ASTSGOT 33 24  --  19   ALTSGPT 30 22  --  16   TBILIRUBIN 0.4 0.4  --  0.3    ALKPHOSPHAT 789* 519*  --  365*   GLOBULIN 2.4 2.2  --  2.2   AMMONIA  --   --  21  --          Imaging:  DX-ABDOMEN FOR TUBE PLACEMENT  Narrative:  8/28/2023 5:43 AM    HISTORY/REASON FOR EXAM: NG Tube    TECHNIQUE/EXAM DESCRIPTION: Single AP view of the abdomen    COMPARISON:  August 21, 2023    FINDINGS:    Hazy bilateral lower lobe opacities are seen.    Pigtail catheter overlies the right abdomen. Nasogastric tube is seen, the tip lies to the left of the lumbar spine.  The bowel gas pattern in the upper abdomen appears nonspecific.    The bony structures appear age-appropriate.  Impression: 1.  Nonspecific bowel gas pattern in the upper abdomen.  2.  Nasogastric tube tip terminates overlying the expected location of the gastric body.  3.  Bilateral pulmonary edema and/or infiltrates  DX-CHEST-LIMITED (1 VIEW)  Narrative:   8/28/2023 5:43 AM    HISTORY/REASON FOR EXAM: Shortness of Breath    TECHNIQUE/EXAM DESCRIPTION:  Single AP view of the chest.    COMPARISON: August 25, 2023    FINDINGS:    Position of medical devices appears stable. The cardiac silhouette appears within normal limits.    The mediastinal contour appears within normal limits.  The central  pulmonary vasculature appears prominent and indistinct.    Bilateral lung volumes are diminished.  Diffuse scattered hazy pulmonary parenchymal opacities are seen.    No significant pleural effusions are identified.    The bony structures appear age-appropriate.  Impression: 1.  Pulmonary edema and/or infiltrates            Impressions:  C. difficile colitis  History of Crohn's disease  Diarrhea  Sepsis, improving  Alcoholic liver disease  Elevated alkaline phosphatase, improving  Paroxysmal atrial fibrillation  Cholecystitis status post IR cholecystostomy tube  Acute encephalopathy, improving  Leukocytosis  Macrocytic anemia  Thrombocytosis    MDM:  Patient is a 67-year-old female with past medical history of Crohn's disease and alcoholic liver disease  who presents with her second episode of lifetime C. difficile pancolitis.  She has been initiated on Vanco and Flagyl and also require cefepime for cholecystostomy tube placement.  ID recommending prophylactic vancomycin and cefepime through 9/2.  Steroids and Biologics were not indicated for Crohn's disease at this time given active C. difficile colitis infection.    Recommendations:  Continue management of C. difficile colitis per infectious disease and primary team, note ID recommendation to transition to prophylactic vancomycin dosing  Avoid hepatotoxins  Patient requires endoscopic evaluation of Crohn's disease, recommend waiting until episode of C. difficile colitis has resolved  Recommend no initiation of steroids or Biologics at this time given active colitis  Continue DVT prophylaxis  Monitor stool frequency and character   Further management per primary team    GI team will follow peripherally and is available for questions or concerns.    Plan of care discussed with patient, Dr. Brunner, and Dr. Corona    This note was generated using voice recognition software which has a small chance of producing errors of grammar and possibly content. I have made every reasonable attempt to find and correct any obvious errors, but expect that some may not be found prior to finalization of this note.

## 2023-08-29 NOTE — DISCHARGE PLANNING
Case Management Discharge Planning    Admission Date: 8/25/2023  GMLOS: 4.4  ALOS: 4    TRANSFER BACK PATIENT    Referring Facility: [facility]  Reason for Transfer: Higher level of care for abdominal pain, IR Consult    Signed Repatriation/Transfer Back Agreement saved in .    Once this patient has received the requested service or the patient no longer requires tertiary level of care from formerly [facility] and is stable to transfer back to referring facility, we can facilitate a transfer back. Please contact the [facility] at [phone] to coordinate this transfer request.

## 2023-08-29 NOTE — CARE PLAN
Problem: Knowledge Deficit - Standard  Goal: Patient and family/care givers will demonstrate understanding of plan of care, disease process/condition, diagnostic tests and medications  Outcome: Progressing     Problem: Hemodynamics  Goal: Patient's hemodynamics, fluid balance and neurologic status will be stable or improve  Outcome: Progressing     Problem: Fluid Volume  Goal: Fluid volume balance will be maintained  Outcome: Progressing     Problem: Urinary - Renal Perfusion  Goal: Ability to achieve and maintain adequate renal perfusion and functioning will improve  Outcome: Progressing     Problem: Fall Risk  Goal: Patient will remain free from falls  Outcome: Progressing     Problem: Pain - Standard  Goal: Alleviation of pain or a reduction in pain to the patient’s comfort goal  Outcome: Progressing   The patient is Stable - Low risk of patient condition declining or worsening    Shift Goals  Clinical Goals: monitor respiratory status, hemodynamics  Patient Goals: KUN  Family Goals: updates    Progress made toward(s) clinical / shift goals:  Progressing    Patient is not progressing towards the following goals:

## 2023-08-29 NOTE — DISCHARGE PLANNING
Patient came from [facility] for higher level of care. Patient will be returning when transportation and bed available.    Case Management Discharge Planning    Admission Date: 8/25/2023  GMLOS: 4.4  ALOS: 4    6-Clicks ADL Score: 8  6-Clicks Mobility Score: 6  PT and/or OT Eval ordered: No  Post-acute Referrals Ordered: Yes  Post-acute Choice Obtained: No  Has referral(s) been sent to post-acute provider:  Yes-[facility]     Anticipated Discharge Dispo: Discharge Disposition: D/T to institution not listed w/planned hosp IP readmit (95)    DME Needed: No    Action(s) Taken: DC Assessment Complete (See below)    Escalations Completed: Pending Discharge Destination-waiting acceptance from [facility] Clear: Yes  To return to [facility] for further care.     Next Steps: Physician requested [facility] to arrange for transfer back to [facility]. Arrangements being made. Patient agrees to transfer back.     Barriers to Discharge: Pending Placement and Transportation to [facility]    Is the patient up for discharge tomorrow: No     Transfer is expected today when arranged by [facility]

## 2023-08-29 NOTE — DIETARY
Nutrition Services Brief Update:    Problem: Nutritional:  Goal: Nutrition support tolerated and meeting greater than 85% of estimated needs  Outcome: Goal not met     TF clamped per flow sheet due to scheduled procedures. RD messaged RN who states TF has now resumed, refeeding labs WNL at this time.     RD following

## 2023-08-29 NOTE — PROGRESS NOTES
Assumed care of patient after receiving report from Wenceslao night shift RN. Patient is currently Not Alert or Oriented on 3LNC. Bed locked and in lowest position. Call light within reach.

## 2023-08-30 NOTE — PROGRESS NOTES
Bedside report received from day RN, pt care assumed, assessment completed. Pt is A&O3 confused in conversation, pain 10/10 received medication see MAR, ST on the monitor. Updated on POC, questions answered. Bed in lowest, locked position, treaded socks on, call light and belongings within reach. Fall precautions in place.     Report called to [place], notified by RTOC that [facility] transport ETA 2015 this evening.

## 2023-08-30 NOTE — PROGRESS NOTES
Monitor Summary  Rhythm: Sinus Tachycardia  Rate: 100-110s  Ectopy: PVCs  .13 / .08 / .43

## 2023-09-01 LAB
BACTERIA FLD AEROBE CULT: ABNORMAL
BACTERIA FLD AEROBE CULT: ABNORMAL
GRAM STN SPEC: ABNORMAL
SIGNIFICANT IND 70042: ABNORMAL
SITE SITE: ABNORMAL
SOURCE SOURCE: ABNORMAL

## 2023-09-08 ENCOUNTER — HOSPITAL ENCOUNTER (EMERGENCY)
Facility: MEDICAL CENTER | Age: 68
End: 2023-09-08
Attending: EMERGENCY MEDICINE
Payer: MEDICARE

## 2023-09-08 VITALS
BODY MASS INDEX: 19.7 KG/M2 | HEIGHT: 68 IN | WEIGHT: 130 LBS | OXYGEN SATURATION: 96 % | RESPIRATION RATE: 16 BRPM | DIASTOLIC BLOOD PRESSURE: 64 MMHG | HEART RATE: 108 BPM | SYSTOLIC BLOOD PRESSURE: 138 MMHG | TEMPERATURE: 98 F

## 2023-09-08 DIAGNOSIS — K81.1 CHRONIC CHOLECYSTITIS: ICD-10-CM

## 2023-09-08 LAB
ALBUMIN SERPL BCP-MCNC: 4.3 G/DL (ref 3.2–4.9)
ALBUMIN/GLOB SERPL: 1.1 G/DL
ALP SERPL-CCNC: 394 U/L (ref 30–99)
ALT SERPL-CCNC: 24 U/L (ref 2–50)
ANION GAP SERPL CALC-SCNC: 19 MMOL/L (ref 7–16)
AST SERPL-CCNC: 54 U/L (ref 12–45)
BASOPHILS # BLD AUTO: 1.2 % (ref 0–1.8)
BASOPHILS # BLD: 0.14 K/UL (ref 0–0.12)
BILIRUB SERPL-MCNC: 0.4 MG/DL (ref 0.1–1.5)
BUN SERPL-MCNC: 14 MG/DL (ref 8–22)
CALCIUM ALBUM COR SERPL-MCNC: 9.7 MG/DL (ref 8.5–10.5)
CALCIUM SERPL-MCNC: 9.9 MG/DL (ref 8.5–10.5)
CHLORIDE SERPL-SCNC: 97 MMOL/L (ref 96–112)
CO2 SERPL-SCNC: 18 MMOL/L (ref 20–33)
CREAT SERPL-MCNC: 0.57 MG/DL (ref 0.5–1.4)
EOSINOPHIL # BLD AUTO: 0.24 K/UL (ref 0–0.51)
EOSINOPHIL NFR BLD: 2 % (ref 0–6.9)
ERYTHROCYTE [DISTWIDTH] IN BLOOD BY AUTOMATED COUNT: 48.9 FL (ref 35.9–50)
GFR SERPLBLD CREATININE-BSD FMLA CKD-EPI: 100 ML/MIN/1.73 M 2
GLOBULIN SER CALC-MCNC: 3.9 G/DL (ref 1.9–3.5)
GLUCOSE SERPL-MCNC: 95 MG/DL (ref 65–99)
HCT VFR BLD AUTO: 29.5 % (ref 37–47)
HGB BLD-MCNC: 9.8 G/DL (ref 12–16)
IMM GRANULOCYTES # BLD AUTO: 0.1 K/UL (ref 0–0.11)
IMM GRANULOCYTES NFR BLD AUTO: 0.8 % (ref 0–0.9)
LIPASE SERPL-CCNC: 111 U/L (ref 11–82)
LYMPHOCYTES # BLD AUTO: 1.19 K/UL (ref 1–4.8)
LYMPHOCYTES NFR BLD: 10 % (ref 22–41)
MCH RBC QN AUTO: 32 PG (ref 27–33)
MCHC RBC AUTO-ENTMCNC: 33.2 G/DL (ref 32.2–35.5)
MCV RBC AUTO: 96.4 FL (ref 81.4–97.8)
MONOCYTES # BLD AUTO: 0.91 K/UL (ref 0–0.85)
MONOCYTES NFR BLD AUTO: 7.6 % (ref 0–13.4)
NEUTROPHILS # BLD AUTO: 9.32 K/UL (ref 1.82–7.42)
NEUTROPHILS NFR BLD: 78.4 % (ref 44–72)
NRBC # BLD AUTO: 0 K/UL
NRBC BLD-RTO: 0 /100 WBC (ref 0–0.2)
PLATELET # BLD AUTO: 759 K/UL (ref 164–446)
PMV BLD AUTO: 9.7 FL (ref 9–12.9)
POTASSIUM SERPL-SCNC: 4.2 MMOL/L (ref 3.6–5.5)
PROT SERPL-MCNC: 8.2 G/DL (ref 6–8.2)
RBC # BLD AUTO: 3.06 M/UL (ref 4.2–5.4)
SODIUM SERPL-SCNC: 134 MMOL/L (ref 135–145)
WBC # BLD AUTO: 11.9 K/UL (ref 4.8–10.8)

## 2023-09-08 PROCEDURE — 36415 COLL VENOUS BLD VENIPUNCTURE: CPT

## 2023-09-08 PROCEDURE — 99285 EMERGENCY DEPT VISIT HI MDM: CPT

## 2023-09-08 PROCEDURE — 96374 THER/PROPH/DIAG INJ IV PUSH: CPT

## 2023-09-08 PROCEDURE — 85025 COMPLETE CBC W/AUTO DIFF WBC: CPT

## 2023-09-08 PROCEDURE — 700111 HCHG RX REV CODE 636 W/ 250 OVERRIDE (IP): Performed by: EMERGENCY MEDICINE

## 2023-09-08 PROCEDURE — 700105 HCHG RX REV CODE 258: Performed by: EMERGENCY MEDICINE

## 2023-09-08 PROCEDURE — 83690 ASSAY OF LIPASE: CPT

## 2023-09-08 PROCEDURE — 80053 COMPREHEN METABOLIC PANEL: CPT

## 2023-09-08 RX ORDER — SODIUM CHLORIDE, SODIUM LACTATE, POTASSIUM CHLORIDE, CALCIUM CHLORIDE 600; 310; 30; 20 MG/100ML; MG/100ML; MG/100ML; MG/100ML
1000 INJECTION, SOLUTION INTRAVENOUS ONCE
Status: COMPLETED | OUTPATIENT
Start: 2023-09-08 | End: 2023-09-08

## 2023-09-08 RX ORDER — HYDROMORPHONE HYDROCHLORIDE 1 MG/ML
1 INJECTION, SOLUTION INTRAMUSCULAR; INTRAVENOUS; SUBCUTANEOUS ONCE
Status: COMPLETED | OUTPATIENT
Start: 2023-09-08 | End: 2023-09-08

## 2023-09-08 RX ORDER — HYDROMORPHONE HYDROCHLORIDE 2 MG/1
2-4 TABLET ORAL 2 TIMES DAILY PRN
Qty: 14 TABLET | Refills: 0 | Status: ON HOLD | OUTPATIENT
Start: 2023-09-08 | End: 2023-09-14

## 2023-09-08 RX ADMIN — SODIUM CHLORIDE, POTASSIUM CHLORIDE, SODIUM LACTATE AND CALCIUM CHLORIDE 1000 ML: 600; 310; 30; 20 INJECTION, SOLUTION INTRAVENOUS at 18:35

## 2023-09-08 RX ADMIN — HYDROMORPHONE HYDROCHLORIDE 1 MG: 1 INJECTION, SOLUTION INTRAMUSCULAR; INTRAVENOUS; SUBCUTANEOUS at 17:24

## 2023-09-08 ASSESSMENT — FIBROSIS 4 INDEX: FIB4 SCORE: 0.43

## 2023-09-08 NOTE — ED TRIAGE NOTES
"Chief Complaint   Patient presents with    Abdominal Pain     RUQ pain progressively getting worse last x3 days, admit to [facility] on 9/6 with gallbladder drain insertion, pain at insertion site, [facility] recommended no surgery for cholelithiasis          BIB [facility] for above complaint. Hx DM1. Pt given fentanyl 100mcg for 10/10 ABD pain. EMS vitals 119/73 HR 97 100 RA gcs 15 glucose 126    ABD pain protocols ordered. Labs drawn.      BP (!) 159/77   Pulse 96   Temp 36.8 °C (98.3 °F) (Temporal)   Resp 16   Ht 1.727 m (5' 8\")   Wt 59 kg (130 lb)   SpO2 100%   BMI 19.77 kg/m²      "

## 2023-09-08 NOTE — ED PROVIDER NOTES
ED Provider Note    CHIEF COMPLAINT  Chief Complaint   Patient presents with    Abdominal Pain     RUQ pain progressively getting worse last x3 days, admit to [facility] on 9/6 with gallbladder drain insertion, pain at insertion site, [facility] recommended no surgery for cholelithiasis         EXTERNAL RECORDS REVIEWED  Patient's multiple admissions, to both [facility] and here for both alcohol abuse and patient's recent diagnosis of chronic cholecystitis    HPI/ROS      Odalys Keenan is a 67 y.o. female who presents with chief complaint of right upper quadrant abdominal pain.  Of note patient was recently discharged from our hospital after being transferred from [facility], 3 days prior, patient was found to have an ultrasound consistent with chronic cholecystitis, and had an IR drain placed as concurrent sepsis was being considered and she had new onset afib. She was started on IV antibiotics which were discontinued on 9/2/2023.  Patient has a notable history of severe alcohol abuse, A-fib, C. Difficile.  Pt reports that she is having pain at the drainage site, this been present since she was discharged and is also having paroxysms of pain that occur at this area as well that are without any provoking or relieving factors, they simply come and go.   Pt was seen 2 days prior at [facility] for identical complaint and had a CT at that time which showed correct basement of the cholecystomy tube, with a contracted gallbladder and no secondary evidence of severe infection; her white count was 15 at that time.      PAST MEDICAL HISTORY   has a past medical history of Crohn disease (HCC), HLD (hyperlipidemia), and Hypertension.    SURGICAL HISTORY  patient denies any surgical history    FAMILY HISTORY  History reviewed. No pertinent family history.    SOCIAL HISTORY  Social History     Tobacco Use    Smoking status: Some Days     Types: Cigarettes    Smokeless tobacco: Never   Vaping Use    Vaping Use: Never used  "  Substance and Sexual Activity    Alcohol use: Yes     Comment: \"quite a bit\"    Drug use: Not Currently    Sexual activity: Not on file       CURRENT MEDICATIONS  Home Medications       Reviewed by Christy Chapman R.N. (Registered Nurse) on 09/08/23 at 1534  Med List Status: Partial     Medication Last Dose Status   acetaminophen (TYLENOL) 325 MG Tab  Active   gabapentin (NEURONTIN) 100 MG Cap  Active   HYDROmorphone (DILAUDID) 2 MG Tab  Active   Icy Hot extra-strength 10-30 % Cream topical cream  Active   levETIRAcetam (KEPPRA) 500 MG Tab  Active   lidocaine (LIDODERM) 5 % Patch  Active   metoprolol SR (TOPROL XL) 25 MG TABLET SR 24 HR  Active   mirtazapine (REMERON) 15 MG Tab  Active   Naloxone (NARCAN) 4 MG/0.1ML Liquid  Active   ondansetron (ZOFRAN) 8 MG Tab  Active                    ALLERGIES  Allergies   Allergen Reactions    Nsaids Vomiting       PHYSICAL EXAM  VITAL SIGNS: BP (!) 159/77   Pulse 96   Temp 36.8 °C (98.3 °F) (Temporal)   Resp 16   Ht 1.727 m (5' 8\")   Wt 59 kg (130 lb)   SpO2 100%   BMI 19.77 kg/m²    Physical Exam  Constitutional:       Appearance: She is well-developed.   HENT:      Head: Normocephalic and atraumatic.   Eyes:      Pupils: Pupils are equal, round, and reactive to light.   Cardiovascular:      Rate and Rhythm: Normal rate and regular rhythm.   Pulmonary:      Effort: Pulmonary effort is normal. No accessory muscle usage or respiratory distress.      Breath sounds: Normal breath sounds.   Abdominal:      General: Bowel sounds are normal.      Palpations: Abdomen is soft. There is no mass.      Tenderness: There is abdominal tenderness in the right upper quadrant.      Comments: Drain in place, there is no surrounding erythema, swelling or significant drainage from around the tube itself.  There is some tenderness around the tube without guarding or rebound   Musculoskeletal:         General: Normal range of motion.   Skin:     General: Skin is warm.      Capillary " Refill: Capillary refill takes less than 2 seconds.   Neurological:      General: No focal deficit present.      Mental Status: She is alert.   Psychiatric:         Mood and Affect: Mood normal. Mood is not anxious.           DIAGNOSTIC STUDIES / PROCEDURES      LABS  Results for orders placed or performed during the hospital encounter of 09/08/23   Complete Metabolic Panel   Result Value Ref Range    Sodium 134 (L) 135 - 145 mmol/L    Potassium 4.2 3.6 - 5.5 mmol/L    Chloride 97 96 - 112 mmol/L    Co2 18 (L) 20 - 33 mmol/L    Anion Gap 19.0 (H) 7.0 - 16.0    Glucose 95 65 - 99 mg/dL    Bun 14 8 - 22 mg/dL    Creatinine 0.57 0.50 - 1.40 mg/dL    Calcium 9.9 8.5 - 10.5 mg/dL    Correct Calcium 9.7 8.5 - 10.5 mg/dL    AST(SGOT) 54 (H) 12 - 45 U/L    ALT(SGPT) 24 2 - 50 U/L    Alkaline Phosphatase 394 (H) 30 - 99 U/L    Total Bilirubin 0.4 0.1 - 1.5 mg/dL    Albumin 4.3 3.2 - 4.9 g/dL    Total Protein 8.2 6.0 - 8.2 g/dL    Globulin 3.9 (H) 1.9 - 3.5 g/dL    A-G Ratio 1.1 g/dL   Lipase   Result Value Ref Range    Lipase 111 (H) 11 - 82 U/L   CBC WITH DIFFERENTIAL   Result Value Ref Range    WBC 11.9 (H) 4.8 - 10.8 K/uL    RBC 3.06 (L) 4.20 - 5.40 M/uL    Hemoglobin 9.8 (L) 12.0 - 16.0 g/dL    Hematocrit 29.5 (L) 37.0 - 47.0 %    MCV 96.4 81.4 - 97.8 fL    MCH 32.0 27.0 - 33.0 pg    MCHC 33.2 32.2 - 35.5 g/dL    RDW 48.9 35.9 - 50.0 fL    Platelet Count 759 (H) 164 - 446 K/uL    MPV 9.7 9.0 - 12.9 fL    Neutrophils-Polys 78.40 (H) 44.00 - 72.00 %    Lymphocytes 10.00 (L) 22.00 - 41.00 %    Monocytes 7.60 0.00 - 13.40 %    Eosinophils 2.00 0.00 - 6.90 %    Basophils 1.20 0.00 - 1.80 %    Immature Granulocytes 0.80 0.00 - 0.90 %    Nucleated RBC 0.00 0.00 - 0.20 /100 WBC    Neutrophils (Absolute) 9.32 (H) 1.82 - 7.42 K/uL    Lymphs (Absolute) 1.19 1.00 - 4.80 K/uL    Monos (Absolute) 0.91 (H) 0.00 - 0.85 K/uL    Eos (Absolute) 0.24 0.00 - 0.51 K/uL    Baso (Absolute) 0.14 (H) 0.00 - 0.12 K/uL    Immature Granulocytes  (abs) 0.10 0.00 - 0.11 K/uL    NRBC (Absolute) 0.00 K/uL   ESTIMATED GFR   Result Value Ref Range    GFR (CKD-EPI) 100 >60 mL/min/1.73 m 2         COURSE & MEDICAL DECISION MAKING      INITIAL ASSESSMENT, COURSE AND PLAN  Care Narrative: Well-appearing patient here with ongoing pain related to her chronic cholecystitis.  She appears very well.  Recent CT within the last few days which failed to reveal any overtly concerning findings.  Patient reports pain is chronic and unchanged but simply unmanaged.  She had been on Dilaudid but ran out of this.  She has a primary care physician who she can schedule an appointment with as an outpatient.  Patient does not have any associated fevers.  The SHMUEL site is very clean without any evidence of surrounding infection.  We will send basic labs for further restratification and ensure patient does not have a worsening white count.  Patient's white count is actually significantly improved from prior.  At this point given the patient has had multiple CTs within the last few months and one within the last 48 hours I do not believe that repeating the CT is currently necessary especially given patient's reassuring exam.  She is mildly tachycardic, she was given IV fluids and this improved.  Patient denies any associated chest pain or shortness of breath, pleuritic pain or symptoms to suggest pulmonary embolus.  I have offered to keep patient here, check further labs and even repeat imaging, however patient would like to return home with some pain control.  I believe this is reasonable.  I did discuss the case with general surgery to see if there would be any appetite for cholecystectomy, they report that she has to wait at least till October until the inflammation from the chronic cholecystitis improves.  I have placed a referral for general surgery and given patient follow-up with him.  Home with p.o. Dilaudid.  Patient without any evidence of [place].         DISPOSITION AND DISCUSSIONS      Escalation of care considered, and ultimately not performed: Repeat CT imaging deferred given patient has had multiple CTs in the past, this decision was made using shared decision making with patient        FINAL DIAGNOSIS  1. Chronic cholecystitis

## 2023-09-09 ENCOUNTER — HOSPITAL ENCOUNTER (INPATIENT)
Facility: MEDICAL CENTER | Age: 68
LOS: 4 days | DRG: 896 | End: 2023-09-14
Attending: EMERGENCY MEDICINE | Admitting: HOSPITALIST
Payer: MEDICARE

## 2023-09-09 ENCOUNTER — APPOINTMENT (OUTPATIENT)
Dept: RADIOLOGY | Facility: MEDICAL CENTER | Age: 68
DRG: 896 | End: 2023-09-09
Attending: EMERGENCY MEDICINE
Payer: MEDICARE

## 2023-09-09 DIAGNOSIS — K81.1 CHRONIC CHOLECYSTITIS: ICD-10-CM

## 2023-09-09 DIAGNOSIS — K85.90 ACUTE PANCREATITIS, UNSPECIFIED COMPLICATION STATUS, UNSPECIFIED PANCREATITIS TYPE: ICD-10-CM

## 2023-09-09 DIAGNOSIS — F10.939 ALCOHOL WITHDRAWAL SYNDROME WITH COMPLICATION (HCC): ICD-10-CM

## 2023-09-09 DIAGNOSIS — K81.0 ACUTE CHOLECYSTITIS: ICD-10-CM

## 2023-09-09 PROBLEM — D64.9 ANEMIA: Status: ACTIVE | Noted: 2023-09-09

## 2023-09-09 PROBLEM — R10.9 ABDOMINAL PAIN: Status: ACTIVE | Noted: 2023-09-09

## 2023-09-09 LAB
ALBUMIN SERPL BCP-MCNC: 3.6 G/DL (ref 3.2–4.9)
ALBUMIN/GLOB SERPL: 1.3 G/DL
ALP SERPL-CCNC: 330 U/L (ref 30–99)
ALT SERPL-CCNC: 18 U/L (ref 2–50)
AMMONIA PLAS-SCNC: 15 UMOL/L (ref 11–45)
ANION GAP SERPL CALC-SCNC: 10 MMOL/L (ref 7–16)
AST SERPL-CCNC: 20 U/L (ref 12–45)
BASOPHILS # BLD AUTO: 0.7 % (ref 0–1.8)
BASOPHILS # BLD: 0.1 K/UL (ref 0–0.12)
BILIRUB SERPL-MCNC: 0.4 MG/DL (ref 0.1–1.5)
BUN SERPL-MCNC: 13 MG/DL (ref 8–22)
CALCIUM ALBUM COR SERPL-MCNC: 9.2 MG/DL (ref 8.5–10.5)
CALCIUM SERPL-MCNC: 8.9 MG/DL (ref 8.5–10.5)
CHLORIDE SERPL-SCNC: 102 MMOL/L (ref 96–112)
CO2 SERPL-SCNC: 22 MMOL/L (ref 20–33)
CREAT SERPL-MCNC: 0.57 MG/DL (ref 0.5–1.4)
EKG IMPRESSION: NORMAL
EOSINOPHIL # BLD AUTO: 0.02 K/UL (ref 0–0.51)
EOSINOPHIL NFR BLD: 0.1 % (ref 0–6.9)
ERYTHROCYTE [DISTWIDTH] IN BLOOD BY AUTOMATED COUNT: 48.8 FL (ref 35.9–50)
EST. AVERAGE GLUCOSE BLD GHB EST-MCNC: 100 MG/DL
ETHANOL BLD-MCNC: <10.1 MG/DL
FERRITIN SERPL-MCNC: 737 NG/ML (ref 10–291)
GFR SERPLBLD CREATININE-BSD FMLA CKD-EPI: 100 ML/MIN/1.73 M 2
GLOBULIN SER CALC-MCNC: 2.8 G/DL (ref 1.9–3.5)
GLUCOSE SERPL-MCNC: 129 MG/DL (ref 65–99)
GRAM STN SPEC: NORMAL
HBA1C MFR BLD: 5.1 % (ref 4–5.6)
HCT VFR BLD AUTO: 28.4 % (ref 37–47)
HGB BLD-MCNC: 9.3 G/DL (ref 12–16)
HGB RETIC QN AUTO: 31 PG/CELL (ref 29–35)
IMM GRANULOCYTES # BLD AUTO: 0.1 K/UL (ref 0–0.11)
IMM GRANULOCYTES NFR BLD AUTO: 0.7 % (ref 0–0.9)
IMM RETICS NFR: 15.6 % (ref 2.6–16.1)
INR PPP: 1.12 (ref 0.87–1.13)
IRON SATN MFR SERPL: 19 % (ref 15–55)
IRON SERPL-MCNC: 50 UG/DL (ref 40–170)
LIPASE SERPL-CCNC: 32 U/L (ref 11–82)
LYMPHOCYTES # BLD AUTO: 0.71 K/UL (ref 1–4.8)
LYMPHOCYTES NFR BLD: 5.2 % (ref 22–41)
MAGNESIUM SERPL-MCNC: 1.3 MG/DL (ref 1.5–2.5)
MCH RBC QN AUTO: 31.6 PG (ref 27–33)
MCHC RBC AUTO-ENTMCNC: 32.7 G/DL (ref 32.2–35.5)
MCV RBC AUTO: 96.6 FL (ref 81.4–97.8)
MONOCYTES # BLD AUTO: 1.12 K/UL (ref 0–0.85)
MONOCYTES NFR BLD AUTO: 8.2 % (ref 0–13.4)
NEUTROPHILS # BLD AUTO: 11.63 K/UL (ref 1.82–7.42)
NEUTROPHILS NFR BLD: 85.1 % (ref 44–72)
NRBC # BLD AUTO: 0 K/UL
NRBC BLD-RTO: 0 /100 WBC (ref 0–0.2)
PLATELET # BLD AUTO: 689 K/UL (ref 164–446)
PMV BLD AUTO: 9.9 FL (ref 9–12.9)
POTASSIUM SERPL-SCNC: 4.1 MMOL/L (ref 3.6–5.5)
PROT SERPL-MCNC: 6.4 G/DL (ref 6–8.2)
PROTHROMBIN TIME: 14.6 SEC (ref 12–14.6)
RBC # BLD AUTO: 2.94 M/UL (ref 4.2–5.4)
RETICS # AUTO: 0.08 M/UL (ref 0.04–0.12)
RETICS/RBC NFR: 2.7 % (ref 0.8–2.6)
SIGNIFICANT IND 70042: NORMAL
SITE SITE: NORMAL
SODIUM SERPL-SCNC: 134 MMOL/L (ref 135–145)
SOURCE SOURCE: NORMAL
TIBC SERPL-MCNC: 269 UG/DL (ref 250–450)
UIBC SERPL-MCNC: 219 UG/DL (ref 110–370)
VIT B12 SERPL-MCNC: 1252 PG/ML (ref 211–911)
WBC # BLD AUTO: 13.7 K/UL (ref 4.8–10.8)

## 2023-09-09 PROCEDURE — A9270 NON-COVERED ITEM OR SERVICE: HCPCS | Performed by: HOSPITALIST

## 2023-09-09 PROCEDURE — 83550 IRON BINDING TEST: CPT

## 2023-09-09 PROCEDURE — 83735 ASSAY OF MAGNESIUM: CPT

## 2023-09-09 PROCEDURE — 96368 THER/DIAG CONCURRENT INF: CPT

## 2023-09-09 PROCEDURE — 700105 HCHG RX REV CODE 258: Performed by: HOSPITALIST

## 2023-09-09 PROCEDURE — 99223 1ST HOSP IP/OBS HIGH 75: CPT | Mod: AI | Performed by: HOSPITALIST

## 2023-09-09 PROCEDURE — 82140 ASSAY OF AMMONIA: CPT

## 2023-09-09 PROCEDURE — 96365 THER/PROPH/DIAG IV INF INIT: CPT

## 2023-09-09 PROCEDURE — 80053 COMPREHEN METABOLIC PANEL: CPT

## 2023-09-09 PROCEDURE — 93010 ELECTROCARDIOGRAM REPORT: CPT | Performed by: INTERNAL MEDICINE

## 2023-09-09 PROCEDURE — 85610 PROTHROMBIN TIME: CPT

## 2023-09-09 PROCEDURE — G0378 HOSPITAL OBSERVATION PER HR: HCPCS

## 2023-09-09 PROCEDURE — 85046 RETICYTE/HGB CONCENTRATE: CPT

## 2023-09-09 PROCEDURE — 96366 THER/PROPH/DIAG IV INF ADDON: CPT

## 2023-09-09 PROCEDURE — 82977 ASSAY OF GGT: CPT

## 2023-09-09 PROCEDURE — 87070 CULTURE OTHR SPECIMN AEROBIC: CPT

## 2023-09-09 PROCEDURE — 93005 ELECTROCARDIOGRAM TRACING: CPT | Performed by: HOSPITALIST

## 2023-09-09 PROCEDURE — 85025 COMPLETE CBC W/AUTO DIFF WBC: CPT

## 2023-09-09 PROCEDURE — 99285 EMERGENCY DEPT VISIT HI MDM: CPT

## 2023-09-09 PROCEDURE — 36415 COLL VENOUS BLD VENIPUNCTURE: CPT

## 2023-09-09 PROCEDURE — 87205 SMEAR GRAM STAIN: CPT

## 2023-09-09 PROCEDURE — 96375 TX/PRO/DX INJ NEW DRUG ADDON: CPT

## 2023-09-09 PROCEDURE — 76705 ECHO EXAM OF ABDOMEN: CPT

## 2023-09-09 PROCEDURE — 700111 HCHG RX REV CODE 636 W/ 250 OVERRIDE (IP): Performed by: EMERGENCY MEDICINE

## 2023-09-09 PROCEDURE — 700101 HCHG RX REV CODE 250: Performed by: HOSPITALIST

## 2023-09-09 PROCEDURE — 82077 ASSAY SPEC XCP UR&BREATH IA: CPT

## 2023-09-09 PROCEDURE — 700102 HCHG RX REV CODE 250 W/ 637 OVERRIDE(OP): Performed by: HOSPITALIST

## 2023-09-09 PROCEDURE — 83540 ASSAY OF IRON: CPT

## 2023-09-09 PROCEDURE — 700111 HCHG RX REV CODE 636 W/ 250 OVERRIDE (IP): Mod: JZ | Performed by: HOSPITALIST

## 2023-09-09 PROCEDURE — 96367 TX/PROPH/DG ADDL SEQ IV INF: CPT

## 2023-09-09 PROCEDURE — 99222 1ST HOSP IP/OBS MODERATE 55: CPT | Performed by: SURGERY

## 2023-09-09 PROCEDURE — 96376 TX/PRO/DX INJ SAME DRUG ADON: CPT

## 2023-09-09 PROCEDURE — 83690 ASSAY OF LIPASE: CPT

## 2023-09-09 PROCEDURE — 82728 ASSAY OF FERRITIN: CPT

## 2023-09-09 PROCEDURE — 700105 HCHG RX REV CODE 258: Performed by: EMERGENCY MEDICINE

## 2023-09-09 PROCEDURE — 83036 HEMOGLOBIN GLYCOSYLATED A1C: CPT

## 2023-09-09 PROCEDURE — 82607 VITAMIN B-12: CPT

## 2023-09-09 RX ORDER — GAUZE BANDAGE 2" X 2"
100 BANDAGE TOPICAL DAILY
Status: COMPLETED | OUTPATIENT
Start: 2023-09-10 | End: 2023-09-13

## 2023-09-09 RX ORDER — HYDRALAZINE HYDROCHLORIDE 20 MG/ML
10 INJECTION INTRAMUSCULAR; INTRAVENOUS EVERY 4 HOURS PRN
Status: DISCONTINUED | OUTPATIENT
Start: 2023-09-09 | End: 2023-09-14 | Stop reason: HOSPADM

## 2023-09-09 RX ORDER — METRONIDAZOLE 500 MG/100ML
500 INJECTION, SOLUTION INTRAVENOUS EVERY 12 HOURS
Status: DISCONTINUED | OUTPATIENT
Start: 2023-09-09 | End: 2023-09-10

## 2023-09-09 RX ORDER — SODIUM CHLORIDE 9 MG/ML
INJECTION, SOLUTION INTRAVENOUS CONTINUOUS
Status: DISCONTINUED | OUTPATIENT
Start: 2023-09-09 | End: 2023-09-13

## 2023-09-09 RX ORDER — OXYCODONE HYDROCHLORIDE 5 MG/1
5 TABLET ORAL
Status: DISCONTINUED | OUTPATIENT
Start: 2023-09-09 | End: 2023-09-12

## 2023-09-09 RX ORDER — MORPHINE SULFATE 4 MG/ML
4 INJECTION INTRAVENOUS ONCE
Status: COMPLETED | OUTPATIENT
Start: 2023-09-09 | End: 2023-09-09

## 2023-09-09 RX ORDER — LORAZEPAM 2 MG/ML
1.5 INJECTION INTRAMUSCULAR
Status: DISCONTINUED | OUTPATIENT
Start: 2023-09-09 | End: 2023-09-13

## 2023-09-09 RX ORDER — METOPROLOL SUCCINATE 25 MG/1
25 TABLET, EXTENDED RELEASE ORAL DAILY
Status: DISCONTINUED | OUTPATIENT
Start: 2023-09-09 | End: 2023-09-14 | Stop reason: HOSPADM

## 2023-09-09 RX ORDER — AMOXICILLIN 250 MG
2 CAPSULE ORAL 2 TIMES DAILY
Status: DISCONTINUED | OUTPATIENT
Start: 2023-09-09 | End: 2023-09-10

## 2023-09-09 RX ORDER — POLYETHYLENE GLYCOL 3350 17 G/17G
1 POWDER, FOR SOLUTION ORAL
Status: DISCONTINUED | OUTPATIENT
Start: 2023-09-09 | End: 2023-09-10

## 2023-09-09 RX ORDER — SODIUM CHLORIDE 9 MG/ML
1000 INJECTION, SOLUTION INTRAVENOUS ONCE
Status: COMPLETED | OUTPATIENT
Start: 2023-09-09 | End: 2023-09-09

## 2023-09-09 RX ORDER — LORAZEPAM 1 MG/1
1 TABLET ORAL EVERY 4 HOURS PRN
Status: DISCONTINUED | OUTPATIENT
Start: 2023-09-09 | End: 2023-09-13

## 2023-09-09 RX ORDER — FOLIC ACID 1 MG/1
1 TABLET ORAL DAILY
Status: COMPLETED | OUTPATIENT
Start: 2023-09-10 | End: 2023-09-13

## 2023-09-09 RX ORDER — LEVETIRACETAM 500 MG/1
500 TABLET ORAL EVERY 12 HOURS
Status: DISCONTINUED | OUTPATIENT
Start: 2023-09-09 | End: 2023-09-14 | Stop reason: HOSPADM

## 2023-09-09 RX ORDER — ROSUVASTATIN CALCIUM 20 MG/1
20 TABLET, COATED ORAL DAILY
COMMUNITY

## 2023-09-09 RX ORDER — HYDROMORPHONE HYDROCHLORIDE 1 MG/ML
0.25 INJECTION, SOLUTION INTRAMUSCULAR; INTRAVENOUS; SUBCUTANEOUS
Status: DISCONTINUED | OUTPATIENT
Start: 2023-09-09 | End: 2023-09-12

## 2023-09-09 RX ORDER — ROSUVASTATIN CALCIUM 5 MG/1
20 TABLET, COATED ORAL EVERY EVENING
Status: DISCONTINUED | OUTPATIENT
Start: 2023-09-09 | End: 2023-09-14 | Stop reason: HOSPADM

## 2023-09-09 RX ORDER — GABAPENTIN 100 MG/1
100 CAPSULE ORAL 3 TIMES DAILY
Status: DISCONTINUED | OUTPATIENT
Start: 2023-09-09 | End: 2023-09-14 | Stop reason: HOSPADM

## 2023-09-09 RX ORDER — ONDANSETRON 2 MG/ML
4 INJECTION INTRAMUSCULAR; INTRAVENOUS ONCE
Status: COMPLETED | OUTPATIENT
Start: 2023-09-09 | End: 2023-09-09

## 2023-09-09 RX ORDER — LORAZEPAM 2 MG/ML
2 INJECTION INTRAMUSCULAR
Status: DISCONTINUED | OUTPATIENT
Start: 2023-09-09 | End: 2023-09-13

## 2023-09-09 RX ORDER — METRONIDAZOLE 500 MG/100ML
500 INJECTION, SOLUTION INTRAVENOUS ONCE
Status: COMPLETED | OUTPATIENT
Start: 2023-09-09 | End: 2023-09-09

## 2023-09-09 RX ORDER — OMEPRAZOLE 40 MG/1
40 CAPSULE, DELAYED RELEASE ORAL DAILY
COMMUNITY

## 2023-09-09 RX ORDER — LORAZEPAM 2 MG/1
2 TABLET ORAL
Status: DISCONTINUED | OUTPATIENT
Start: 2023-09-09 | End: 2023-09-13

## 2023-09-09 RX ORDER — MIRTAZAPINE 15 MG/1
30 TABLET, FILM COATED ORAL
Status: DISCONTINUED | OUTPATIENT
Start: 2023-09-09 | End: 2023-09-14 | Stop reason: HOSPADM

## 2023-09-09 RX ORDER — LORAZEPAM 2 MG/1
4 TABLET ORAL
Status: DISCONTINUED | OUTPATIENT
Start: 2023-09-09 | End: 2023-09-13

## 2023-09-09 RX ORDER — OXYCODONE HYDROCHLORIDE 5 MG/1
2.5 TABLET ORAL
Status: DISCONTINUED | OUTPATIENT
Start: 2023-09-09 | End: 2023-09-12

## 2023-09-09 RX ORDER — DULOXETIN HYDROCHLORIDE 60 MG/1
60 CAPSULE, DELAYED RELEASE ORAL DAILY
COMMUNITY

## 2023-09-09 RX ORDER — CHOLECALCIFEROL (VITAMIN D3) 125 MCG
10 CAPSULE ORAL NIGHTLY PRN
COMMUNITY

## 2023-09-09 RX ORDER — CEFTRIAXONE 1 G/1
1000 INJECTION, POWDER, FOR SOLUTION INTRAMUSCULAR; INTRAVENOUS ONCE
Status: COMPLETED | OUTPATIENT
Start: 2023-09-09 | End: 2023-09-09

## 2023-09-09 RX ORDER — BISACODYL 10 MG
10 SUPPOSITORY, RECTAL RECTAL
Status: DISCONTINUED | OUTPATIENT
Start: 2023-09-09 | End: 2023-09-10

## 2023-09-09 RX ORDER — LORAZEPAM 2 MG/ML
1 INJECTION INTRAMUSCULAR
Status: DISCONTINUED | OUTPATIENT
Start: 2023-09-09 | End: 2023-09-13

## 2023-09-09 RX ORDER — LORAZEPAM 1 MG/1
0.5 TABLET ORAL EVERY 4 HOURS PRN
Status: DISCONTINUED | OUTPATIENT
Start: 2023-09-09 | End: 2023-09-13

## 2023-09-09 RX ORDER — OMEPRAZOLE 20 MG/1
40 CAPSULE, DELAYED RELEASE ORAL DAILY
Status: DISCONTINUED | OUTPATIENT
Start: 2023-09-09 | End: 2023-09-14 | Stop reason: HOSPADM

## 2023-09-09 RX ORDER — MAGNESIUM SULFATE HEPTAHYDRATE 40 MG/ML
2 INJECTION, SOLUTION INTRAVENOUS ONCE
Status: COMPLETED | OUTPATIENT
Start: 2023-09-09 | End: 2023-09-09

## 2023-09-09 RX ORDER — LORAZEPAM 2 MG/ML
1 INJECTION INTRAMUSCULAR ONCE
Status: COMPLETED | OUTPATIENT
Start: 2023-09-09 | End: 2023-09-09

## 2023-09-09 RX ORDER — LORAZEPAM 2 MG/ML
0.5 INJECTION INTRAMUSCULAR EVERY 4 HOURS PRN
Status: DISCONTINUED | OUTPATIENT
Start: 2023-09-09 | End: 2023-09-13

## 2023-09-09 RX ADMIN — METOPROLOL SUCCINATE 25 MG: 25 TABLET, EXTENDED RELEASE ORAL at 10:17

## 2023-09-09 RX ADMIN — METRONIDAZOLE 500 MG: 500 INJECTION, SOLUTION INTRAVENOUS at 17:26

## 2023-09-09 RX ADMIN — MAGNESIUM SULFATE HEPTAHYDRATE 2 G: 2 INJECTION, SOLUTION INTRAVENOUS at 18:52

## 2023-09-09 RX ADMIN — FOLIC ACID: 5 INJECTION, SOLUTION INTRAMUSCULAR; INTRAVENOUS; SUBCUTANEOUS at 10:25

## 2023-09-09 RX ADMIN — OXYCODONE HYDROCHLORIDE 5 MG: 5 TABLET ORAL at 11:18

## 2023-09-09 RX ADMIN — HYDROMORPHONE HYDROCHLORIDE 0.25 MG: 1 INJECTION, SOLUTION INTRAMUSCULAR; INTRAVENOUS; SUBCUTANEOUS at 12:28

## 2023-09-09 RX ADMIN — LORAZEPAM 1.5 MG: 2 INJECTION INTRAMUSCULAR; INTRAVENOUS at 18:03

## 2023-09-09 RX ADMIN — GABAPENTIN 100 MG: 100 CAPSULE ORAL at 11:17

## 2023-09-09 RX ADMIN — ROSUVASTATIN CALCIUM 20 MG: 5 TABLET, FILM COATED ORAL at 17:19

## 2023-09-09 RX ADMIN — OMEPRAZOLE 40 MG: 20 CAPSULE, DELAYED RELEASE ORAL at 10:17

## 2023-09-09 RX ADMIN — SODIUM CHLORIDE 1000 ML: 9 INJECTION, SOLUTION INTRAVENOUS at 06:12

## 2023-09-09 RX ADMIN — ONDANSETRON 4 MG: 2 INJECTION INTRAMUSCULAR; INTRAVENOUS at 06:12

## 2023-09-09 RX ADMIN — METRONIDAZOLE 500 MG: 500 INJECTION, SOLUTION INTRAVENOUS at 08:15

## 2023-09-09 RX ADMIN — CEFTRIAXONE SODIUM 1000 MG: 1 INJECTION, POWDER, FOR SOLUTION INTRAMUSCULAR; INTRAVENOUS at 08:09

## 2023-09-09 RX ADMIN — LORAZEPAM 1 MG: 2 INJECTION INTRAMUSCULAR; INTRAVENOUS at 08:21

## 2023-09-09 RX ADMIN — MIRTAZAPINE 30 MG: 15 TABLET, FILM COATED ORAL at 21:43

## 2023-09-09 RX ADMIN — LORAZEPAM 1 MG: 1 TABLET ORAL at 14:39

## 2023-09-09 RX ADMIN — LORAZEPAM 1 MG: 1 TABLET ORAL at 10:39

## 2023-09-09 RX ADMIN — SODIUM CHLORIDE 1000 ML: 9 INJECTION, SOLUTION INTRAVENOUS at 08:05

## 2023-09-09 RX ADMIN — GABAPENTIN 100 MG: 100 CAPSULE ORAL at 17:20

## 2023-09-09 RX ADMIN — LEVETIRACETAM 500 MG: 500 TABLET, FILM COATED ORAL at 10:17

## 2023-09-09 RX ADMIN — LORAZEPAM 3 MG: 1 TABLET ORAL at 13:20

## 2023-09-09 RX ADMIN — SODIUM CHLORIDE: 9 INJECTION, SOLUTION INTRAVENOUS at 10:28

## 2023-09-09 RX ADMIN — LEVETIRACETAM 500 MG: 500 TABLET, FILM COATED ORAL at 17:20

## 2023-09-09 RX ADMIN — MORPHINE SULFATE 4 MG: 4 INJECTION, SOLUTION INTRAMUSCULAR; INTRAVENOUS at 06:12

## 2023-09-09 RX ADMIN — MORPHINE SULFATE 4 MG: 4 INJECTION, SOLUTION INTRAMUSCULAR; INTRAVENOUS at 08:05

## 2023-09-09 ASSESSMENT — LIFESTYLE VARIABLES
ANXIETY: MODERATELY ANXIOUS OR GUARDED, SO ANXIETY IS INFERRED
ORIENTATION AND CLOUDING OF SENSORIUM: DATE DISORIENTATION BY NO MORE THAN TWO CALENDAR DAYS
ORIENTATION AND CLOUDING OF SENSORIUM: DISORIENTED FOR PLACE AND / OR PERSON
ANXIETY: *
ANXIETY: NO ANXIETY (AT EASE)
PAROXYSMAL SWEATS: NO SWEAT VISIBLE
AGITATION: *
VISUAL DISTURBANCES: NOT PRESENT
VISUAL DISTURBANCES: NOT PRESENT
NAUSEA AND VOMITING: NO NAUSEA AND NO VOMITING
HEADACHE, FULLNESS IN HEAD: NOT PRESENT
TREMOR: TREMOR NOT VISIBLE BUT CAN BE FELT, FINGERTIP TO FINGERTIP
AGITATION: NORMAL ACTIVITY
AUDITORY DISTURBANCES: NOT PRESENT
AUDITORY DISTURBANCES: NOT PRESENT
ORIENTATION AND CLOUDING OF SENSORIUM: DATE DISORIENTATION BY NO MORE THAN TWO CALENDAR DAYS
ANXIETY: NO ANXIETY (AT EASE)
AGITATION: NORMAL ACTIVITY
AUDITORY DISTURBANCES: NOT PRESENT
NAUSEA AND VOMITING: NO NAUSEA AND NO VOMITING
TREMOR: *
TREMOR: *
PAROXYSMAL SWEATS: NO SWEAT VISIBLE
NAUSEA AND VOMITING: NO NAUSEA AND NO VOMITING
AUDITORY DISTURBANCES: NOT PRESENT
TREMOR: *
NAUSEA AND VOMITING: NO NAUSEA AND NO VOMITING
PAROXYSMAL SWEATS: NO SWEAT VISIBLE
AGITATION: *
DO YOU DRINK ALCOHOL: NO
TREMOR: TREMOR NOT VISIBLE BUT CAN BE FELT, FINGERTIP TO FINGERTIP
PAROXYSMAL SWEATS: NO SWEAT VISIBLE
AUDITORY DISTURBANCES: NOT PRESENT
PAROXYSMAL SWEATS: BARELY PERCEPTIBLE SWEATING. PALMS MOIST
TOTAL SCORE: 20
VISUAL DISTURBANCES: NOT PRESENT
ANXIETY: MILDLY ANXIOUS
TOTAL SCORE: 9
NAUSEA AND VOMITING: NO NAUSEA AND NO VOMITING
ANXIETY: MODERATELY ANXIOUS OR GUARDED, SO ANXIETY IS INFERRED
HEADACHE, FULLNESS IN HEAD: NOT PRESENT
TOTAL SCORE: 9
HEADACHE, FULLNESS IN HEAD: NOT PRESENT
VISUAL DISTURBANCES: NOT PRESENT
HEADACHE, FULLNESS IN HEAD: NOT PRESENT
ORIENTATION AND CLOUDING OF SENSORIUM: DISORIENTED FOR PLACE AND / OR PERSON
AGITATION: *
ORIENTATION AND CLOUDING OF SENSORIUM: DISORIENTED FOR PLACE AND / OR PERSON
TOTAL SCORE: 3
ORIENTATION AND CLOUDING OF SENSORIUM: ORIENTED AND CAN DO SERIAL ADDITIONS
HEADACHE, FULLNESS IN HEAD: NOT PRESENT
VISUAL DISTURBANCES: NOT PRESENT
PAROXYSMAL SWEATS: NO SWEAT VISIBLE
AGITATION: SOMEWHAT MORE THAN NORMAL ACTIVITY
VISUAL DISTURBANCES: NOT PRESENT
AUDITORY DISTURBANCES: NOT PRESENT
HEADACHE, FULLNESS IN HEAD: NOT PRESENT
TOTAL SCORE: 20
TREMOR: *
NAUSEA AND VOMITING: NO NAUSEA AND NO VOMITING
TOTAL SCORE: 3

## 2023-09-09 ASSESSMENT — FIBROSIS 4 INDEX
FIB4 SCORE: 0.94
FIB4 SCORE: 0.44
FIB4 SCORE: 0.44

## 2023-09-09 ASSESSMENT — ENCOUNTER SYMPTOMS
HEMOPTYSIS: 0
COUGH: 0
DOUBLE VISION: 0
DEPRESSION: 0
VOMITING: 0
WHEEZING: 0
BACK PAIN: 0
HEARTBURN: 0
MYALGIAS: 0
PND: 0
NERVOUS/ANXIOUS: 1
DIARRHEA: 0
BRUISES/BLEEDS EASILY: 0
CLAUDICATION: 0
NAUSEA: 0
PALPITATIONS: 0
BLURRED VISION: 0
HEADACHES: 0
FEVER: 0
ABDOMINAL PAIN: 1
CONSTIPATION: 0
CHILLS: 0
DIZZINESS: 0

## 2023-09-09 ASSESSMENT — PATIENT HEALTH QUESTIONNAIRE - PHQ9
7. TROUBLE CONCENTRATING ON THINGS, SUCH AS READING THE NEWSPAPER OR WATCHING TELEVISION: NOT AT ALL
6. FEELING BAD ABOUT YOURSELF - OR THAT YOU ARE A FAILURE OR HAVE LET YOURSELF OR YOUR FAMILY DOWN: NOT AL ALL
9. THOUGHTS THAT YOU WOULD BE BETTER OFF DEAD, OR OF HURTING YOURSELF: NOT AT ALL
SUM OF ALL RESPONSES TO PHQ QUESTIONS 1-9: 0
8. MOVING OR SPEAKING SO SLOWLY THAT OTHER PEOPLE COULD HAVE NOTICED. OR THE OPPOSITE, BEING SO FIGETY OR RESTLESS THAT YOU HAVE BEEN MOVING AROUND A LOT MORE THAN USUAL: NOT AT ALL
1. LITTLE INTEREST OR PLEASURE IN DOING THINGS: NOT AT ALL
5. POOR APPETITE OR OVEREATING: NOT AT ALL
SUM OF ALL RESPONSES TO PHQ9 QUESTIONS 1 AND 2: 0
5. POOR APPETITE OR OVEREATING: NOT AT ALL
4. FEELING TIRED OR HAVING LITTLE ENERGY: NOT AT ALL
4. FEELING TIRED OR HAVING LITTLE ENERGY: SEVERAL DAYS
6. FEELING BAD ABOUT YOURSELF - OR THAT YOU ARE A FAILURE OR HAVE LET YOURSELF OR YOUR FAMILY DOWN: NOT AL ALL
8. MOVING OR SPEAKING SO SLOWLY THAT OTHER PEOPLE COULD HAVE NOTICED. OR THE OPPOSITE, BEING SO FIGETY OR RESTLESS THAT YOU HAVE BEEN MOVING AROUND A LOT MORE THAN USUAL: NOT AT ALL
SUM OF ALL RESPONSES TO PHQ9 QUESTIONS 1 AND 2: 0
1. LITTLE INTEREST OR PLEASURE IN DOING THINGS: NOT AT ALL
7. TROUBLE CONCENTRATING ON THINGS, SUCH AS READING THE NEWSPAPER OR WATCHING TELEVISION: NOT AT ALL
9. THOUGHTS THAT YOU WOULD BE BETTER OFF DEAD, OR OF HURTING YOURSELF: NOT AT ALL
3. TROUBLE FALLING OR STAYING ASLEEP OR SLEEPING TOO MUCH: NOT AT ALL
2. FEELING DOWN, DEPRESSED, IRRITABLE, OR HOPELESS: NOT AT ALL
3. TROUBLE FALLING OR STAYING ASLEEP OR SLEEPING TOO MUCH: NOT AT ALL

## 2023-09-09 ASSESSMENT — COPD QUESTIONNAIRES
HAVE YOU SMOKED AT LEAST 100 CIGARETTES IN YOUR ENTIRE LIFE: NO/DON'T KNOW
COPD SCREENING SCORE: 2
DURING THE PAST 4 WEEKS HOW MUCH DID YOU FEEL SHORT OF BREATH: NONE/LITTLE OF THE TIME
DO YOU EVER COUGH UP ANY MUCUS OR PHLEGM?: NO/ONLY WITH OCCASIONAL COLDS OR INFECTIONS

## 2023-09-09 ASSESSMENT — PAIN DESCRIPTION - PAIN TYPE
TYPE: ACUTE PAIN

## 2023-09-09 NOTE — ASSESSMENT & PLAN NOTE
Cholecystostomy tube was removed by patient.  She is currently in florid alcohol withdrawal.  She needs to have her cholecystostomy tube replaced while her alcohol withdrawal is being treated. Discussed with Dr Grider.

## 2023-09-09 NOTE — H&P
Hospital Medicine History & Physical Note    Date of Service  9/9/2023    Primary Care Physician  Pcp Pt States None    Consultants  General surgery    Code Status  Full Code    Chief Complaint  Chief Complaint   Patient presents with    Abdominal Pain       History of Presenting Illness  Odalys Keenan is a 67 y.o. female who presented 9/9/2023 with past medical history of Crohn disease, hypertension, hyperlipidemia, alcohol abuse, is coming today complaining of right upper quadrant pain along with nausea and vomiting for the last 3 weeks, patient denies any fever but complaining of chills, no diaphoresis, patient stated that she drinks alcohol but she has not had any drinks since August, patient is originally from [place] but she moved to [facility] initially she was living with her sister but then apparently her sister did not want her to stay for long time so she has been moving from place to place, patient with decreased oral intake, she denies any focal weakness no numbness no tingling, she denies any chest pain or shortness of breath, patient has bilateral upper extremity tremors and again she denies any alcohol use recently no urinary symptoms no diarrhea, patient initially was found to have leukocytosis of 13,000, ultrasound showed gallbladder sludge with gallbladder wall thickening with no stones small quantity of perihepatic ascites could be bile leak from cholecystostomy tube, I discussed with general surgery at this time patient not candidate for surgery, will place new bile drain today, patient is going to be admitted also for alcohol withdrawal syndrome, patient started on CIWA protocol, have started patient on IV ceftriaxone and Flagyl, will follow-up blood cultures and culture from bile drain, I have discussed case and plan of care with patient, ERP, general surgery, nurse staff, all questions been answered.        Review of Systems  Review of Systems   Constitutional:  Negative for chills and  fever.   Eyes:  Negative for blurred vision and double vision.   Respiratory:  Negative for cough, hemoptysis and wheezing.    Cardiovascular:  Negative for chest pain, palpitations, claudication, leg swelling and PND.   Gastrointestinal:  Positive for abdominal pain. Negative for constipation, diarrhea, heartburn, nausea and vomiting.   Genitourinary:  Negative for hematuria and urgency.   Musculoskeletal:  Negative for back pain and myalgias.   Skin:  Negative for rash.   Neurological:  Negative for dizziness and headaches.   Endo/Heme/Allergies:  Does not bruise/bleed easily.   Psychiatric/Behavioral:  Negative for depression. The patient is nervous/anxious.        Past Medical History   has a past medical history of Crohn disease (HCC), HLD (hyperlipidemia), and Hypertension.    Surgical History   has no past surgical history on file.     Family History    Family history reviewed with patient. There is no family history that is pertinent to the chief complaint.     Social History   reports that she has been smoking cigarettes. She has never used smokeless tobacco. She reports current alcohol use. She reports that she does not currently use drugs.    Allergies  Allergies   Allergen Reactions    Nsaids Vomiting       Medications  Prior to Admission Medications   Prescriptions Last Dose Informant Patient Reported? Taking?   DULoxetine (CYMBALTA) 60 MG Cap DR Particles delayed-release capsule 9/8/2023 at PM Patient Yes Yes   Sig: Take 60 mg by mouth every day.   HYDROmorphone (DILAUDID) 2 MG Tab NOT YET STARTED at NOT YET STARTED Patient No No   Sig: Take 1-2 Tablets by mouth 2 times a day as needed for Severe Pain for up to 7 days.   Icy Hot extra-strength 10-30 % Cream topical cream ABOUT 1 WEEK at PRN Patient No No   Sig: Apply 1 Application topically 4 times a day as needed (to back or abdomen).   Naloxone (NARCAN) 4 MG/0.1ML Liquid PRN at PRN Patient No No   Sig: Spray one actuation in one nostril for overdose.  Call 911. May repeat in 2 minutes if needed.   gabapentin (NEURONTIN) 100 MG Cap 9/8/2023 at PM Patient No No   Sig: Take 1 Capsule by mouth 3 times a day.   levETIRAcetam (KEPPRA) 500 MG Tab 9/8/2023 at PM Patient No No   Sig: Take 1 Tablet by mouth every 12 hours.   lidocaine (LIDODERM) 5 % Patch FEW DAYS AGO at UNK Patient No No   Sig: Place 3 Patches on the skin every 24 hours.   melatonin 5 mg Tab FEW DAYS AGO at PRN Patient Yes Yes   Sig: Take 10 mg by mouth at bedtime as needed (Sleep).   metoprolol SR (TOPROL XL) 25 MG TABLET SR 24 HR 9/8/2023 at AM Patient No No   Sig: Take 1 Tablet by mouth every day.   mirtazapine (REMERON) 15 MG Tab 9/8/2023 at PM Patient No No   Sig: Take 2 Tablets by mouth at bedtime.   omeprazole (PRILOSEC) 40 MG delayed-release capsule 9/8/2023 at AM Patient Yes Yes   Sig: Take 40 mg by mouth every day.   ondansetron (ZOFRAN) 8 MG Tab PRN at PRN Patient No No   Sig: Take 1 Tablet by mouth every 8 hours as needed for Nausea/Vomiting.   rosuvastatin (CRESTOR) 20 MG Tab ABOUT 1 WEEK at OUT Patient Yes Yes   Sig: Take 20 mg by mouth every evening.      Facility-Administered Medications: None       Physical Exam  Temp:  [36.7 °C (98 °F)-37.1 °C (98.7 °F)] 37.1 °C (98.7 °F)  Pulse:  [] 113  Resp:  [13-20] 20  BP: (116-166)/(57-90) 126/75  SpO2:  [92 %-100 %] 93 %  Blood Pressure : 126/75   Temperature: 37.1 °C (98.7 °F)   Pulse: (!) 113   Respiration: 20   Pulse Oximetry: 93 %       Physical Exam  Vitals and nursing note reviewed.   Constitutional:       General: She is in acute distress.      Appearance: Normal appearance. She is ill-appearing.   HENT:      Head: Normocephalic.      Nose: Nose normal.      Mouth/Throat:      Mouth: Mucous membranes are dry.      Pharynx: Oropharynx is clear. No oropharyngeal exudate or posterior oropharyngeal erythema.   Eyes:      General: No scleral icterus.        Right eye: No discharge.         Left eye: No discharge.      Extraocular  Movements: Extraocular movements intact.      Conjunctiva/sclera: Conjunctivae normal.   Cardiovascular:      Rate and Rhythm: Normal rate and regular rhythm.      Pulses: Normal pulses.      Heart sounds: Normal heart sounds.   Pulmonary:      Effort: Pulmonary effort is normal. No respiratory distress.      Breath sounds: Normal breath sounds.   Abdominal:      General: There is no distension.      Tenderness: There is abdominal tenderness. There is no guarding or rebound.      Comments: Right upper quadrant pain, patient had a biliary drain that she pulled today, there is greenish discharge no bleeding.   Musculoskeletal:         General: Normal range of motion.      Cervical back: Normal range of motion and neck supple.      Right lower leg: No edema.      Left lower leg: No edema.   Skin:     General: Skin is warm and dry.      Capillary Refill: Capillary refill takes less than 2 seconds.      Coloration: Skin is not jaundiced.      Findings: No bruising.   Neurological:      General: No focal deficit present.      Mental Status: She is alert and oriented to person, place, and time.   Psychiatric:         Mood and Affect: Mood is anxious.         Speech: Speech is tangential.         Laboratory:  Recent Labs     09/06/23 2133 09/08/23  1650 09/09/23  0649   WBC 15.4* 11.9* 13.7*   RBC 3.86* 3.06* 2.94*   HEMOGLOBIN 11.9* 9.8* 9.3*   HEMATOCRIT 37.2* 29.5* 28.4*   MCV 96.4 96.4 96.6   MCH 30.8 32.0 31.6   MCHC 32.0* 33.2 32.7   RDW 13.9 48.9 48.8   PLATELETCT 1138* 759* 689*   MPV 10.0 9.7 9.9     Recent Labs     09/06/23 2133 09/08/23  1541 09/09/23  0649   SODIUM 137 134* 134*   POTASSIUM 3.8 4.2 4.1   CHLORIDE 103 97 102   CO2 15* 18* 22   GLUCOSE 136* 95 129*   BUN 12 14 13   CREATININE 1.0 0.57 0.57   CALCIUM 10.4 9.9 8.9     Recent Labs     09/06/23 2133 09/08/23  1541 09/09/23  0649   ALTSGPT 29 24 18   ASTSGOT 41 54* 20   ALKPHOSPHAT 385* 394* 330*   TBILIRUBIN 0.7 0.4 0.4   LIPASE 496* 111* 32  "  GLUCOSE 136* 95 129*         No results for input(s): \"NTPROBNP\" in the last 72 hours.      No results for input(s): \"TROPONINT\" in the last 72 hours.    Imaging:  [id number]-RUQ   Final Result         1.  Gallbladder sludge with reported gallbladder wall thickening but without visualized shadowing stones, compatible with known history of cholecystitis.   2.  Small quantity of perihepatic ascites, could represent bile leak from cholecystostomy tube   3.  Atherosclerosis        I have ordered EKG which is pending    Assessment/Plan:  Justification for Admission Status  I anticipate this patient will require at least two midnights for appropriate medical management, necessitating inpatient admission because will require IV antibiotics, follow-up blood cultures and bile duct cultures, will need a new cholecystostomy drain placement, CIWA protocol and treatment for her alcohol withdrawal.      * Abdominal pain- (present on admission)  Assessment & Plan  Likely due to acute on chronic cholecystitis  Discussed with general surgery, patient had drain placed but she pulled it today in the emergency room, plan is to replace drain and continue IV antibiotics and get cultures.  Patient not candidate for surgery at this time    Acute pancreatitis- (present on admission)  Assessment & Plan  Continue supportive treatment IV pain medication IV fluids, follow-up lipase in a.m.    Anemia  Assessment & Plan  We will check iron, ferritin, reticulocyte count, vitamin B12    Hypomagnesemia- (present on admission)  Assessment & Plan  Replacing with IV magnesian  Follow-up magnesium levels    Paroxysmal atrial fibrillation (HCC)- (present on admission)  Assessment & Plan  Continue metoprolol  Not on any anticoagulation  Will reassessed anticoagulation need after drain placement  We will get EKG    Crohn's disease (HCC)- (present on admission)  Assessment & Plan  Monitoring    Hyperglycemia- (present on admission)  Assessment & Plan  Check " A1c    Alcohol withdrawal delirium (HCC)- (present on admission)  Assessment & Plan  Started on CIWA protocol  Checking magnesium  Checking phosphorus  Fall precautions  Ativan IV as needed  Low threshold to transition to IMCU    Dyslipidemia- (present on admission)  Assessment & Plan  Check lipid panel in a.m.    Seizures (HCC)- (present on admission)  Assessment & Plan  Continue Keppra  Seizure precautions    Anxiety and depression- (present on admission)  Assessment & Plan  Monitoring    Elevated liver enzymes- (present on admission)  Assessment & Plan  Alkaline phosphate, likely related to acute/chronic cholecystitis        VTE prophylaxis: SCDs/TEDs      I have reviewed patient's CBC  I have reviewed patient's CMP  I have reviewed patient's lipase level    I have discussed with ER doctor  I have discussed with general surgery  I have discussed with nurse staff  I have reviewed patient's current and old records  I have placed order to admit patient.   I have started patient on started patient on IV Dilaudid for pain medication, monitoring for side effects include but not limited to hypotension, confusion, respiratory suppression.  I have started patient on as needed Ativan, monitoring for side effects including but not limited to hypotension, increasing confusion.  I have ordered labs for in am incluiding CBC, CMP.   I have arranged for nephrostomy tube placement with IR.

## 2023-09-09 NOTE — ED NOTES
Assumed care of patient, bedside report received from off coming RN Niya.  Introduced self as pt RN, POC discussed, call light in reach, cardiac monitor in use. Fall risk interventions in place. Patient A/Ox4.

## 2023-09-09 NOTE — DISCHARGE INSTRUCTIONS
Please follow-up closely with general surgery.  If placed referral for you.  Please follow-up with your primary care physician as well for ongoing pain management.  If you develop a fever or worsening symptoms we are here for you

## 2023-09-09 NOTE — ASSESSMENT & PLAN NOTE
CIWAs 12.  -Continue CIWA protocol  -Continue Librium 25 mg q8h.  Will try to taper the following days  - Monitor electrolytes  - Patient denies ongoing alcohol consumption, needs further counseling   - Restrains as needed

## 2023-09-09 NOTE — ED PROVIDER NOTES
"ED Provider Note    CHIEF COMPLAINT  Chief Complaint   Patient presents with    Abdominal Pain       EXTERNAL RECORDS REVIEWED  Inpatient Notes reviewed last admission August 29 to the hospital for gallbladder issues    HPI/ROS  LIMITATION TO HISTORY   Select: : None      Odalys Keenan is a 67 y.o. female who presents stating that she is got severe right upper quadrant pain that is worse than it usually is and in spite of her drain being in place her symptoms are getting worse.  She last had some Gatorade and apple sauce yesterday afternoon but generally has not been food tolerant for a couple of weeks.  She denies any fever but arrives tachycardic stating that her last drink was in mid August but I do not think this is accurate information as I believe she currently is in alcohol withdrawal    PAST MEDICAL HISTORY   has a past medical history of Crohn disease (HCC), HLD (hyperlipidemia), and Hypertension.    SURGICAL HISTORY  patient denies any surgical history    FAMILY HISTORY  History reviewed. No pertinent family history.    SOCIAL HISTORY  Social History     Tobacco Use    Smoking status: Some Days     Types: Cigarettes    Smokeless tobacco: Never   Vaping Use    Vaping Use: Never used   Substance and Sexual Activity    Alcohol use: Yes     Comment: \"quite a bit\"    Drug use: Not Currently    Sexual activity: Not on file       CURRENT MEDICATIONS  Home Medications       Reviewed by Niya Aranda R.N. (Registered Nurse) on 09/09/23 at 0501  Med List Status: Partial     Medication Last Dose Status   acetaminophen (TYLENOL) 325 MG Tab  Active   gabapentin (NEURONTIN) 100 MG Cap  Active   HYDROmorphone (DILAUDID) 2 MG Tab  Active   Icy Hot extra-strength 10-30 % Cream topical cream  Active   levETIRAcetam (KEPPRA) 500 MG Tab  Active   lidocaine (LIDODERM) 5 % Patch  Active   metoprolol SR (TOPROL XL) 25 MG TABLET SR 24 HR  Active   mirtazapine (REMERON) 15 MG Tab  Active   Naloxone (NARCAN) 4 " "MG/0.1ML Liquid  Active   ondansetron (ZOFRAN) 8 MG Tab  Active                    ALLERGIES  Allergies   Allergen Reactions    Nsaids Vomiting       PHYSICAL EXAM  VITAL SIGNS: BP (!) 156/77   Pulse (!) 113   Temp 37.1 °C (98.7 °F) (Temporal)   Resp 13   Ht 1.727 m (5' 8\")   Wt 59 kg (130 lb)   SpO2 97%   BMI 19.77 kg/m²    Constitutional: Appears older than stated age and ill in appearance.   HENT: Normocephalic, Atraumatic, Bilateral external ears normal, Oropharynx is clear mucous membranes are dry. No oral exudates or nasal discharge.   Eyes: Pupils are equal round and reactive, EOMI, Conjunctiva normal, No discharge.   Neck: Normal range of motion, No tenderness, Supple, No stridor. No meningismus.  Lymphatic: No lymphadenopathy noted.   Cardiovascular: Tachycardic rate and rhythm without murmur rub or gallop.  Thorax & Lungs: Clear breath sounds bilaterally without wheezes, rhonchi or rales. There is no chest wall tenderness.   Abdomen: Soft with considerable tenderness in the right upper quadrant, positive Hunt sign, Cheryl drain in place is draining bile effectively no blood and no evidence of skin infection. There is no rebound or guarding. No organomegaly is appreciated. Bowel sounds are normal.  Skin: Normal without rash.  Minimal telangiectasias  Back: No CVA or spinal tenderness.   Extremities: Intact distal pulses, No edema, No tenderness, No cyanosis, No clubbing. Capillary refill is less than 2 seconds.  Musculoskeletal: Good range of motion in all major joints. No tenderness to palpation or major deformities noted.   Neurologic: Alert & oriented x 3, Normal motor function, Normal sensory function, No focal deficits noted.  Mild tremors with hands extended noted but no liver flap psychiatric: Affect normal, Judgment normal, Mood normal. There is no suicidal ideation or patient reported hallucinations.       DIAGNOSTIC STUDIES / PROCEDURES    LABS  No biliary obstruction " noted    RADIOLOGY  I have independently interpreted the diagnostic imaging associated with this visit and am waiting the final reading from the radiologist.   My preliminary interpretation is as follows: Sludge and thickening of gallbladder wall    Radiologist interpretation:   US-RUQ   Final Result         1.  Gallbladder sludge with reported gallbladder wall thickening but without visualized shadowing stones, compatible with known history of cholecystitis.   2.  Small quantity of perihepatic ascites, could represent bile leak from cholecystostomy tube   3.  Atherosclerosis            COURSE & MEDICAL DECISION MAKING    ED Observation Status? No; Patient does not meet criteria for ED Observation.     INITIAL ASSESSMENT, COURSE AND PLAN  Care Narrative: Patient presents tachycardic and right upper quadrant abdominal pain with a biliary drain in place that is draining bile effectively but she still has considerable pain.  She may be in mild alcohol withdrawal but states that her last drink was the last time she was admitted to the hospital which was mid August.  I do not believe this is accurate    HYDRATION: Based on the patient's presentation of Other n.p.o. status the patient was given IV fluids. IV Hydration was used because oral hydration was not adequate alone. Upon recheck following hydration, the patient was somewhat improved on her tachycardia.    Gallbladder ultrasound shows thickening gallbladder wall, sludge and findings consistent with acute cholecystitis.    Laboratory evaluation reveals leukocytosis at 13,700 with 85% PMN shift.  Liver enzymes are unremarkable except for elevated alk phos of 330 but no evidence of acidosis.  Back on the seventh she was positive for benzos and opiates.    Following Ativan and fluids she remains somewhat tachycardic.  Will likely need to give her more benzodiazepines and fluid    CRITICAL CARE  The very real possibilty of a deterioration of this patient's condition  required the highest level of my preparedness for sudden, emergent intervention.  I provided critical care services, which included medication orders, frequent reevaluations of the patient's condition and response to treatment, ordering and reviewing test results, and discussing the case with various consultants.  The critical care time associated with the care of the patient was 30 minutes. Review chart for interventions. This time is exclusive of any other billable procedures.         ADDITIONAL PROBLEM LIST  Alcohol abuse.  Intermittent.  May be in mild withdrawal today.  We will treat with benzos as needed    DISPOSITION AND DISCUSSIONS  I have discussed management of the patient with the following physicians and MELLISA's: I spoke with Dr. Chavez who will see the patient in consultation and that I spoke with Dr. Shaver's, [facility] who will see the patient for admission.  We will focus on pain control, antibiotic administration and determination of potential need for surgery and treat alcohol withdrawal      FINAL DIAGNOSIS  1. Acute cholecystitis    2. Alcohol withdrawal syndrome with complication (HCC)    3.  Critical care time, 30 minutes       Electronically signed by: Mikey June M.D., 9/9/2023 8:10 AM

## 2023-09-09 NOTE — ASSESSMENT & PLAN NOTE
On episode on last admission due to infection and withdrawal. Here in sinus tarchycardia in the setting of infection and dehydration.   - DVT pp for now, continue assessing need for anticoagulation  - Telemetry

## 2023-09-09 NOTE — PROGRESS NOTES
4 Eyes Skin Assessment Completed by PIERO Solomon and PIERO Noel.    Head WDL  Ears WDL  Nose WDL  Mouth WDL  Neck WDL  Breast/Chest WDL  Shoulder Blades WDL  Spine WDL  (R) Arm/Elbow/Hand Bruising, Abrasion, and Scab  (L) Arm/Elbow/Hand Bruising, Abrasion, and Scab  Abdomen Incision - location from previous drain that was self removed by pt in ER. Oozing bile fluid, MD aware.   Groin WDL  Scrotum/Coccyx/Buttocks Redness and Blanching  (R) Leg Scar and Scab  (L) Leg Scar and Scab  (R) Heel/Foot/Toe WDL  (L) Heel/Foot/Toe WDL          Devices In Places Tele Box      Interventions In Place N/A    Possible Skin Injury No    Pictures Uploaded Into Epic N/A  Wound Consult Placed N/A  RN Wound Prevention Protocol Ordered No

## 2023-09-09 NOTE — PROGRESS NOTES
Pt becoming increasingly agitated and restless. Pt pulled out PIV, removed tele monitor and gown and attempting to get out of bed repeatedly. Pt confused, disoriented to location and situation. CIWA reassessed and pt medicated per MAR. MD notified. Orders received for bilateral soft wrist restraints. Pt placed in restraints without incident.

## 2023-09-09 NOTE — CARE PLAN
The patient is Watcher - Medium risk of patient condition declining or worsening    Shift Goals  Clinical Goals: Pain management, safety  Patient Goals: Pain control  Family Goals: NA    Progress made toward(s) clinical / shift goals:      Problem: Knowledge Deficit - Standard  Goal: Patient and family/care givers will demonstrate understanding of plan of care, disease process/condition, diagnostic tests and medications  Description: Target End Date:  1-3 days or as soon as patient condition allows    Document in Patient Education    1.  Patient and family/caregiver oriented to unit, equipment, visitation policy and means for communicating concern  2.  Complete/review Learning Assessment  3.  Assess knowledge level of disease process/condition, treatment plan, diagnostic tests and medications  4.  Explain disease process/condition, treatment plan, diagnostic tests and medications  Outcome: Not Progressing     Problem: Lifestyle Changes  Goal: Patient's ability to identify lifestyle changes and available resources to help reduce recurrence of condition will improve  Description: Target End Date:  1 to 3 days    1.  Discuss recommended lifestyle changes  2.  Identify available resources and support systems  3.  Consider referral to substance abuse program  Outcome: Not Progressing     Problem: Fall Risk  Goal: Patient will remain free from falls  Description: Target End Date:  Prior to discharge or change in level of care    Document interventions on the [facility] Fall Risk Assessment    1.  Assess for fall risk factors  2.  Implement fall precautions  Outcome: Not Progressing     Problem: Safety - Medical Restraint  Goal: Remains free of injury from restraints (Restraint for Interference with Medical Device)  Description: INTERVENTIONS:  1. Determine that other, less restrictive measures have been tried or would not be effective before applying the restraint  2. Evaluate the patient's condition at the time of  restraint application  3. Educate patient/family regarding the reason for restraint  4. Q2H: Monitor safety, psychosocial status, comfort, circulation, respiratory status, LOC, nutrition and hydration  Outcome: Not Progressing  Goal: Free from restraint(s) (Restraint for Interference with Medical Device)  Description: INTERVENTIONS:  1.  ONCE/SHIFT or MINIMUM Q12H: Assess and document the continuing need for restraints  2.  Q24H: Continued use of restraint requires LIP to perform face to face examination and written order  3.  Identify and implement measures to help patient regain control  4.  Educate patient/family on discontinuation criteria   5.  Assess patient's understanding and retention of education provided  6.  Assess readiness for release & initiate progressive release per protocol  7.  Identify and document criteria for restraints  Outcome: Not Progressing     Problem: Pain - Standard  Goal: Alleviation of pain or a reduction in pain to the patient’s comfort goal  Description: Target End Date:  Prior to discharge or change in level of care    Document on Vitals flowsheet    1.  Document pain using the appropriate pain scale per order or unit policy  2.  Educate and implement non-pharmacologic comfort measures (i.e. relaxation, distraction, massage, cold/heat therapy, etc.)  3.  Pain management medications as ordered  4.  Reassess pain after pain med administration per policy  5.  If opiods administered assess patient's response to pain medication is appropriate per POSS sedation scale  6.  Follow pain management plan developed in collaboration with patient and interdisciplinary team (including palliative care or pain specialists if applicable)  Outcome: Progressing     Problem: Optimal Care for Alcohol Withdrawal  Goal: Optimal Care for the alcohol withdrawal patient  Description: Target End Date:  1 to 3 days    1.  Alcohol history screening done on admission  2.  CIWA-AR score assessment (includes  assessment of nausea/vomiting, tremor, sweats, anxiety, agitation, tactile, visual and auditory disturbances, headache and orientation/sensorium).  Document on CIWA flowsheet.  3.  Follow CIWA-AR score protocol  4.  Frequent reorientation  5.  Monitor for fluid and electrolyte imbalance.  6.  Assess for respiratory depression due to sedation (pulse ox)  7.  Consider thiamine, multivitamins, folic acid and magnesium sulfate per provider order  8.  Collaborate with Social Workers/Case Management  9.  Collaborate with mental health  Outcome: Progressing     Problem: Seizure Precautions  Goal: Implementation of seizure precautions  Description: Target End Date:  Prior to discharge or change in level of care    1.  Padded side rails up at all times  2.  Suction equipment and oxygen delivery system at bedside  3.  Continuous pulse oximeter in use  4.  Implement fall precautions, bed alarm on, bed in lowest position  5.  IV access (per order)  6.  Provide low stimulus environment, avoid exposure to triggers  7.  Instruct patient to use call light/seizure button if having warning signs of impending seizure  Outcome: Progressing     Problem: Psychosocial  Goal: Patient's level of anxiety will decrease  Description: Target End Date:  1-3 days or as soon as patient condition allows    1.  Collaborate with patient and family/caregiver to identify triggers and develop strategies to cope with anxiety  2.  Implement stimuli reduction, calming techniques  3.  Pharmacologic management per provider order  4.  Encourage patient/family/care giver participation  5.  Collaborate with interdisciplinary team including Psychologist or Behavioral Health Team as needed  Outcome: Progressing  Goal: Spiritual and cultural needs incorporated into hospitalization  Description: Target End Date:  End of day 1    1.  Encourage verbalization of feelings, concerns, expectations and needs  2.  Collaborate with Case Management/  3.   Collaborate with Pastoral Care to meet spiritual needs  Outcome: Progressing       Patient is not progressing towards the following goals:      Problem: Knowledge Deficit - Standard  Goal: Patient and family/care givers will demonstrate understanding of plan of care, disease process/condition, diagnostic tests and medications  Description: Target End Date:  1-3 days or as soon as patient condition allows    Document in Patient Education    1.  Patient and family/caregiver oriented to unit, equipment, visitation policy and means for communicating concern  2.  Complete/review Learning Assessment  3.  Assess knowledge level of disease process/condition, treatment plan, diagnostic tests and medications  4.  Explain disease process/condition, treatment plan, diagnostic tests and medications  Outcome: Not Progressing     Problem: Lifestyle Changes  Goal: Patient's ability to identify lifestyle changes and available resources to help reduce recurrence of condition will improve  Description: Target End Date:  1 to 3 days    1.  Discuss recommended lifestyle changes  2.  Identify available resources and support systems  3.  Consider referral to substance abuse program  Outcome: Not Progressing     Problem: Fall Risk  Goal: Patient will remain free from falls  Description: Target End Date:  Prior to discharge or change in level of care    Document interventions on the [facility] Fall Risk Assessment    1.  Assess for fall risk factors  2.  Implement fall precautions  Outcome: Not Progressing     Problem: Safety - Medical Restraint  Goal: Remains free of injury from restraints (Restraint for Interference with Medical Device)  Description: INTERVENTIONS:  1. Determine that other, less restrictive measures have been tried or would not be effective before applying the restraint  2. Evaluate the patient's condition at the time of restraint application  3. Educate patient/family regarding the reason for restraint  4. Q2H: Monitor  safety, psychosocial status, comfort, circulation, respiratory status, LOC, nutrition and hydration  Outcome: Not Progressing  Goal: Free from restraint(s) (Restraint for Interference with Medical Device)  Description: INTERVENTIONS:  1.  ONCE/SHIFT or MINIMUM Q12H: Assess and document the continuing need for restraints  2.  Q24H: Continued use of restraint requires LIP to perform face to face examination and written order  3.  Identify and implement measures to help patient regain control  4.  Educate patient/family on discontinuation criteria   5.  Assess patient's understanding and retention of education provided  6.  Assess readiness for release & initiate progressive release per protocol  7.  Identify and document criteria for restraints  Outcome: Not Progressing

## 2023-09-09 NOTE — ED TRIAGE NOTES
PT BIB EMS for   Chief Complaint   Patient presents with    Abdominal Pain    PT presents with RUQ pan at gallbladder drain insertion site. PT was recently seen on 9/8 in ED and was discharged home. Pt reports she can no longer tolerate the pain at home.

## 2023-09-09 NOTE — ASSESSMENT & PLAN NOTE
Alkaline phosphate 292, likely related to acute/chronic cholecystitis.   - Monitor  - See plan for cholecystitis

## 2023-09-09 NOTE — CONSULTS
DATE OF CONSULTATION:  9/9/2023     REFERRING PHYSICIAN:   Mikey June M.D.     CONSULTING PHYSICIAN:  Meryl Chavez M.D.     REASON FOR CONSULTATION:  I have been asked by  to see the patient in surgical consultation for evaluation of cholecystostomy tube.    HISTORY OF PRESENT ILLNESS: The patient is a 67 year-old White woman who presents to the Emergency Department with pain in her abdomen and alcohol withdrawal.  Patient has a cholecystostomy tube that was placed approximately 2 weeks ago.  She states that she has some pain associated with her right upper quadrant with the drain in place.  Ultrasound here shows that the drain is in good position.  She has multiple electrolyte abnormalities as well as a leukocytosis today on presentation.  Shortly after I went to visit with her she apparently pulled her drain out secondary to agitation.  Upon my visit with her she was incredibly tremulous and clearly is having severe alcohol withdrawal symptoms.    PAST MEDICAL HISTORY:  has a past medical history of Crohn disease (HCC), HLD (hyperlipidemia), and Hypertension.    PAST SURGICAL HISTORY:  has no past surgical history on file.    ALLERGIES:   Allergies   Allergen Reactions    Nsaids Vomiting       CURRENT MEDICATIONS:    Home Medications       Reviewed by Dolores Ruby (Pharmacy Tech) on 09/09/23 at 0820  Med List Status: Complete     Medication Last Dose Status   DULoxetine (CYMBALTA) 60 MG Cap DR Particles delayed-release capsule 9/8/2023 Active   gabapentin (NEURONTIN) 100 MG Cap 9/8/2023 Active   HYDROmorphone (DILAUDID) 2 MG Tab NOT YET STARTED Active   Icy Hot extra-strength 10-30 % Cream topical cream ABOUT 1 WEEK Active   levETIRAcetam (KEPPRA) 500 MG Tab 9/8/2023 Active   lidocaine (LIDODERM) 5 % Patch FEW DAYS AGO Active   melatonin 5 mg Tab FEW DAYS AGO Active   metoprolol SR (TOPROL XL) 25 MG TABLET SR 24 HR 9/8/2023 Active   mirtazapine (REMERON) 15 MG Tab 9/8/2023 Active   Naloxone  (NARCAN) 4 MG/0.1ML Liquid PRN Active   omeprazole (PRILOSEC) 40 MG delayed-release capsule 9/8/2023 Active   ondansetron (ZOFRAN) 8 MG Tab PRN Active   rosuvastatin (CRESTOR) 20 MG Tab ABOUT 1 WEEK Active                    FAMILY HISTORY: family history is not on file.    SOCIAL HISTORY:  reports that she has been smoking cigarettes. She has never used smokeless tobacco. She reports current alcohol use. She reports that she does not currently use drugs.    REVIEW OF SYSTEMS: Comprehensive review of systems is negative with the exception of the aforementioned HPI, PMH, and PSH bullets in accordance with CMS guidelines.    PHYSICAL EXAMINATION:    Vitals:    09/09/23 0951   BP: (!) 144/73   Pulse: (!) 121   Resp: 16   Temp: 36.3 °C (97.3 °F)   SpO2: 96%       Physical Exam  Vitals and nursing note reviewed. Exam conducted with a chaperone present.   Constitutional:       Appearance: She is ill-appearing.      Comments: Severe tremor   HENT:      Head: Normocephalic.      Right Ear: External ear normal.      Left Ear: External ear normal.      Nose: Nose normal.      Mouth/Throat:      Mouth: Mucous membranes are moist.   Eyes:      Pupils: Pupils are equal, round, and reactive to light.   Cardiovascular:      Rate and Rhythm: Regular rhythm. Tachycardia present.      Pulses: Normal pulses.   Pulmonary:      Effort: Pulmonary effort is normal.   Abdominal:      General: Abdomen is flat.      Palpations: Abdomen is soft.      Comments: Cholecystostomy tube in right upper quadrant    Musculoskeletal:         General: Normal range of motion.      Cervical back: Normal range of motion.   Skin:     General: Skin is warm.      Capillary Refill: Capillary refill takes less than 2 seconds.   Neurological:      Mental Status: She is oriented to person, place, and time.      Coordination: Coordination abnormal.      Comments: withdrawal   Psychiatric:         Mood and Affect: Mood normal.         LABORATORY VALUES:   Recent  Labs     09/06/23 2133 09/08/23  1650 09/09/23  0649   WBC 15.4* 11.9* 13.7*   RBC 3.86* 3.06* 2.94*   HEMOGLOBIN 11.9* 9.8* 9.3*   HEMATOCRIT 37.2* 29.5* 28.4*   MCV 96.4 96.4 96.6   MCH 30.8 32.0 31.6   MCHC 32.0* 33.2 32.7   RDW 13.9 48.9 48.8   PLATELETCT 1138* 759* 689*   MPV 10.0 9.7 9.9     Recent Labs     09/06/23 2133 09/08/23  1541 09/09/23  0649   SODIUM 137 134* 134*   POTASSIUM 3.8 4.2 4.1   CHLORIDE 103 97 102   CO2 15* 18* 22   GLUCOSE 136* 95 129*   BUN 12 14 13   CREATININE 1.0 0.57 0.57   CALCIUM 10.4 9.9 8.9     Recent Labs     09/06/23 2133 09/08/23  1541 09/09/23  0649   ASTSGOT 41 54* 20   ALTSGPT 29 24 18   TBILIRUBIN 0.7 0.4 0.4   ALKPHOSPHAT 385* 394* 330*   GLOBULIN  --  3.9* 2.8            IMAGING:   US-RUQ   Final Result         1.  Gallbladder sludge with reported gallbladder wall thickening but without visualized shadowing stones, compatible with known history of cholecystitis.   2.  Small quantity of perihepatic ascites, could represent bile leak from cholecystostomy tube   3.  Atherosclerosis      IR-DRAIN-CHOLECTYSTOSTOMY    (Results Pending)       ASSESSMENT AND PLAN:     * Abdominal pain- (present on admission)  Assessment & Plan  Cholecystostomy tube was removed by patient.  She is currently in florid alcohol withdrawal.  She needs to have her cholecystostomy tube replaced while her alcohol withdrawal is being treated. Discussed with Dr Grider.        DISPOSITION: Medical evaluation and admission for withdrawal. [facility] Acute Care Surgery [facility] Service will follow.     ____________________________________     Meryl Chavez M.D.    DD: 9/9/2023  11:05 AM

## 2023-09-09 NOTE — ED NOTES
Discharge instructions discussed with pt, verbalizes understanding. PIVs removed. Drain site dressed and taped. Supplies given to pt. All belongings with pt when leaving unit. Pt amb to lobby steady gait.

## 2023-09-09 NOTE — ASSESSMENT & PLAN NOTE
Likely due to acute on chronic cholecystitis  Discussed with general surgery, patient had drain placed but she pulled it today in the emergency room, plan is to replace drain and continue IV antibiotics and get cultures.  Patient not candidate for surgery at this time

## 2023-09-09 NOTE — ED NOTES
Patient's drain found pulled out on the ground. MD notified. Dressing placed. Patient A/Ox4 but restless and anxious.

## 2023-09-09 NOTE — ED NOTES
Pt medicated per MAR, tolerating well. Pt resting with even chest rise and fall, reports no needs at this time, call light available and in reach.

## 2023-09-09 NOTE — ASSESSMENT & PLAN NOTE
Abdominal pain and increased lipase on admission. Abdominal pain and dehydration also likely from cholecystitis.   -  cc/h and bolus PRN, NPO for now  - Monitor clinically

## 2023-09-09 NOTE — ED NOTES
Multiple attempt to establish PIV, ultrasound line to be placed by tech. Pt resting with even chest rise and fall, reports no needs at this time, call light available and in reach.

## 2023-09-10 PROBLEM — R65.10 SIRS (SYSTEMIC INFLAMMATORY RESPONSE SYNDROME) (HCC): Status: ACTIVE | Noted: 2023-09-10

## 2023-09-10 PROBLEM — R19.7 DIARRHEA: Status: ACTIVE | Noted: 2023-09-10

## 2023-09-10 PROBLEM — K81.1 CHRONIC CHOLECYSTITIS: Status: ACTIVE | Noted: 2023-09-10

## 2023-09-10 PROBLEM — R10.9 ABDOMINAL PAIN: Status: RESOLVED | Noted: 2023-09-09 | Resolved: 2023-09-10

## 2023-09-10 LAB
ALBUMIN SERPL BCP-MCNC: 3.2 G/DL (ref 3.2–4.9)
ALBUMIN/GLOB SERPL: 1 G/DL
ALP SERPL-CCNC: 338 U/L (ref 30–99)
ALT SERPL-CCNC: 16 U/L (ref 2–50)
ANION GAP SERPL CALC-SCNC: 10 MMOL/L (ref 7–16)
AST SERPL-CCNC: 16 U/L (ref 12–45)
BILIRUB SERPL-MCNC: 0.5 MG/DL (ref 0.1–1.5)
BUN SERPL-MCNC: 7 MG/DL (ref 8–22)
C DIFF DNA SPEC QL NAA+PROBE: NEGATIVE
C DIFF TOX A+B STL QL IA: NEGATIVE
C DIFF TOX GENS STL QL NAA+PROBE: NORMAL
CALCIUM ALBUM COR SERPL-MCNC: 9.4 MG/DL (ref 8.5–10.5)
CALCIUM SERPL-MCNC: 8.8 MG/DL (ref 8.5–10.5)
CHLORIDE SERPL-SCNC: 106 MMOL/L (ref 96–112)
CO2 SERPL-SCNC: 22 MMOL/L (ref 20–33)
CREAT SERPL-MCNC: 0.5 MG/DL (ref 0.5–1.4)
ERYTHROCYTE [DISTWIDTH] IN BLOOD BY AUTOMATED COUNT: 49.8 FL (ref 35.9–50)
GFR SERPLBLD CREATININE-BSD FMLA CKD-EPI: 103 ML/MIN/1.73 M 2
GGT SERPL-CCNC: 207 U/L (ref 7–34)
GLOBULIN SER CALC-MCNC: 3.1 G/DL (ref 1.9–3.5)
GLUCOSE SERPL-MCNC: 108 MG/DL (ref 65–99)
HCT VFR BLD AUTO: 29 % (ref 37–47)
HGB BLD-MCNC: 9 G/DL (ref 12–16)
LIPASE SERPL-CCNC: 46 U/L (ref 11–82)
MAGNESIUM SERPL-MCNC: 1.8 MG/DL (ref 1.5–2.5)
MCH RBC QN AUTO: 30.5 PG (ref 27–33)
MCHC RBC AUTO-ENTMCNC: 31 G/DL (ref 32.2–35.5)
MCV RBC AUTO: 98.3 FL (ref 81.4–97.8)
PHOSPHATE SERPL-MCNC: 2.8 MG/DL (ref 2.5–4.5)
PLATELET # BLD AUTO: 607 K/UL (ref 164–446)
PMV BLD AUTO: 10.7 FL (ref 9–12.9)
POTASSIUM SERPL-SCNC: 3.9 MMOL/L (ref 3.6–5.5)
PROT SERPL-MCNC: 6.3 G/DL (ref 6–8.2)
RBC # BLD AUTO: 2.95 M/UL (ref 4.2–5.4)
SODIUM SERPL-SCNC: 138 MMOL/L (ref 135–145)
WBC # BLD AUTO: 9.5 K/UL (ref 4.8–10.8)

## 2023-09-10 PROCEDURE — A9270 NON-COVERED ITEM OR SERVICE: HCPCS

## 2023-09-10 PROCEDURE — 700105 HCHG RX REV CODE 258

## 2023-09-10 PROCEDURE — 700105 HCHG RX REV CODE 258: Performed by: HOSPITALIST

## 2023-09-10 PROCEDURE — 87493 C DIFF AMPLIFIED PROBE: CPT

## 2023-09-10 PROCEDURE — 99233 SBSQ HOSP IP/OBS HIGH 50: CPT | Mod: GC | Performed by: INTERNAL MEDICINE

## 2023-09-10 PROCEDURE — 99231 SBSQ HOSP IP/OBS SF/LOW 25: CPT | Performed by: SURGERY

## 2023-09-10 PROCEDURE — 96376 TX/PRO/DX INJ SAME DRUG ADON: CPT

## 2023-09-10 PROCEDURE — A9270 NON-COVERED ITEM OR SERVICE: HCPCS | Performed by: HOSPITALIST

## 2023-09-10 PROCEDURE — 700102 HCHG RX REV CODE 250 W/ 637 OVERRIDE(OP): Performed by: HOSPITALIST

## 2023-09-10 PROCEDURE — 85027 COMPLETE CBC AUTOMATED: CPT

## 2023-09-10 PROCEDURE — 700101 HCHG RX REV CODE 250: Performed by: HOSPITALIST

## 2023-09-10 PROCEDURE — HZ2ZZZZ DETOXIFICATION SERVICES FOR SUBSTANCE ABUSE TREATMENT: ICD-10-PCS | Performed by: HOSPITALIST

## 2023-09-10 PROCEDURE — 700102 HCHG RX REV CODE 250 W/ 637 OVERRIDE(OP)

## 2023-09-10 PROCEDURE — 87040 BLOOD CULTURE FOR BACTERIA: CPT | Mod: 91

## 2023-09-10 PROCEDURE — 770020 HCHG ROOM/CARE - TELE (206)

## 2023-09-10 PROCEDURE — 700111 HCHG RX REV CODE 636 W/ 250 OVERRIDE (IP): Performed by: HOSPITALIST

## 2023-09-10 PROCEDURE — 700111 HCHG RX REV CODE 636 W/ 250 OVERRIDE (IP): Mod: JZ

## 2023-09-10 PROCEDURE — 83735 ASSAY OF MAGNESIUM: CPT

## 2023-09-10 PROCEDURE — 80053 COMPREHEN METABOLIC PANEL: CPT

## 2023-09-10 PROCEDURE — 87324 CLOSTRIDIUM AG IA: CPT

## 2023-09-10 PROCEDURE — 83690 ASSAY OF LIPASE: CPT

## 2023-09-10 PROCEDURE — 96366 THER/PROPH/DIAG IV INF ADDON: CPT

## 2023-09-10 PROCEDURE — 36415 COLL VENOUS BLD VENIPUNCTURE: CPT

## 2023-09-10 PROCEDURE — 84100 ASSAY OF PHOSPHORUS: CPT

## 2023-09-10 RX ORDER — CHLORDIAZEPOXIDE HYDROCHLORIDE 25 MG/1
25 CAPSULE, GELATIN COATED ORAL EVERY 8 HOURS
Status: DISCONTINUED | OUTPATIENT
Start: 2023-09-10 | End: 2023-09-12

## 2023-09-10 RX ORDER — ENOXAPARIN SODIUM 100 MG/ML
40 INJECTION SUBCUTANEOUS DAILY
Status: DISCONTINUED | OUTPATIENT
Start: 2023-09-10 | End: 2023-09-14 | Stop reason: HOSPADM

## 2023-09-10 RX ADMIN — MIRTAZAPINE 30 MG: 15 TABLET, FILM COATED ORAL at 21:42

## 2023-09-10 RX ADMIN — METRONIDAZOLE 500 MG: 500 INJECTION, SOLUTION INTRAVENOUS at 05:59

## 2023-09-10 RX ADMIN — HYDROMORPHONE HYDROCHLORIDE 0.25 MG: 1 INJECTION, SOLUTION INTRAMUSCULAR; INTRAVENOUS; SUBCUTANEOUS at 06:28

## 2023-09-10 RX ADMIN — CHLORDIAZEPOXIDE HYDROCHLORIDE 25 MG: 25 CAPSULE ORAL at 21:43

## 2023-09-10 RX ADMIN — LEVETIRACETAM 500 MG: 500 TABLET, FILM COATED ORAL at 18:01

## 2023-09-10 RX ADMIN — CEFTRIAXONE SODIUM 1000 MG: 10 INJECTION, POWDER, FOR SOLUTION INTRAVENOUS at 06:01

## 2023-09-10 RX ADMIN — LORAZEPAM 1 MG: 2 INJECTION INTRAMUSCULAR; INTRAVENOUS at 15:58

## 2023-09-10 RX ADMIN — LORAZEPAM 1 MG: 2 INJECTION INTRAMUSCULAR; INTRAVENOUS at 07:54

## 2023-09-10 RX ADMIN — SENNOSIDES AND DOCUSATE SODIUM 2 TABLET: 50; 8.6 TABLET ORAL at 06:00

## 2023-09-10 RX ADMIN — HYDROMORPHONE HYDROCHLORIDE 0.25 MG: 1 INJECTION, SOLUTION INTRAMUSCULAR; INTRAVENOUS; SUBCUTANEOUS at 23:06

## 2023-09-10 RX ADMIN — THERA TABS 1 TABLET: TAB at 06:00

## 2023-09-10 RX ADMIN — LORAZEPAM 2 MG: 2 TABLET ORAL at 18:02

## 2023-09-10 RX ADMIN — LORAZEPAM 2 MG: 2 TABLET ORAL at 22:46

## 2023-09-10 RX ADMIN — AMPICILLIN AND SULBACTAM 3 G: 1; 2 INJECTION, POWDER, FOR SOLUTION INTRAMUSCULAR; INTRAVENOUS at 13:20

## 2023-09-10 RX ADMIN — ROSUVASTATIN CALCIUM 20 MG: 5 TABLET, FILM COATED ORAL at 18:00

## 2023-09-10 RX ADMIN — FOLIC ACID 1 MG: 1 TABLET ORAL at 06:00

## 2023-09-10 RX ADMIN — OXYCODONE HYDROCHLORIDE 5 MG: 5 TABLET ORAL at 18:01

## 2023-09-10 RX ADMIN — Medication 100 MG: at 06:00

## 2023-09-10 RX ADMIN — GABAPENTIN 100 MG: 100 CAPSULE ORAL at 18:01

## 2023-09-10 RX ADMIN — GABAPENTIN 100 MG: 100 CAPSULE ORAL at 06:00

## 2023-09-10 RX ADMIN — OXYCODONE HYDROCHLORIDE 5 MG: 5 TABLET ORAL at 21:42

## 2023-09-10 RX ADMIN — OMEPRAZOLE 40 MG: 20 CAPSULE, DELAYED RELEASE ORAL at 06:00

## 2023-09-10 RX ADMIN — OXYCODONE HYDROCHLORIDE 5 MG: 5 TABLET ORAL at 02:03

## 2023-09-10 RX ADMIN — CHLORDIAZEPOXIDE HYDROCHLORIDE 25 MG: 25 CAPSULE ORAL at 13:15

## 2023-09-10 RX ADMIN — GABAPENTIN 100 MG: 100 CAPSULE ORAL at 13:16

## 2023-09-10 RX ADMIN — LEVETIRACETAM 500 MG: 500 TABLET, FILM COATED ORAL at 06:00

## 2023-09-10 RX ADMIN — LORAZEPAM 0.5 MG: 1 TABLET ORAL at 03:25

## 2023-09-10 RX ADMIN — AMPICILLIN AND SULBACTAM 3 G: 1; 2 INJECTION, POWDER, FOR SOLUTION INTRAMUSCULAR; INTRAVENOUS at 18:04

## 2023-09-10 RX ADMIN — METOPROLOL SUCCINATE 25 MG: 25 TABLET, EXTENDED RELEASE ORAL at 06:00

## 2023-09-10 RX ADMIN — SODIUM CHLORIDE: 9 INJECTION, SOLUTION INTRAVENOUS at 07:57

## 2023-09-10 ASSESSMENT — LIFESTYLE VARIABLES
HEADACHE, FULLNESS IN HEAD: NOT PRESENT
ORIENTATION AND CLOUDING OF SENSORIUM: ORIENTED AND CAN DO SERIAL ADDITIONS
ORIENTATION AND CLOUDING OF SENSORIUM: ORIENTED AND CAN DO SERIAL ADDITIONS
NAUSEA AND VOMITING: NO NAUSEA AND NO VOMITING
ANXIETY: MODERATELY ANXIOUS OR GUARDED, SO ANXIETY IS INFERRED
VISUAL DISTURBANCES: NOT PRESENT
TREMOR: *
AGITATION: *
TOTAL SCORE: 13
TOTAL SCORE: 3
TOTAL SCORE: 5
NAUSEA AND VOMITING: NO NAUSEA AND NO VOMITING
NAUSEA AND VOMITING: NO NAUSEA AND NO VOMITING
VISUAL DISTURBANCES: NOT PRESENT
TOTAL SCORE: 13
VISUAL DISTURBANCES: NOT PRESENT
AGITATION: *
TOTAL SCORE: 13
AUDITORY DISTURBANCES: NOT PRESENT
AGITATION: NORMAL ACTIVITY
NAUSEA AND VOMITING: NO NAUSEA AND NO VOMITING
TREMOR: TREMOR NOT VISIBLE BUT CAN BE FELT, FINGERTIP TO FINGERTIP
TREMOR: *
ORIENTATION AND CLOUDING OF SENSORIUM: DATE DISORIENTATION BY NO MORE THAN TWO CALENDAR DAYS
HEADACHE, FULLNESS IN HEAD: MILD
AGITATION: *
PAROXYSMAL SWEATS: NO SWEAT VISIBLE
HEADACHE, FULLNESS IN HEAD: NOT PRESENT
TREMOR: *
ANXIETY: MILDLY ANXIOUS
AGITATION: NORMAL ACTIVITY
PAROXYSMAL SWEATS: NO SWEAT VISIBLE
PAROXYSMAL SWEATS: BARELY PERCEPTIBLE SWEATING. PALMS MOIST
ANXIETY: *
VISUAL DISTURBANCES: NOT PRESENT
ORIENTATION AND CLOUDING OF SENSORIUM: CANNOT DO SERIAL ADDITIONS OR IS UNCERTAIN ABOUT DATE
TOTAL SCORE: 11
PAROXYSMAL SWEATS: NO SWEAT VISIBLE
VISUAL DISTURBANCES: NOT PRESENT
HEADACHE, FULLNESS IN HEAD: NOT PRESENT
TOTAL SCORE: 0
AUDITORY DISTURBANCES: NOT PRESENT
TREMOR: *
HEADACHE, FULLNESS IN HEAD: NOT PRESENT
TREMOR: *
PAROXYSMAL SWEATS: NO SWEAT VISIBLE
AUDITORY DISTURBANCES: NOT PRESENT
HEADACHE, FULLNESS IN HEAD: NOT PRESENT
AUDITORY DISTURBANCES: NOT PRESENT
ORIENTATION AND CLOUDING OF SENSORIUM: CANNOT DO SERIAL ADDITIONS OR IS UNCERTAIN ABOUT DATE
PAROXYSMAL SWEATS: NO SWEAT VISIBLE
HEADACHE, FULLNESS IN HEAD: VERY MILD
ORIENTATION AND CLOUDING OF SENSORIUM: CANNOT DO SERIAL ADDITIONS OR IS UNCERTAIN ABOUT DATE
TREMOR: NO TREMOR
ANXIETY: NO ANXIETY (AT EASE)
ORIENTATION AND CLOUDING OF SENSORIUM: CANNOT DO SERIAL ADDITIONS OR IS UNCERTAIN ABOUT DATE
HEADACHE, FULLNESS IN HEAD: NOT PRESENT
TREMOR: MODERATE TREMOR WITH ARMS EXTENDED
NAUSEA AND VOMITING: NO NAUSEA AND NO VOMITING
VISUAL DISTURBANCES: NOT PRESENT
AUDITORY DISTURBANCES: NOT PRESENT
ANXIETY: NO ANXIETY (AT EASE)
AUDITORY DISTURBANCES: NOT PRESENT
AUDITORY DISTURBANCES: NOT PRESENT
TREMOR: TREMOR NOT VISIBLE BUT CAN BE FELT, FINGERTIP TO FINGERTIP
NAUSEA AND VOMITING: NO NAUSEA AND NO VOMITING
ANXIETY: MODERATELY ANXIOUS OR GUARDED, SO ANXIETY IS INFERRED
NAUSEA AND VOMITING: NO NAUSEA AND NO VOMITING
TOTAL SCORE: 2
TOTAL SCORE: 12
NAUSEA AND VOMITING: NO NAUSEA AND NO VOMITING
AGITATION: MODERATELY FIDGETY AND RESTLESS
VISUAL DISTURBANCES: NOT PRESENT
PAROXYSMAL SWEATS: NO SWEAT VISIBLE
AGITATION: SOMEWHAT MORE THAN NORMAL ACTIVITY
ORIENTATION AND CLOUDING OF SENSORIUM: CANNOT DO SERIAL ADDITIONS OR IS UNCERTAIN ABOUT DATE
PAROXYSMAL SWEATS: NO SWEAT VISIBLE
PAROXYSMAL SWEATS: NO SWEAT VISIBLE
ANXIETY: NO ANXIETY (AT EASE)
VISUAL DISTURBANCES: NOT PRESENT
AUDITORY DISTURBANCES: NOT PRESENT
ORIENTATION AND CLOUDING OF SENSORIUM: CANNOT DO SERIAL ADDITIONS OR IS UNCERTAIN ABOUT DATE
AUDITORY DISTURBANCES: NOT PRESENT
NAUSEA AND VOMITING: NO NAUSEA AND NO VOMITING
ANXIETY: *
AGITATION: *
VISUAL DISTURBANCES: NOT PRESENT
AGITATION: NORMAL ACTIVITY
HEADACHE, FULLNESS IN HEAD: NOT PRESENT
ANXIETY: *

## 2023-09-10 ASSESSMENT — PATIENT HEALTH QUESTIONNAIRE - PHQ9
9. THOUGHTS THAT YOU WOULD BE BETTER OFF DEAD, OR OF HURTING YOURSELF: NOT AT ALL
4. FEELING TIRED OR HAVING LITTLE ENERGY: SEVERAL DAYS
5. POOR APPETITE OR OVEREATING: NOT AT ALL
3. TROUBLE FALLING OR STAYING ASLEEP OR SLEEPING TOO MUCH: NOT AT ALL
SUM OF ALL RESPONSES TO PHQ QUESTIONS 1-9: 1
1. LITTLE INTEREST OR PLEASURE IN DOING THINGS: NOT AT ALL
7. TROUBLE CONCENTRATING ON THINGS, SUCH AS READING THE NEWSPAPER OR WATCHING TELEVISION: NOT AT ALL
SUM OF ALL RESPONSES TO PHQ9 QUESTIONS 1 AND 2: 0
2. FEELING DOWN, DEPRESSED, IRRITABLE, OR HOPELESS: NOT AT ALL
8. MOVING OR SPEAKING SO SLOWLY THAT OTHER PEOPLE COULD HAVE NOTICED. OR THE OPPOSITE, BEING SO FIGETY OR RESTLESS THAT YOU HAVE BEEN MOVING AROUND A LOT MORE THAN USUAL: NOT AT ALL
6. FEELING BAD ABOUT YOURSELF - OR THAT YOU ARE A FAILURE OR HAVE LET YOURSELF OR YOUR FAMILY DOWN: NOT AL ALL

## 2023-09-10 ASSESSMENT — PAIN DESCRIPTION - PAIN TYPE
TYPE: ACUTE PAIN
TYPE: ACUTE PAIN

## 2023-09-10 NOTE — PROGRESS NOTES
Monitor summary:     Rhythm : ST   Rate : 104-115  Ectopy : None    NV=.13  QRS=.07  QT=.30        Per telemetry strip summary from monitor room.

## 2023-09-10 NOTE — PROGRESS NOTES
"    DATE: 9/11/2023    Hospital Day 2 cholecystitis with cholecystectomy tube placement 2 weeks ago, now in severe alcohol withdrawal .    INTERVAL EVENTS:  Patient pulled out ken tube 9/9 secondary to agitation.  Alcohol withdrawal symptoms improving.   Patient remains in bilateral wrist restraints.   Abdominal pain with palpation, otherwise she is resting comfortably.     REVIEW OF SYSTEMS:  Review of Systems   Constitutional:  Negative for chills and fever.   Gastrointestinal:  Positive for abdominal pain. Negative for nausea and vomiting.       PHYSICAL EXAMINATION:  Vital Signs: BP (!) 149/85   Pulse (!) 104   Temp 37 °C (98.6 °F) (Temporal)   Resp 18   Ht 1.727 m (5' 8\")   Wt 59.7 kg (131 lb 9.8 oz)   SpO2 96%   Physical Exam  Eyes:      General: No scleral icterus.     Conjunctiva/sclera: Conjunctivae normal.   Abdominal:      Comments: Dressing in place over previous ken tube site, bile draining.   Diffuse tenderness with palpation.   No distension.    Skin:     General: Skin is warm and dry.      Coloration: Skin is not jaundiced.         LABORATORY VALUES:   Recent Labs     09/09/23  0649 09/10/23  0348 09/11/23  0439   WBC 13.7* 9.5 10.1   RBC 2.94* 2.95* 2.79*   HEMOGLOBIN 9.3* 9.0* 8.8*   HEMATOCRIT 28.4* 29.0* 27.1*   MCV 96.6 98.3* 97.1   MCH 31.6 30.5 31.5   MCHC 32.7 31.0* 32.5   RDW 48.8 49.8 48.9   PLATELETCT 689* 607* 536*   MPV 9.9 10.7 10.2     Recent Labs     09/09/23  0649 09/10/23  0348 09/11/23  0439   SODIUM 134* 138 139   POTASSIUM 4.1 3.9 3.0*   CHLORIDE 102 106 104   CO2 22 22 23   GLUCOSE 129* 108* 89   BUN 13 7* 4*   CREATININE 0.57 0.50 0.46*   CALCIUM 8.9 8.8 8.1*     Recent Labs     09/09/23  0649 09/09/23  1141 09/09/23  1347 09/10/23  0348 09/11/23  0439   ASTSGOT 20  --   --  16 9*   ALTSGPT 18  --   --  16 10   TBILIRUBIN 0.4  --   --  0.5 0.4   ALKPHOSPHAT 330*  --   --  338* 292*   GLOBULIN 2.8  --   --  3.1 2.9   INR  --  1.12  --   --   --    AMMONIA  --   --  15  " --   --      Recent Labs     09/09/23  1141   INR 1.12        IMAGING:   US-RUQ   Final Result         1.  Gallbladder sludge with reported gallbladder wall thickening but without visualized shadowing stones, compatible with known history of cholecystitis.   2.  Small quantity of perihepatic ascites, could represent bile leak from cholecystostomy tube   3.  Atherosclerosis      IR-DRAIN-CHOLECTYSTOSTOMY    (Results Pending)   CT-ABDOMEN-PELVIS W/O    (Results Pending)         ASSESSMENT AND PLAN:   - Continue to monitor for alcohol withdrawal.  - Cholecystectomy tube replacement when medically appropriate.   - ACS blue team following.     Discussed patient condition with Hospitalist, RN, Patient, and general surgery. Dr. Meryl Chavez.

## 2023-09-10 NOTE — PROGRESS NOTES
Monitor summary  Sinus Tachycardia  With PVC       Rhythm range at 100-103  ID 0.17 QRS 0.9

## 2023-09-10 NOTE — PROGRESS NOTES
Patient on wrist restraints,bilaterally,agitated on an off. Skin check around the restraints done,toileting offered,pulse present and no reddening seen.

## 2023-09-10 NOTE — ASSESSMENT & PLAN NOTE
Likely multifactorial, acute on chronic. Likely from cholecystitis.  C. difficile panel was negative but diarrhea persists.  -Will start vancomycin as C. difficile prophylaxis per ID recommendation as long as she is receiving Unasyn.  - IVF   - Monitor

## 2023-09-10 NOTE — PROGRESS NOTES
"  DATE: 9/10/2023    Hospital Day 2  alcohol withdrawal and removal of recent c tube .    INTERVAL EVENTS:  Leaking from prior C-tube entry.  Very subdued secondary to severe withdrawal and medication.    REVIEW OF SYSTEMS:  Comprehensive review of systems is negative with the exception of the aforementioned HPI, PMH, and PSH bullets in accordance with CMS guidelines.    PHYSICAL EXAMINATION:  Vital Signs: BP (!) 150/84   Pulse (!) 102   Temp 37.2 °C (99 °F) (Temporal)   Resp 17   Ht 1.727 m (5' 8\")   Wt 60.5 kg (133 lb 6.1 oz)   SpO2 95%   Physical Exam  Abdominal:      General: There is no distension.      Comments: Bile draining. Relatively benign, but patient subdued.          LABORATORY VALUES:  Recent Labs     09/08/23  1650 09/09/23  0649 09/10/23  0348   WBC 11.9* 13.7* 9.5   RBC 3.06* 2.94* 2.95*   HEMOGLOBIN 9.8* 9.3* 9.0*   HEMATOCRIT 29.5* 28.4* 29.0*   MCV 96.4 96.6 98.3*   MCH 32.0 31.6 30.5   MCHC 33.2 32.7 31.0*   RDW 48.9 48.8 49.8   PLATELETCT 759* 689* 607*   MPV 9.7 9.9 10.7     Recent Labs     09/08/23  1541 09/09/23  0649 09/10/23  0348   SODIUM 134* 134* 138   POTASSIUM 4.2 4.1 3.9   CHLORIDE 97 102 106   CO2 18* 22 22   GLUCOSE 95 129* 108*   BUN 14 13 7*   CREATININE 0.57 0.57 0.50   CALCIUM 9.9 8.9 8.8     Recent Labs     09/08/23  1541 09/09/23  0649 09/09/23  1141 09/09/23  1347 09/10/23  0348   ASTSGOT 54* 20  --   --  16   ALTSGPT 24 18  --   --  16   TBILIRUBIN 0.4 0.4  --   --  0.5   ALKPHOSPHAT 394* 330*  --   --  338*   GLOBULIN 3.9* 2.8  --   --  3.1   INR  --   --  1.12  --   --    AMMONIA  --   --   --  15  --      Recent Labs     09/09/23  1141   INR 1.12       IMAGING:  US-RUQ   Final Result         1.  Gallbladder sludge with reported gallbladder wall thickening but without visualized shadowing stones, compatible with known history of cholecystitis.   2.  Small quantity of perihepatic ascites, could represent bile leak from cholecystostomy tube   3.  Atherosclerosis    "   IR-DRAIN-CHOLECTYSTOSTOMY    (Results Pending)       ASSESSMENT AND PLAN:    * Abdominal pain- (present on admission)  Assessment & Plan  Cholecystostomy tube was removed by patient.  She is currently in florid alcohol withdrawal.  She needs to have her cholecystostomy tube replaced while her alcohol withdrawal is being treated. Discussed with Dr Grider.             ____________________________________     Meryl Chavez M.D.    DD: 9/10/2023  11:42 AM

## 2023-09-10 NOTE — ASSESSMENT & PLAN NOTE
Noted last admission. CT and HIDA scan were consistent with chronic cholecystitis, treated with IV Zosyn, oral vancomycin, IV Flagyl.  Patient underwent CT-guided gallbladder drainage 80/27 by IR.  Per ID she was transitioned to IV cefepime plus Flagyl end date of 9/2/2023. Readmitted with abdominal pain and US showing pericholecystic fluid-leaking. Patient removed drain.   - IR evaluated, to place new RUQ drain  if more stable from withdrawal  -Unasyn until completing 5 days  - Monitor abdomen and SIRS

## 2023-09-10 NOTE — PROGRESS NOTES
Pt scored 11 CIWA at 1300. By time medication pulled and brought to pt, pt was increasingly somnolent. This RN did not feel it was safe for pt to have dose at this time, CIWA rescored at 2. Medication wasted at [facility] with PIERO Marlow. MD KEMI Espinosa notified.

## 2023-09-10 NOTE — PROGRESS NOTES
[facility] Internal Medicine Daily Progress Note    Date of Service  9/10/2023    [facility] Team: R IM Blue Team   Attending: Dotty Blanca M.d.  Senior Resident: Dr. Espinosa  Contact Number: [phone]    Chief Complaint  Odalys Keenan is a 67 y.o. female admitted 9/9/2023 with abdominal pain.    Hospital Course  Odalys Keenan is a 67 y.o. female with past medical history of Crohn disease, hypertension, hyperlipidemia, alcohol abuse, and opiate abuse, who presented 9/9/2023 complaining of right upper quadrant pain along with nausea and vomiting for the last 3 weeks. Patient was found to have leukocytosis of 13,000, ultrasound showed gallbladder sludge with gallbladder wall thickening with no stones small quantity of perihepatic ascites could be bile leak from cholecystostomy tube.     Patient had a recent patient in 8/25 for acute cholecystitis and pancolitis-positive for C. difficile A&B toxin.  CT and HIDA scan were consistent with chronic cholecystitis, treated with IV Zosyn, oral vancomycin, IV Flagyl.  Patient underwent CT-guided gallbladder drainage 80/27 by IR.  Per ID she was transition to IV cefepime plus Flagyl end date of 9/2/2023.  Gallbladder drainage culture positive for lactobacillus.  Gastroenterology was also consulted and recommended colonoscopy as outpatient when infection has resolved, and consideration for treatment of Crohn's disease then.  They are had recommended to drain to remain in place for 6 weeks.     Pt admitted also for alcohol withdrawal syndrome, patient started on CIWA protocol, have started patient on IV ceftriaxone and Flagyl. Surgery was consulted, not a candidate for surgical management and recommended cholecystostomy tube replaced while her alcohol withdrawal is being treated.     Interval Problem Update  Surgery was consulted, not a candidate for surgical management and recommended cholecystostomy tube replaced while her alcohol withdrawal is being treated.     No  overnight events.     VS tachycardia in the 100s, SBP 150s/70s, on room air.     Patient complains of persistent abdominal abdominal pain in the RUQ, although improved compared to admission. No N or V since yesterday but had had 4 episodes of watery diarrhea between 7 and 11 AM. Very thirsty. No fever, no upper respiratory symptoms, no joint pain. Weakness.     CIWAs still around 15. Start Librium.     I have discussed this patient's plan of care and discharge plan at IDT rounds today with Case Management, Nursing, Nursing leadership, and other members of the IDT team.    Consultants/Specialty  general surgery    Code Status  Full Code    Disposition  Not medically clear.   Dispo TBD.   I have placed the appropriate orders for post-discharge needs.    Physical Exam  Temp:  [36.1 °C (97 °F)-37.2 °C (99 °F)] 37.2 °C (99 °F)  Pulse:  [102-109] 102  Resp:  [16-19] 17  BP: (129-154)/(69-84) 150/84  SpO2:  [95 %-99 %] 95 %    Physical Exam  Constitutional:       Appearance: She is ill-appearing.   HENT:      Head: Normocephalic and atraumatic.      Mouth/Throat:      Mouth: Mucous membranes are dry.   Eyes:      Extraocular Movements: Extraocular movements intact.      Conjunctiva/sclera: Conjunctivae normal.      Pupils: Pupils are equal, round, and reactive to light.   Cardiovascular:      Rate and Rhythm: Regular rhythm. Tachycardia present.      Heart sounds: No murmur heard.     Comments: Hyperdynamic.  Pulmonary:      Effort: Pulmonary effort is normal.      Breath sounds: Normal breath sounds.   Abdominal:      General: Abdomen is flat.      Comments: Increased bowel sounds, covered wound (no bleeding or discharge) in RUQ, surrounding tenderness with no rebound or guarding.    Musculoskeletal:         General: Normal range of motion.      Cervical back: Normal range of motion and neck supple.   Skin:     General: Skin is dry.      Capillary Refill: Capillary refill takes less than 2 seconds.   Neurological:       General: No focal deficit present.      Mental Status: She is alert. Mental status is at baseline.         Fluids    Intake/Output Summary (Last 24 hours) at 9/10/2023 1501  Last data filed at 9/10/2023 1146  Gross per 24 hour   Intake 0 ml   Output 1300 ml   Net -1300 ml       Laboratory  Recent Labs     09/08/23  1650 09/09/23  0649 09/10/23  0348   WBC 11.9* 13.7* 9.5   RBC 3.06* 2.94* 2.95*   HEMOGLOBIN 9.8* 9.3* 9.0*   HEMATOCRIT 29.5* 28.4* 29.0*   MCV 96.4 96.6 98.3*   MCH 32.0 31.6 30.5   MCHC 33.2 32.7 31.0*   RDW 48.9 48.8 49.8   PLATELETCT 759* 689* 607*   MPV 9.7 9.9 10.7     Recent Labs     09/08/23  1541 09/09/23  0649 09/10/23  0348   SODIUM 134* 134* 138   POTASSIUM 4.2 4.1 3.9   CHLORIDE 97 102 106   CO2 18* 22 22   GLUCOSE 95 129* 108*   BUN 14 13 7*   CREATININE 0.57 0.57 0.50   CALCIUM 9.9 8.9 8.8     Recent Labs     09/09/23  1141   INR 1.12               Imaging  US-RUQ   Final Result         1.  Gallbladder sludge with reported gallbladder wall thickening but without visualized shadowing stones, compatible with known history of cholecystitis.   2.  Small quantity of perihepatic ascites, could represent bile leak from cholecystostomy tube   3.  Atherosclerosis      IR-DRAIN-CHOLECTYSTOSTOMY    (Results Pending)        Assessment/Plan:     * Chronic cholecystitis  Assessment & Plan  Noted last admission. CT and HIDA scan were consistent with chronic cholecystitis, treated with IV Zosyn, oral vancomycin, IV Flagyl.  Patient underwent CT-guided gallbladder drainage 80/27 by IR.  Per ID she was transitioned to IV cefepime plus Flagyl end date of 9/2/2023. Readmitted with abdominal pain and US showing pericholecystic fluid-leaking. Patient removed drain.   - IR evaluated, to place new RUQ drain tomorrow if more stable from withdrawal  - Switch Flagyl and C3 to Unasyn   - Monitor abdomen and SIRS      SIRS (systemic inflammatory response syndrome) (HCC)  Assessment & Plan  SIRS criteria identified on  my evaluation include:  Tachycardia, with heart rate greater than 90 BPM and Leukocytosis, with WBC greater than 12,000  Likely from cholecystitis and infectious diarrhea.   - On unasyn  - IVF has received 3 lt      Diarrhea- (present on admission)  Assessment & Plan  Likely multifactorial, acute on chronic. Likely from pancreatitis and cholecystitis, however, given recent C. diff infection and watery diarrhea, need to rule out.   - PCR c diff and toxin.   - Start oral Vancomycin if positive toxin (mild infection)  - Change Ceftriaxone to Unasyn   - IVF   - Monitor     Alcohol withdrawal delirium (HCC)- (present on admission)  Assessment & Plan  CIWAs from 15 to 20. Improving today. Associated tachycardia and high BP.  - Started on CIWA protocol  - Added Librium 25 mg q8h   - Monitor electrolytes  - Patient denies ongoing alcohol consumption, needs further counseling   - Restrains as needed    Acute pancreatitis- (present on admission)  Assessment & Plan  Abdominal pain and increased lipase on admission. Abdominal pain and dehydration also likely from cholecystitis.   -  cc/h and bolus PRN, NPO for now  - Monitor clinically    Anemia  Assessment & Plan  Iron panel compatible with inflammatory anemia. No ongoing bleeding.   - Monitor     Hypomagnesemia- (present on admission)  Assessment & Plan  Replacing as needed.    Paroxysmal atrial fibrillation (HCC)- (present on admission)  Assessment & Plan  On episode on last admission due to infection and withdrawal. Here in sinus tarchycardia in the setting of infection and dehydration.   - DVT pp for now, continue assessing need for anticoagulation  - Telemetry    Crohn's disease (HCC)- (present on admission)  Assessment & Plan  Unclear diagnosis. Concern for ongoing C. Diff colitis. Pt was planned for outpt colonoscopy. No GI bleed reported.  - See plan for diarrhea    Hyperglycemia- (present on admission)  Assessment & Plan  A1c wnl.   - Monitor     Dyslipidemia-  (present on admission)  Assessment & Plan  Check lipid panel in a.m.    Seizures (HCC)- (present on admission)  Assessment & Plan  Continue Keppra  Seizure precautions    Anxiety and depression- (present on admission)  Assessment & Plan  At baseline.   - Monitoring    Elevated liver enzymes- (present on admission)  Assessment & Plan  Alkaline phosphate, likely related to acute/chronic cholecystitis.   - Monitor  - See plan for cholecystitis       VTE prophylaxis: enoxaparin ppx hold for procedure    I have performed a physical exam and reviewed and updated ROS and Plan today (9/10/2023). In review of yesterday's note (9/9/2023), there are no changes except as documented above.

## 2023-09-10 NOTE — CARE PLAN
The patient is Watcher - Medium risk of patient condition declining or worsening    Shift Goals  Clinical Goals: safety of patient  Patient Goals: patient will be aqble to sleep  Family Goals: na    Progress made toward(s) clinical / shift goals:

## 2023-09-10 NOTE — ASSESSMENT & PLAN NOTE
SIRS criteria identified on my evaluation include:  Tachycardia, with heart rate greater than 90 BPM and Leukocytosis, with WBC greater than 12,000  Likely from cholecystitis and infectious diarrhea.   - On unasyn  - IVF has received 3 lt

## 2023-09-11 ENCOUNTER — APPOINTMENT (OUTPATIENT)
Dept: RADIOLOGY | Facility: MEDICAL CENTER | Age: 68
DRG: 896 | End: 2023-09-11
Attending: STUDENT IN AN ORGANIZED HEALTH CARE EDUCATION/TRAINING PROGRAM
Payer: MEDICARE

## 2023-09-11 LAB
ALBUMIN SERPL BCP-MCNC: 2.9 G/DL (ref 3.2–4.9)
ALBUMIN/GLOB SERPL: 1 G/DL
ALP SERPL-CCNC: 292 U/L (ref 30–99)
ALT SERPL-CCNC: 10 U/L (ref 2–50)
ANION GAP SERPL CALC-SCNC: 12 MMOL/L (ref 7–16)
AST SERPL-CCNC: 9 U/L (ref 12–45)
BASOPHILS # BLD AUTO: 0.7 % (ref 0–1.8)
BASOPHILS # BLD: 0.07 K/UL (ref 0–0.12)
BILIRUB SERPL-MCNC: 0.4 MG/DL (ref 0.1–1.5)
BUN SERPL-MCNC: 4 MG/DL (ref 8–22)
CALCIUM ALBUM COR SERPL-MCNC: 9 MG/DL (ref 8.5–10.5)
CALCIUM SERPL-MCNC: 8.1 MG/DL (ref 8.5–10.5)
CHLORIDE SERPL-SCNC: 104 MMOL/L (ref 96–112)
CO2 SERPL-SCNC: 23 MMOL/L (ref 20–33)
CREAT SERPL-MCNC: 0.46 MG/DL (ref 0.5–1.4)
EOSINOPHIL # BLD AUTO: 0.21 K/UL (ref 0–0.51)
EOSINOPHIL NFR BLD: 2.1 % (ref 0–6.9)
ERYTHROCYTE [DISTWIDTH] IN BLOOD BY AUTOMATED COUNT: 48.9 FL (ref 35.9–50)
GFR SERPLBLD CREATININE-BSD FMLA CKD-EPI: 105 ML/MIN/1.73 M 2
GLOBULIN SER CALC-MCNC: 2.9 G/DL (ref 1.9–3.5)
GLUCOSE SERPL-MCNC: 89 MG/DL (ref 65–99)
HCT VFR BLD AUTO: 27.1 % (ref 37–47)
HGB BLD-MCNC: 8.8 G/DL (ref 12–16)
IMM GRANULOCYTES # BLD AUTO: 0.04 K/UL (ref 0–0.11)
IMM GRANULOCYTES NFR BLD AUTO: 0.4 % (ref 0–0.9)
LYMPHOCYTES # BLD AUTO: 1.42 K/UL (ref 1–4.8)
LYMPHOCYTES NFR BLD: 14 % (ref 22–41)
MAGNESIUM SERPL-MCNC: 1.4 MG/DL (ref 1.5–2.5)
MCH RBC QN AUTO: 31.5 PG (ref 27–33)
MCHC RBC AUTO-ENTMCNC: 32.5 G/DL (ref 32.2–35.5)
MCV RBC AUTO: 97.1 FL (ref 81.4–97.8)
MONOCYTES # BLD AUTO: 1 K/UL (ref 0–0.85)
MONOCYTES NFR BLD AUTO: 9.9 % (ref 0–13.4)
NEUTROPHILS # BLD AUTO: 7.37 K/UL (ref 1.82–7.42)
NEUTROPHILS NFR BLD: 72.9 % (ref 44–72)
NRBC # BLD AUTO: 0 K/UL
NRBC BLD-RTO: 0 /100 WBC (ref 0–0.2)
PLATELET # BLD AUTO: 536 K/UL (ref 164–446)
PMV BLD AUTO: 10.2 FL (ref 9–12.9)
POTASSIUM SERPL-SCNC: 3 MMOL/L (ref 3.6–5.5)
PROT SERPL-MCNC: 5.8 G/DL (ref 6–8.2)
RBC # BLD AUTO: 2.79 M/UL (ref 4.2–5.4)
SODIUM SERPL-SCNC: 139 MMOL/L (ref 135–145)
WBC # BLD AUTO: 10.1 K/UL (ref 4.8–10.8)

## 2023-09-11 PROCEDURE — 80053 COMPREHEN METABOLIC PANEL: CPT

## 2023-09-11 PROCEDURE — 83735 ASSAY OF MAGNESIUM: CPT

## 2023-09-11 PROCEDURE — 700102 HCHG RX REV CODE 250 W/ 637 OVERRIDE(OP)

## 2023-09-11 PROCEDURE — 36415 COLL VENOUS BLD VENIPUNCTURE: CPT

## 2023-09-11 PROCEDURE — 700111 HCHG RX REV CODE 636 W/ 250 OVERRIDE (IP): Mod: JZ | Performed by: SURGERY

## 2023-09-11 PROCEDURE — 85025 COMPLETE CBC W/AUTO DIFF WBC: CPT

## 2023-09-11 PROCEDURE — 700111 HCHG RX REV CODE 636 W/ 250 OVERRIDE (IP): Performed by: STUDENT IN AN ORGANIZED HEALTH CARE EDUCATION/TRAINING PROGRAM

## 2023-09-11 PROCEDURE — A9270 NON-COVERED ITEM OR SERVICE: HCPCS | Performed by: HOSPITALIST

## 2023-09-11 PROCEDURE — 700105 HCHG RX REV CODE 258

## 2023-09-11 PROCEDURE — 700111 HCHG RX REV CODE 636 W/ 250 OVERRIDE (IP): Mod: JZ | Performed by: INTERNAL MEDICINE

## 2023-09-11 PROCEDURE — 700101 HCHG RX REV CODE 250: Performed by: STUDENT IN AN ORGANIZED HEALTH CARE EDUCATION/TRAINING PROGRAM

## 2023-09-11 PROCEDURE — A9270 NON-COVERED ITEM OR SERVICE: HCPCS

## 2023-09-11 PROCEDURE — 700102 HCHG RX REV CODE 250 W/ 637 OVERRIDE(OP): Performed by: HOSPITALIST

## 2023-09-11 PROCEDURE — 99231 SBSQ HOSP IP/OBS SF/LOW 25: CPT | Performed by: PHYSICIAN ASSISTANT

## 2023-09-11 PROCEDURE — 74176 CT ABD & PELVIS W/O CONTRAST: CPT

## 2023-09-11 PROCEDURE — 700111 HCHG RX REV CODE 636 W/ 250 OVERRIDE (IP): Mod: JZ | Performed by: STUDENT IN AN ORGANIZED HEALTH CARE EDUCATION/TRAINING PROGRAM

## 2023-09-11 PROCEDURE — 770020 HCHG ROOM/CARE - TELE (206)

## 2023-09-11 PROCEDURE — 700111 HCHG RX REV CODE 636 W/ 250 OVERRIDE (IP): Mod: JZ

## 2023-09-11 PROCEDURE — 700105 HCHG RX REV CODE 258: Performed by: STUDENT IN AN ORGANIZED HEALTH CARE EDUCATION/TRAINING PROGRAM

## 2023-09-11 PROCEDURE — 99233 SBSQ HOSP IP/OBS HIGH 50: CPT | Mod: GC | Performed by: INTERNAL MEDICINE

## 2023-09-11 RX ORDER — MAGNESIUM SULFATE HEPTAHYDRATE 40 MG/ML
2 INJECTION, SOLUTION INTRAVENOUS ONCE
Status: COMPLETED | OUTPATIENT
Start: 2023-09-11 | End: 2023-09-11

## 2023-09-11 RX ORDER — POTASSIUM CHLORIDE 20 MEQ/1
40 TABLET, EXTENDED RELEASE ORAL ONCE
Status: COMPLETED | OUTPATIENT
Start: 2023-09-11 | End: 2023-09-11

## 2023-09-11 RX ORDER — MAGNESIUM SULFATE HEPTAHYDRATE 40 MG/ML
2 INJECTION, SOLUTION INTRAVENOUS ONCE
Status: DISCONTINUED | OUTPATIENT
Start: 2023-09-11 | End: 2023-09-11

## 2023-09-11 RX ADMIN — LORAZEPAM 2 MG: 2 TABLET ORAL at 06:44

## 2023-09-11 RX ADMIN — METOPROLOL SUCCINATE 25 MG: 25 TABLET, EXTENDED RELEASE ORAL at 06:06

## 2023-09-11 RX ADMIN — LEVETIRACETAM 500 MG: 500 TABLET, FILM COATED ORAL at 18:26

## 2023-09-11 RX ADMIN — GABAPENTIN 100 MG: 100 CAPSULE ORAL at 12:03

## 2023-09-11 RX ADMIN — AMPICILLIN AND SULBACTAM 3 G: 1; 2 INJECTION, POWDER, FOR SOLUTION INTRAMUSCULAR; INTRAVENOUS at 06:12

## 2023-09-11 RX ADMIN — LORAZEPAM 3 MG: 1 TABLET ORAL at 01:04

## 2023-09-11 RX ADMIN — CHLORDIAZEPOXIDE HYDROCHLORIDE 25 MG: 25 CAPSULE ORAL at 14:43

## 2023-09-11 RX ADMIN — GABAPENTIN 100 MG: 100 CAPSULE ORAL at 06:07

## 2023-09-11 RX ADMIN — CHLORDIAZEPOXIDE HYDROCHLORIDE 25 MG: 25 CAPSULE ORAL at 06:07

## 2023-09-11 RX ADMIN — FOLIC ACID 1 MG: 1 TABLET ORAL at 06:07

## 2023-09-11 RX ADMIN — OXYCODONE HYDROCHLORIDE 5 MG: 5 TABLET ORAL at 01:54

## 2023-09-11 RX ADMIN — ROSUVASTATIN CALCIUM 20 MG: 5 TABLET, FILM COATED ORAL at 18:26

## 2023-09-11 RX ADMIN — LEVETIRACETAM 500 MG: 500 TABLET, FILM COATED ORAL at 06:07

## 2023-09-11 RX ADMIN — GABAPENTIN 100 MG: 100 CAPSULE ORAL at 18:26

## 2023-09-11 RX ADMIN — CHLORDIAZEPOXIDE HYDROCHLORIDE 25 MG: 25 CAPSULE ORAL at 21:09

## 2023-09-11 RX ADMIN — Medication 100 MG: at 06:07

## 2023-09-11 RX ADMIN — OXYCODONE HYDROCHLORIDE 5 MG: 5 TABLET ORAL at 10:24

## 2023-09-11 RX ADMIN — OXYCODONE HYDROCHLORIDE 5 MG: 5 TABLET ORAL at 18:26

## 2023-09-11 RX ADMIN — MIRTAZAPINE 30 MG: 15 TABLET, FILM COATED ORAL at 21:10

## 2023-09-11 RX ADMIN — OMEPRAZOLE 40 MG: 20 CAPSULE, DELAYED RELEASE ORAL at 06:06

## 2023-09-11 RX ADMIN — AMPICILLIN AND SULBACTAM 3 G: 1; 2 INJECTION, POWDER, FOR SOLUTION INTRAMUSCULAR; INTRAVENOUS at 00:24

## 2023-09-11 RX ADMIN — THERA TABS 1 TABLET: TAB at 06:06

## 2023-09-11 RX ADMIN — MAGNESIUM SULFATE HEPTAHYDRATE 2 G: 2 INJECTION, SOLUTION INTRAVENOUS at 08:34

## 2023-09-11 RX ADMIN — SODIUM CHLORIDE: 9 INJECTION, SOLUTION INTRAVENOUS at 19:35

## 2023-09-11 RX ADMIN — AMPICILLIN AND SULBACTAM 3 G: 1; 2 INJECTION, POWDER, FOR SOLUTION INTRAMUSCULAR; INTRAVENOUS at 14:52

## 2023-09-11 RX ADMIN — VANCOMYCIN HYDROCHLORIDE 125 MG: 5 INJECTION, POWDER, LYOPHILIZED, FOR SOLUTION INTRAVENOUS at 11:41

## 2023-09-11 RX ADMIN — POTASSIUM CHLORIDE 40 MEQ: 1500 TABLET, EXTENDED RELEASE ORAL at 10:24

## 2023-09-11 RX ADMIN — SODIUM CHLORIDE: 9 INJECTION, SOLUTION INTRAVENOUS at 05:23

## 2023-09-11 RX ADMIN — AMPICILLIN AND SULBACTAM 3 G: 1; 2 INJECTION, POWDER, FOR SOLUTION INTRAMUSCULAR; INTRAVENOUS at 18:29

## 2023-09-11 RX ADMIN — OXYCODONE HYDROCHLORIDE 5 MG: 5 TABLET ORAL at 06:07

## 2023-09-11 RX ADMIN — MAGNESIUM SULFATE HEPTAHYDRATE 2 G: 2 INJECTION, SOLUTION INTRAVENOUS at 11:44

## 2023-09-11 ASSESSMENT — COGNITIVE AND FUNCTIONAL STATUS - GENERAL
CLIMB 3 TO 5 STEPS WITH RAILING: A LITTLE
DAILY ACTIVITIY SCORE: 17
DRESSING REGULAR LOWER BODY CLOTHING: A LITTLE
DRESSING REGULAR UPPER BODY CLOTHING: A LITTLE
SUGGESTED CMS G CODE MODIFIER MOBILITY: CJ
STANDING UP FROM CHAIR USING ARMS: A LITTLE
WALKING IN HOSPITAL ROOM: A LITTLE
TOILETING: A LITTLE
MOBILITY SCORE: 21
EATING MEALS: A LITTLE
SUGGESTED CMS G CODE MODIFIER DAILY ACTIVITY: CK
PERSONAL GROOMING: A LITTLE
HELP NEEDED FOR BATHING: A LOT

## 2023-09-11 ASSESSMENT — LIFESTYLE VARIABLES
VISUAL DISTURBANCES: NOT PRESENT
AGITATION: NORMAL ACTIVITY
AUDITORY DISTURBANCES: NOT PRESENT
ANXIETY: NO ANXIETY (AT EASE)
TOTAL SCORE: VERY MILD ITCHING, PINS AND NEEDLES SENSATION, BURNING OR NUMBNESS
NAUSEA AND VOMITING: NO NAUSEA AND NO VOMITING
NAUSEA AND VOMITING: NO NAUSEA AND NO VOMITING
AUDITORY DISTURBANCES: NOT PRESENT
AGITATION: MODERATELY FIDGETY AND RESTLESS
PAROXYSMAL SWEATS: NO SWEAT VISIBLE
PAROXYSMAL SWEATS: NO SWEAT VISIBLE
HEADACHE, FULLNESS IN HEAD: MILD
ORIENTATION AND CLOUDING OF SENSORIUM: CANNOT DO SERIAL ADDITIONS OR IS UNCERTAIN ABOUT DATE
AUDITORY DISTURBANCES: NOT PRESENT
PAROXYSMAL SWEATS: NO SWEAT VISIBLE
TREMOR: TREMOR NOT VISIBLE BUT CAN BE FELT, FINGERTIP TO FINGERTIP
HEADACHE, FULLNESS IN HEAD: NOT PRESENT
TREMOR: *
VISUAL DISTURBANCES: NOT PRESENT
TOTAL SCORE: 1
PAROXYSMAL SWEATS: NO SWEAT VISIBLE
VISUAL DISTURBANCES: NOT PRESENT
HEADACHE, FULLNESS IN HEAD: MODERATE
VISUAL DISTURBANCES: NOT PRESENT
VISUAL DISTURBANCES: NOT PRESENT
NAUSEA AND VOMITING: NO NAUSEA AND NO VOMITING
ANXIETY: *
NAUSEA AND VOMITING: NO NAUSEA AND NO VOMITING
PAROXYSMAL SWEATS: NO SWEAT VISIBLE
TOTAL SCORE: 1
VISUAL DISTURBANCES: NOT PRESENT
ANXIETY: MODERATELY ANXIOUS OR GUARDED, SO ANXIETY IS INFERRED
TREMOR: *
TOTAL SCORE: 12
AGITATION: NORMAL ACTIVITY
PAROXYSMAL SWEATS: NO SWEAT VISIBLE
AGITATION: *
ORIENTATION AND CLOUDING OF SENSORIUM: ORIENTED AND CAN DO SERIAL ADDITIONS
TREMOR: *
PAROXYSMAL SWEATS: NO SWEAT VISIBLE
AGITATION: NORMAL ACTIVITY
TREMOR: *
AUDITORY DISTURBANCES: NOT PRESENT
TOTAL SCORE: 0
NAUSEA AND VOMITING: NO NAUSEA AND NO VOMITING
TOTAL SCORE: 15
TOTAL SCORE: 6
NAUSEA AND VOMITING: NO NAUSEA AND NO VOMITING
AGITATION: NORMAL ACTIVITY
HEADACHE, FULLNESS IN HEAD: NOT PRESENT
ORIENTATION AND CLOUDING OF SENSORIUM: ORIENTED AND CAN DO SERIAL ADDITIONS
AGITATION: *
TREMOR: TREMOR NOT VISIBLE BUT CAN BE FELT, FINGERTIP TO FINGERTIP
ANXIETY: NO ANXIETY (AT EASE)
TREMOR: NO TREMOR
HEADACHE, FULLNESS IN HEAD: NOT PRESENT
HEADACHE, FULLNESS IN HEAD: NOT PRESENT
AUDITORY DISTURBANCES: NOT PRESENT
ORIENTATION AND CLOUDING OF SENSORIUM: ORIENTED AND CAN DO SERIAL ADDITIONS
NAUSEA AND VOMITING: NO NAUSEA AND NO VOMITING
AUDITORY DISTURBANCES: NOT PRESENT
ORIENTATION AND CLOUDING OF SENSORIUM: ORIENTED AND CAN DO SERIAL ADDITIONS
ANXIETY: NO ANXIETY (AT EASE)
ORIENTATION AND CLOUDING OF SENSORIUM: ORIENTED AND CAN DO SERIAL ADDITIONS
VISUAL DISTURBANCES: VERY MILD SENSITIVITY
ANXIETY: NO ANXIETY (AT EASE)
TOTAL SCORE: 3
ANXIETY: *
HEADACHE, FULLNESS IN HEAD: NOT PRESENT
AUDITORY DISTURBANCES: NOT PRESENT
ORIENTATION AND CLOUDING OF SENSORIUM: ORIENTED AND CAN DO SERIAL ADDITIONS

## 2023-09-11 ASSESSMENT — PATIENT HEALTH QUESTIONNAIRE - PHQ9
1. LITTLE INTEREST OR PLEASURE IN DOING THINGS: NOT AT ALL
2. FEELING DOWN, DEPRESSED, IRRITABLE, OR HOPELESS: NOT AT ALL
SUM OF ALL RESPONSES TO PHQ9 QUESTIONS 1 AND 2: 0

## 2023-09-11 ASSESSMENT — FIBROSIS 4 INDEX: FIB4 SCORE: 0.35

## 2023-09-11 ASSESSMENT — ENCOUNTER SYMPTOMS
CHILLS: 0
VOMITING: 0
NAUSEA: 0
FEVER: 0
ABDOMINAL PAIN: 1

## 2023-09-11 ASSESSMENT — PAIN DESCRIPTION - PAIN TYPE
TYPE: ACUTE PAIN

## 2023-09-11 NOTE — CARE PLAN
The patient is Watcher - Medium risk of patient condition declining or worsening    Shift Goals  Clinical Goals: CIWA, pain management, remain safe and free from falls,  Patient Goals: Pain management  Family Goals: KUN    Progress made toward(s) clinical / shift goals:        Problem: Seizure Precautions  Goal: Implementation of seizure precautions  Outcome: Progressing  Note: Seizure precautions in place inlcuding bed alarm on, bed is locked and in lowest position, padded side rails that are up, continuous pulse ox in use, IV access via ultra sound guided 20 g to (L) FA, decreased stimuli in room.      Problem: Fall Risk  Goal: Patient will remain free from falls  Outcome: Progressing  Note: Patient remains free from falls. Patient has bed alarm in place and on. Call light in hand. Bed locked and in lowest position.        Patient is not progressing towards the following goals:      Problem: Pain - Standard  Goal: Alleviation of pain or a reduction in pain to the patient’s comfort goal  Outcome: Not Met  Note: Patient complains of constant pain in abdominal and neck area. Per patient she has chronic neck pain. Continuing to need PO and IV pain medications. Repositioned used to help reduce pain.      Problem: Knowledge Deficit - Standard  Goal: Patient and family/care givers will demonstrate understanding of plan of care, disease process/condition, diagnostic tests and medications  Outcome: Not Met  Note: Patient mentation wax and wean. Patient needed re-education on reason for admission, medication being administered, and plan of care. Patient is currently NPO and need re-education not reason for NPO. Patient continues to ask for food and water

## 2023-09-11 NOTE — PROGRESS NOTES
Monitor summary:     Rhythm : SR/ST  Rate : 100-111  Ectopy : None    WI=.16  QRS=.05  QT=.32        Per telemetry strip summary from monitor room.

## 2023-09-11 NOTE — PROGRESS NOTES
[facility] Internal Medicine Daily Progress Note    Date of Service  9/11/2023    [facility] Team: R IM Blue Team   Attending: Norberto Mi M.d.  Senior Resident: Dr. Trae Dickson  Intern: Dr. Armando Reyes Yparraguirre  Contact Number: [phone]    Chief Complaint  Odalys Keenan is a 67 y.o. female admitted 9/9/2023 with abdominal pain.    Hospital Course  Odalys Keenan is a 67 y.o. female with past medical history of Crohn disease, hypertension, hyperlipidemia, alcohol abuse, and opiate abuse, who presented 9/9/2023 complaining of right upper quadrant pain along with nausea and vomiting for the last 3 weeks. Patient was found to have leukocytosis of 13,000, ultrasound showed gallbladder sludge with gallbladder wall thickening with no stones small quantity of perihepatic ascites could be bile leak from cholecystostomy tube.     Patient had a recent patient in 8/25 for acute cholecystitis and pancolitis-positive for C. difficile A&B toxin.  CT and HIDA scan were consistent with chronic cholecystitis, treated with IV Zosyn, oral vancomycin, IV Flagyl.  Patient underwent CT-guided gallbladder drainage 80/27 by IR.  Per ID she was transition to IV cefepime plus Flagyl end date of 9/2/2023.  Gallbladder drainage culture positive for lactobacillus.  Gastroenterology was also consulted and recommended colonoscopy as outpatient when infection has resolved, and consideration for treatment of Crohn's disease then.  They are had recommended to drain to remain in place for 6 weeks.     Pt admitted also for alcohol withdrawal syndrome, patient started on CIWA protocol, have started patient on IV ceftriaxone and Flagyl. Surgery was consulted, not a candidate for surgical management and recommended cholecystostomy tube replaced while her alcohol withdrawal is being treated.     Interval Problem Update  Surgery was consulted, not a candidate for surgical management and recommended cholecystostomy tube replaced while  her alcohol withdrawal is being treated.   No overnight events.   VS tachycardia in the 100s, SBP 150s/70s, on room air.   Patient complains of persistent abdominal abdominal pain in the RUQ, although improved compared to admission. No N or V since yesterday but had 3 episodes of watery diarrhea between 7 and 11 AM. Has a PMH of IBS.  Will start Imodium as needed.  CIWAs still around 12.    I have discussed this patient's plan of care and discharge plan at IDT rounds today with Case Management, Nursing, Nursing leadership, and other members of the IDT team.    Consultants/Specialty: None    Code Status  Full Code    Disposition  Not medically clear.   Dispo TBD.   I have placed the appropriate orders for post-discharge needs.    Physical Exam  Temp:  [36.1 °C (97 °F)-37.4 °C (99.4 °F)] 36.1 °C (97 °F)  Pulse:  [] 107  Resp:  [18] 18  BP: (131-151)/(73-90) 151/90  SpO2:  [92 %-97 %] 97 %    Physical Exam  Constitutional:       Appearance: She is ill-appearing.   HENT:      Head: Normocephalic and atraumatic.      Mouth/Throat:      Mouth: Mucous membranes are dry.   Eyes:      Extraocular Movements: Extraocular movements intact.      Conjunctiva/sclera: Conjunctivae normal.      Pupils: Pupils are equal, round, and reactive to light.   Cardiovascular:      Rate and Rhythm: Regular rhythm. Tachycardia present.      Heart sounds: No murmur heard.     Comments: Hyperdynamic.  Pulmonary:      Effort: Pulmonary effort is normal.      Breath sounds: Normal breath sounds.   Abdominal:      General: Abdomen is flat.      Comments: Increased bowel sounds, covered wound (no bleeding or discharge) in RUQ, surrounding tenderness with no rebound or guarding.    Musculoskeletal:         General: Normal range of motion.      Cervical back: Normal range of motion and neck supple.   Skin:     General: Skin is dry.      Capillary Refill: Capillary refill takes less than 2 seconds.   Neurological:      General: No focal deficit  present.      Mental Status: She is alert. Mental status is at baseline.         Fluids    Intake/Output Summary (Last 24 hours) at 9/11/2023 1831  Last data filed at 9/11/2023 1522  Gross per 24 hour   Intake 1209.53 ml   Output 775 ml   Net 434.53 ml       Laboratory  Recent Labs     09/09/23  0649 09/10/23  0348 09/11/23  0439   WBC 13.7* 9.5 10.1   RBC 2.94* 2.95* 2.79*   HEMOGLOBIN 9.3* 9.0* 8.8*   HEMATOCRIT 28.4* 29.0* 27.1*   MCV 96.6 98.3* 97.1   MCH 31.6 30.5 31.5   MCHC 32.7 31.0* 32.5   RDW 48.8 49.8 48.9   PLATELETCT 689* 607* 536*   MPV 9.9 10.7 10.2     Recent Labs     09/09/23  0649 09/10/23  0348 09/11/23  0439   SODIUM 134* 138 139   POTASSIUM 4.1 3.9 3.0*   CHLORIDE 102 106 104   CO2 22 22 23   GLUCOSE 129* 108* 89   BUN 13 7* 4*   CREATININE 0.57 0.50 0.46*   CALCIUM 8.9 8.8 8.1*     Recent Labs     09/09/23  1141   INR 1.12               Imaging  CT-ABDOMEN-PELVIS W/O   Final Result      1.  New subcapsular fluid collection anterior to the medial segment LEFT lobe liver with small amount of fluid in the RIGHT upper abdomen and dependently in the pelvis.   2.  Mildly distended gallbladder.   3.  Rectosigmoid colonic wall thickening suggesting inflammation.   4.  Increased bowel gas, likely indicating ileus.   5.  No bowel obstruction or perforation.   6.  Small bilateral pleural effusions with associated atelectasis, worse on the RIGHT.      US-RUQ   Final Result         1.  Gallbladder sludge with reported gallbladder wall thickening but without visualized shadowing stones, compatible with known history of cholecystitis.   2.  Small quantity of perihepatic ascites, could represent bile leak from cholecystostomy tube   3.  Atherosclerosis      IR-DRAIN-CHOLECTYSTOSTOMY    (Results Pending)        Assessment/Plan:     * Chronic cholecystitis  Assessment & Plan  Noted last admission. CT and HIDA scan were consistent with chronic cholecystitis, treated with IV Zosyn, oral vancomycin, IV Flagyl.   Patient underwent CT-guided gallbladder drainage 80/27 by IR.  Per ID she was transitioned to IV cefepime plus Flagyl end date of 9/2/2023. Readmitted with abdominal pain and US showing pericholecystic fluid-leaking. Patient removed drain.   - IR evaluated, to place new RUQ drain  if more stable from withdrawal  -Unasyn until completing 5 days  - Monitor abdomen and SIRS      SIRS (systemic inflammatory response syndrome) (HCC)  Assessment & Plan  SIRS criteria identified on my evaluation include:  Tachycardia, with heart rate greater than 90 BPM and Leukocytosis, with WBC greater than 12,000  Likely from cholecystitis and infectious diarrhea.   - On unasyn  - IVF has received 3 lt      Diarrhea- (present on admission)  Assessment & Plan  Likely multifactorial, acute on chronic. Likely from cholecystitis.  C. difficile panel was negative but diarrhea persists.  -Will start vancomycin as C. difficile prophylaxis per ID recommendation as long as she is receiving Unasyn.  - IVF   - Monitor     Acute pancreatitis  Assessment & Plan  Abdominal pain and increased lipase on admission. Abdominal pain and dehydration also likely from cholecystitis.   -  cc/h and bolus PRN, NPO for now  - Monitor clinically    Anemia  Assessment & Plan  Iron panel compatible with inflammatory anemia. No ongoing bleeding.   - Monitor     Hypomagnesemia- (present on admission)  Assessment & Plan  Replacing as needed.    Paroxysmal atrial fibrillation (HCC)- (present on admission)  Assessment & Plan  On episode on last admission due to infection and withdrawal. Here in sinus tarchycardia in the setting of infection and dehydration.   - DVT pp for now, continue assessing need for anticoagulation  - Telemetry    Crohn's disease (HCC)- (present on admission)  Assessment & Plan  Per patient report.  C. difficile panel was negative.    Hyperglycemia- (present on admission)  Assessment & Plan  A1c wnl.   - Monitor     Alcohol withdrawal  delirium (HCC)- (present on admission)  Assessment & Plan  CIWAs 12.  -Continue CIWA protocol  -Continue Librium 25 mg q8h.  Will try to taper the following days  - Monitor electrolytes  - Patient denies ongoing alcohol consumption, needs further counseling   - Restrains as needed    Dyslipidemia- (present on admission)  Assessment & Plan  Check lipid panel in a.m.    Seizures (HCC)- (present on admission)  Assessment & Plan  Continue Keppra  Seizure precautions    Anxiety and depression- (present on admission)  Assessment & Plan  At baseline.   - Monitoring    Elevated liver enzymes- (present on admission)  Assessment & Plan  Alkaline phosphate 292, likely related to acute/chronic cholecystitis.   - Monitor  - See plan for cholecystitis       VTE prophylaxis: enoxaparin ppx hold for procedure    I have performed a physical exam and reviewed and updated ROS and Plan today (9/11/2023). In review of yesterday's note (9/10/2023), there are no changes except as documented above.

## 2023-09-11 NOTE — CARE PLAN
The patient is Unstable - High likelihood or risk of patient condition declining or worsening    Shift Goals  Clinical Goals: CIWA, pt safety  Patient Goals: pain control  Family Goals: NA    Progress made toward(s) clinical / shift goals:    Problem: Optimal Care for Alcohol Withdrawal  Goal: Optimal Care for the alcohol withdrawal patient  Description: Target End Date:  1 to 3 days    1.  Alcohol history screening done on admission  2.  CIWA-AR score assessment (includes assessment of nausea/vomiting, tremor, sweats, anxiety, agitation, tactile, visual and auditory disturbances, headache and orientation/sensorium).  Document on CIWA flowsheet.  3.  Follow CIWA-AR score protocol  4.  Frequent reorientation  5.  Monitor for fluid and electrolyte imbalance.  6.  Assess for respiratory depression due to sedation (pulse ox)  7.  Consider thiamine, multivitamins, folic acid and magnesium sulfate per provider order  8.  Collaborate with Social Workers/Case Management  9.  Collaborate with mental health  Outcome: Progressing     Problem: Seizure Precautions  Goal: Implementation of seizure precautions  Description: Target End Date:  Prior to discharge or change in level of care    1.  Padded side rails up at all times  2.  Suction equipment and oxygen delivery system at bedside  3.  Continuous pulse oximeter in use  4.  Implement fall precautions, bed alarm on, bed in lowest position  5.  IV access (per order)  6.  Provide low stimulus environment, avoid exposure to triggers  7.  Instruct patient to use call light/seizure button if having warning signs of impending seizure  Outcome: Progressing       Patient is not progressing towards the following goals:      Problem: Knowledge Deficit - Standard  Goal: Patient and family/care givers will demonstrate understanding of plan of care, disease process/condition, diagnostic tests and medications  Description: Target End Date:  1-3 days or as soon as patient condition  allows    Document in Patient Education    1.  Patient and family/caregiver oriented to unit, equipment, visitation policy and means for communicating concern  2.  Complete/review Learning Assessment  3.  Assess knowledge level of disease process/condition, treatment plan, diagnostic tests and medications  4.  Explain disease process/condition, treatment plan, diagnostic tests and medications  Outcome: Not Progressing     Problem: Lifestyle Changes  Goal: Patient's ability to identify lifestyle changes and available resources to help reduce recurrence of condition will improve  Description: Target End Date:  1 to 3 days    1.  Discuss recommended lifestyle changes  2.  Identify available resources and support systems  3.  Consider referral to substance abuse program  Outcome: Not Progressing

## 2023-09-11 NOTE — CARE PLAN
The patient is Watcher - Medium risk of patient condition declining or worsening    Shift Goals  Clinical Goals: CIWA, pain management, Safety  Patient Goals: Pain management  Family Goals: KUN    Progress made toward(s) clinical / shift goals:      Problem: Safety - Medical Restraint  Goal: Remains free of injury from restraints (Restraint for Interference with Medical Device)  Description: INTERVENTIONS:  1. Determine that other, less restrictive measures have been tried or would not be effective before applying the restraint  2. Evaluate the patient's condition at the time of restraint application  3. Educate patient/family regarding the reason for restraint  4. Q2H: Monitor safety, psychosocial status, comfort, circulation, respiratory status, LOC, nutrition and hydration  Outcome: Progressing  Note: Evaluated the need for restraints every 2 hours. Patient was agitated and anxious throughout the night. Patient also stated that she was having some visual hallucinations. Performed range of motion every two hours. Offered toileting, checked circulation and respiratory status. Used oral swabs to help patient as she stated she was very thirsty but she is currently NPO with sips with medications. While providing care in the room will release patient from restraints so she could perform ADLs. Educated patient on reasoning behind restraints and what would need to happen in order to have the restraints removed.        Patient is not progressing towards the following goals:      Problem: Pain - Standard  Goal: Alleviation of pain or a reduction in pain to the patient’s comfort goal  Description: Target End Date:  Prior to discharge or change in level of care    Document on Vitals flowsheet    1.  Document pain using the appropriate pain scale per order or unit policy  2.  Educate and implement non-pharmacologic comfort measures (i.e. relaxation, distraction, massage, cold/heat therapy, etc.)  3.  Pain management  medications as ordered  4.  Reassess pain after pain med administration per policy  5.  If opiods administered assess patient's response to pain medication is appropriate per POSS sedation scale  6.  Follow pain management plan developed in collaboration with patient and interdisciplinary team (including palliative care or pain specialists if applicable)  Outcome: Not Progressing  Note: Patient's current pain regimen consists of oxycodone and dilaudid every 3 hours. Patient stated all night that her pain was a 10/10. Encouraged patient to use nonpharmacologic comfort measures but patient kept stating that all she needed was pain medication. Educated patient on the importance of following the pain medication regimen as prescribed.

## 2023-09-11 NOTE — PROGRESS NOTES
Patient back in room from CT. Continuous pulse ox hooked up, restraints in place, call light in hand. Patient awake and talk to this nurse.

## 2023-09-11 NOTE — PROGRESS NOTES
Bedside shift report received from PIERO العراقي. Patient sitting up in bed yelling profanity at bedside RN. RN bedside checking circulation and orientation. Patient medicated per [facility] protocol.

## 2023-09-12 ENCOUNTER — APPOINTMENT (OUTPATIENT)
Dept: RADIOLOGY | Facility: MEDICAL CENTER | Age: 68
DRG: 896 | End: 2023-09-12
Payer: MEDICARE

## 2023-09-12 LAB
ALBUMIN SERPL BCP-MCNC: 2.6 G/DL (ref 3.2–4.9)
ALBUMIN/GLOB SERPL: 0.9 G/DL
ALP SERPL-CCNC: 266 U/L (ref 30–99)
ALT SERPL-CCNC: 7 U/L (ref 2–50)
ANION GAP SERPL CALC-SCNC: 18 MMOL/L (ref 7–16)
AST SERPL-CCNC: 12 U/L (ref 12–45)
BACTERIA WND AEROBE CULT: NORMAL
BASOPHILS # BLD AUTO: 0.7 % (ref 0–1.8)
BASOPHILS # BLD: 0.08 K/UL (ref 0–0.12)
BILIRUB SERPL-MCNC: 0.3 MG/DL (ref 0.1–1.5)
BUN SERPL-MCNC: 3 MG/DL (ref 8–22)
CALCIUM ALBUM COR SERPL-MCNC: 9.7 MG/DL (ref 8.5–10.5)
CALCIUM SERPL-MCNC: 8.6 MG/DL (ref 8.5–10.5)
CHLORIDE SERPL-SCNC: 104 MMOL/L (ref 96–112)
CHOLEST SERPL-MCNC: 161 MG/DL (ref 100–199)
CO2 SERPL-SCNC: 17 MMOL/L (ref 20–33)
CREAT SERPL-MCNC: 0.47 MG/DL (ref 0.5–1.4)
EOSINOPHIL # BLD AUTO: 0.34 K/UL (ref 0–0.51)
EOSINOPHIL NFR BLD: 3.1 % (ref 0–6.9)
ERYTHROCYTE [DISTWIDTH] IN BLOOD BY AUTOMATED COUNT: 47.5 FL (ref 35.9–50)
GFR SERPLBLD CREATININE-BSD FMLA CKD-EPI: 105 ML/MIN/1.73 M 2
GLOBULIN SER CALC-MCNC: 3 G/DL (ref 1.9–3.5)
GLUCOSE SERPL-MCNC: 73 MG/DL (ref 65–99)
GRAM STN SPEC: NORMAL
HCT VFR BLD AUTO: 28.8 % (ref 37–47)
HDLC SERPL-MCNC: 42 MG/DL
HGB BLD-MCNC: 9.2 G/DL (ref 12–16)
IMM GRANULOCYTES # BLD AUTO: 0.03 K/UL (ref 0–0.11)
IMM GRANULOCYTES NFR BLD AUTO: 0.3 % (ref 0–0.9)
LDLC SERPL CALC-MCNC: 99 MG/DL
LYMPHOCYTES # BLD AUTO: 1.47 K/UL (ref 1–4.8)
LYMPHOCYTES NFR BLD: 13.4 % (ref 22–41)
MAGNESIUM SERPL-MCNC: 1.7 MG/DL (ref 1.5–2.5)
MCH RBC QN AUTO: 30.9 PG (ref 27–33)
MCHC RBC AUTO-ENTMCNC: 31.9 G/DL (ref 32.2–35.5)
MCV RBC AUTO: 96.6 FL (ref 81.4–97.8)
MONOCYTES # BLD AUTO: 0.99 K/UL (ref 0–0.85)
MONOCYTES NFR BLD AUTO: 9 % (ref 0–13.4)
NEUTROPHILS # BLD AUTO: 8.03 K/UL (ref 1.82–7.42)
NEUTROPHILS NFR BLD: 73.5 % (ref 44–72)
NRBC # BLD AUTO: 0 K/UL
NRBC BLD-RTO: 0 /100 WBC (ref 0–0.2)
PLATELET # BLD AUTO: 514 K/UL (ref 164–446)
PMV BLD AUTO: 10.6 FL (ref 9–12.9)
POTASSIUM SERPL-SCNC: 3.5 MMOL/L (ref 3.6–5.5)
PROT SERPL-MCNC: 5.6 G/DL (ref 6–8.2)
RBC # BLD AUTO: 2.98 M/UL (ref 4.2–5.4)
SIGNIFICANT IND 70042: NORMAL
SITE SITE: NORMAL
SODIUM SERPL-SCNC: 139 MMOL/L (ref 135–145)
SOURCE SOURCE: NORMAL
TRIGL SERPL-MCNC: 101 MG/DL (ref 0–149)
WBC # BLD AUTO: 10.9 K/UL (ref 4.8–10.8)

## 2023-09-12 PROCEDURE — 80061 LIPID PANEL: CPT

## 2023-09-12 PROCEDURE — 99232 SBSQ HOSP IP/OBS MODERATE 35: CPT | Mod: GC | Performed by: INTERNAL MEDICINE

## 2023-09-12 PROCEDURE — A9270 NON-COVERED ITEM OR SERVICE: HCPCS

## 2023-09-12 PROCEDURE — 700102 HCHG RX REV CODE 250 W/ 637 OVERRIDE(OP): Performed by: HOSPITALIST

## 2023-09-12 PROCEDURE — 85025 COMPLETE CBC W/AUTO DIFF WBC: CPT

## 2023-09-12 PROCEDURE — 80053 COMPREHEN METABOLIC PANEL: CPT

## 2023-09-12 PROCEDURE — 700111 HCHG RX REV CODE 636 W/ 250 OVERRIDE (IP): Performed by: HOSPITALIST

## 2023-09-12 PROCEDURE — 700102 HCHG RX REV CODE 250 W/ 637 OVERRIDE(OP): Performed by: STUDENT IN AN ORGANIZED HEALTH CARE EDUCATION/TRAINING PROGRAM

## 2023-09-12 PROCEDURE — 83735 ASSAY OF MAGNESIUM: CPT

## 2023-09-12 PROCEDURE — 36415 COLL VENOUS BLD VENIPUNCTURE: CPT

## 2023-09-12 PROCEDURE — A9270 NON-COVERED ITEM OR SERVICE: HCPCS | Performed by: HOSPITALIST

## 2023-09-12 PROCEDURE — 99231 SBSQ HOSP IP/OBS SF/LOW 25: CPT | Performed by: SURGERY

## 2023-09-12 PROCEDURE — 700105 HCHG RX REV CODE 258

## 2023-09-12 PROCEDURE — 700105 HCHG RX REV CODE 258: Performed by: STUDENT IN AN ORGANIZED HEALTH CARE EDUCATION/TRAINING PROGRAM

## 2023-09-12 PROCEDURE — 700102 HCHG RX REV CODE 250 W/ 637 OVERRIDE(OP)

## 2023-09-12 PROCEDURE — 700111 HCHG RX REV CODE 636 W/ 250 OVERRIDE (IP): Mod: JZ

## 2023-09-12 PROCEDURE — 700111 HCHG RX REV CODE 636 W/ 250 OVERRIDE (IP): Performed by: STUDENT IN AN ORGANIZED HEALTH CARE EDUCATION/TRAINING PROGRAM

## 2023-09-12 PROCEDURE — 700111 HCHG RX REV CODE 636 W/ 250 OVERRIDE (IP): Mod: JZ | Performed by: STUDENT IN AN ORGANIZED HEALTH CARE EDUCATION/TRAINING PROGRAM

## 2023-09-12 PROCEDURE — 700101 HCHG RX REV CODE 250: Performed by: STUDENT IN AN ORGANIZED HEALTH CARE EDUCATION/TRAINING PROGRAM

## 2023-09-12 PROCEDURE — A9270 NON-COVERED ITEM OR SERVICE: HCPCS | Performed by: STUDENT IN AN ORGANIZED HEALTH CARE EDUCATION/TRAINING PROGRAM

## 2023-09-12 PROCEDURE — 770020 HCHG ROOM/CARE - TELE (206)

## 2023-09-12 RX ORDER — POTASSIUM CHLORIDE 20 MEQ/1
40 TABLET, EXTENDED RELEASE ORAL DAILY
Status: DISCONTINUED | OUTPATIENT
Start: 2023-09-12 | End: 2023-09-14

## 2023-09-12 RX ORDER — OXYCODONE HYDROCHLORIDE 5 MG/1
5 TABLET ORAL
Status: DISCONTINUED | OUTPATIENT
Start: 2023-09-12 | End: 2023-09-14 | Stop reason: HOSPADM

## 2023-09-12 RX ORDER — MAGNESIUM SULFATE HEPTAHYDRATE 40 MG/ML
2 INJECTION, SOLUTION INTRAVENOUS ONCE
Status: COMPLETED | OUTPATIENT
Start: 2023-09-12 | End: 2023-09-12

## 2023-09-12 RX ORDER — HYDROMORPHONE HYDROCHLORIDE 1 MG/ML
0.5 INJECTION, SOLUTION INTRAMUSCULAR; INTRAVENOUS; SUBCUTANEOUS
Status: DISCONTINUED | OUTPATIENT
Start: 2023-09-12 | End: 2023-09-14 | Stop reason: HOSPADM

## 2023-09-12 RX ORDER — CHLORDIAZEPOXIDE HYDROCHLORIDE 25 MG/1
25 CAPSULE, GELATIN COATED ORAL 2 TIMES DAILY
Status: DISCONTINUED | OUTPATIENT
Start: 2023-09-12 | End: 2023-09-13

## 2023-09-12 RX ORDER — OXYCODONE HYDROCHLORIDE 10 MG/1
10 TABLET ORAL
Status: DISCONTINUED | OUTPATIENT
Start: 2023-09-12 | End: 2023-09-14 | Stop reason: HOSPADM

## 2023-09-12 RX ADMIN — GABAPENTIN 100 MG: 100 CAPSULE ORAL at 11:31

## 2023-09-12 RX ADMIN — HYDROMORPHONE HYDROCHLORIDE 0.25 MG: 1 INJECTION, SOLUTION INTRAMUSCULAR; INTRAVENOUS; SUBCUTANEOUS at 07:44

## 2023-09-12 RX ADMIN — FOLIC ACID 1 MG: 1 TABLET ORAL at 04:17

## 2023-09-12 RX ADMIN — AMPICILLIN AND SULBACTAM 3 G: 1; 2 INJECTION, POWDER, FOR SOLUTION INTRAMUSCULAR; INTRAVENOUS at 00:54

## 2023-09-12 RX ADMIN — OMEPRAZOLE 40 MG: 20 CAPSULE, DELAYED RELEASE ORAL at 04:17

## 2023-09-12 RX ADMIN — AMPICILLIN AND SULBACTAM 3 G: 1; 2 INJECTION, POWDER, FOR SOLUTION INTRAMUSCULAR; INTRAVENOUS at 04:33

## 2023-09-12 RX ADMIN — OXYCODONE HYDROCHLORIDE 5 MG: 5 TABLET ORAL at 15:11

## 2023-09-12 RX ADMIN — AMPICILLIN AND SULBACTAM 3 G: 1; 2 INJECTION, POWDER, FOR SOLUTION INTRAMUSCULAR; INTRAVENOUS at 17:17

## 2023-09-12 RX ADMIN — MAGNESIUM SULFATE HEPTAHYDRATE 2 G: 2 INJECTION, SOLUTION INTRAVENOUS at 08:31

## 2023-09-12 RX ADMIN — ROSUVASTATIN CALCIUM 20 MG: 5 TABLET, FILM COATED ORAL at 17:18

## 2023-09-12 RX ADMIN — POTASSIUM CHLORIDE 40 MEQ: 1500 TABLET, EXTENDED RELEASE ORAL at 06:38

## 2023-09-12 RX ADMIN — GABAPENTIN 100 MG: 100 CAPSULE ORAL at 17:18

## 2023-09-12 RX ADMIN — MIRTAZAPINE 30 MG: 15 TABLET, FILM COATED ORAL at 21:01

## 2023-09-12 RX ADMIN — CHLORDIAZEPOXIDE HYDROCHLORIDE 25 MG: 25 CAPSULE ORAL at 17:18

## 2023-09-12 RX ADMIN — OXYCODONE HYDROCHLORIDE 5 MG: 5 TABLET ORAL at 06:37

## 2023-09-12 RX ADMIN — OXYCODONE HYDROCHLORIDE 5 MG: 5 TABLET ORAL at 11:31

## 2023-09-12 RX ADMIN — VANCOMYCIN HYDROCHLORIDE 125 MG: 5 INJECTION, POWDER, LYOPHILIZED, FOR SOLUTION INTRAVENOUS at 04:17

## 2023-09-12 RX ADMIN — OXYCODONE HYDROCHLORIDE 10 MG: 10 TABLET ORAL at 21:34

## 2023-09-12 RX ADMIN — Medication 100 MG: at 04:17

## 2023-09-12 RX ADMIN — LEVETIRACETAM 500 MG: 500 TABLET, FILM COATED ORAL at 17:18

## 2023-09-12 RX ADMIN — THERA TABS 1 TABLET: TAB at 04:17

## 2023-09-12 RX ADMIN — METOPROLOL SUCCINATE 25 MG: 25 TABLET, EXTENDED RELEASE ORAL at 04:17

## 2023-09-12 RX ADMIN — SODIUM CHLORIDE: 9 INJECTION, SOLUTION INTRAVENOUS at 17:16

## 2023-09-12 RX ADMIN — LEVETIRACETAM 500 MG: 500 TABLET, FILM COATED ORAL at 04:19

## 2023-09-12 RX ADMIN — AMPICILLIN AND SULBACTAM 3 G: 1; 2 INJECTION, POWDER, FOR SOLUTION INTRAMUSCULAR; INTRAVENOUS at 11:35

## 2023-09-12 RX ADMIN — OXYCODONE HYDROCHLORIDE 5 MG: 5 TABLET ORAL at 00:17

## 2023-09-12 RX ADMIN — CHLORDIAZEPOXIDE HYDROCHLORIDE 25 MG: 25 CAPSULE ORAL at 04:19

## 2023-09-12 RX ADMIN — GABAPENTIN 100 MG: 100 CAPSULE ORAL at 04:18

## 2023-09-12 RX ADMIN — OXYCODONE HYDROCHLORIDE 10 MG: 10 TABLET ORAL at 18:34

## 2023-09-12 ASSESSMENT — LIFESTYLE VARIABLES
TOTAL SCORE: 3
PAROXYSMAL SWEATS: NO SWEAT VISIBLE
NAUSEA AND VOMITING: NO NAUSEA AND NO VOMITING
ANXIETY: MODERATELY ANXIOUS OR GUARDED, SO ANXIETY IS INFERRED
ANXIETY: MILDLY ANXIOUS
NAUSEA AND VOMITING: NO NAUSEA AND NO VOMITING
ANXIETY: MODERATELY ANXIOUS OR GUARDED, SO ANXIETY IS INFERRED
PAROXYSMAL SWEATS: NO SWEAT VISIBLE
NAUSEA AND VOMITING: NO NAUSEA AND NO VOMITING
TOTAL SCORE: 6
AUDITORY DISTURBANCES: NOT PRESENT
AGITATION: SOMEWHAT MORE THAN NORMAL ACTIVITY
ANXIETY: MODERATELY ANXIOUS OR GUARDED, SO ANXIETY IS INFERRED
TOTAL SCORE: 6
AGITATION: SOMEWHAT MORE THAN NORMAL ACTIVITY
VISUAL DISTURBANCES: NOT PRESENT
TREMOR: TREMOR NOT VISIBLE BUT CAN BE FELT, FINGERTIP TO FINGERTIP
TOTAL SCORE: 7
ORIENTATION AND CLOUDING OF SENSORIUM: ORIENTED AND CAN DO SERIAL ADDITIONS
HEADACHE, FULLNESS IN HEAD: NOT PRESENT
NAUSEA AND VOMITING: NO NAUSEA AND NO VOMITING
TOTAL SCORE: 4
ORIENTATION AND CLOUDING OF SENSORIUM: ORIENTED AND CAN DO SERIAL ADDITIONS
AGITATION: SOMEWHAT MORE THAN NORMAL ACTIVITY
VISUAL DISTURBANCES: NOT PRESENT
TREMOR: *
PAROXYSMAL SWEATS: NO SWEAT VISIBLE
AGITATION: NORMAL ACTIVITY
AGITATION: SOMEWHAT MORE THAN NORMAL ACTIVITY
PAROXYSMAL SWEATS: NO SWEAT VISIBLE
VISUAL DISTURBANCES: NOT PRESENT
NAUSEA AND VOMITING: NO NAUSEA AND NO VOMITING
TREMOR: *
VISUAL DISTURBANCES: NOT PRESENT
ORIENTATION AND CLOUDING OF SENSORIUM: ORIENTED AND CAN DO SERIAL ADDITIONS
TOTAL SCORE: 4
HEADACHE, FULLNESS IN HEAD: NOT PRESENT
ANXIETY: MILDLY ANXIOUS
AUDITORY DISTURBANCES: NOT PRESENT
VISUAL DISTURBANCES: NOT PRESENT
AUDITORY DISTURBANCES: NOT PRESENT
TREMOR: *
ORIENTATION AND CLOUDING OF SENSORIUM: ORIENTED AND CAN DO SERIAL ADDITIONS
ORIENTATION AND CLOUDING OF SENSORIUM: ORIENTED AND CAN DO SERIAL ADDITIONS
AUDITORY DISTURBANCES: NOT PRESENT
PAROXYSMAL SWEATS: NO SWEAT VISIBLE
AGITATION: SOMEWHAT MORE THAN NORMAL ACTIVITY
HEADACHE, FULLNESS IN HEAD: NOT PRESENT
VISUAL DISTURBANCES: NOT PRESENT
AUDITORY DISTURBANCES: NOT PRESENT
AUDITORY DISTURBANCES: NOT PRESENT
HEADACHE, FULLNESS IN HEAD: NOT PRESENT
HEADACHE, FULLNESS IN HEAD: VERY MILD
TREMOR: *
ANXIETY: NO ANXIETY (AT EASE)
NAUSEA AND VOMITING: NO NAUSEA AND NO VOMITING
TREMOR: TREMOR NOT VISIBLE BUT CAN BE FELT, FINGERTIP TO FINGERTIP
PAROXYSMAL SWEATS: NO SWEAT VISIBLE
HEADACHE, FULLNESS IN HEAD: NOT PRESENT
ORIENTATION AND CLOUDING OF SENSORIUM: ORIENTED AND CAN DO SERIAL ADDITIONS

## 2023-09-12 ASSESSMENT — PAIN DESCRIPTION - PAIN TYPE
TYPE: ACUTE PAIN

## 2023-09-12 ASSESSMENT — ENCOUNTER SYMPTOMS
NAUSEA: 0
CHILLS: 0
HEADACHES: 0
ABDOMINAL PAIN: 1
WEAKNESS: 0
FEVER: 0
VOMITING: 0
PALPITATIONS: 0
SHORTNESS OF BREATH: 0

## 2023-09-12 ASSESSMENT — FIBROSIS 4 INDEX: FIB4 SCORE: 0.57

## 2023-09-12 NOTE — DISCHARGE PLANNING
Case Management Discharge Planning    Admission Date: 9/9/2023  GMLOS: 5  ALOS: 2    6-Clicks ADL Score: 17  6-Clicks Mobility Score: 21  PT and/or OT Eval ordered: No  Post-acute Referrals Ordered: No  Post-acute Choice Obtained: No  Has referral(s) been sent to post-acute provider:  No      Anticipated Discharge Dispo: Discharge Disposition: Discharged to home/self care (01)    DME Needed: No    Action(s) Taken: DC Assessment Complete (See below)    Escalations Completed: None    Medically Clear: No    Barriers to Discharge: Medical clearance    **1300  LSW spoke to patient at bedside and confirmed facesheet information. Patient reports she no longer lives in [place], [place]. States she has been staying in [place]. Patient reports independence at baseline with no DME use. Patient reports no social support. Patient states she'll need assistance getting to her car at discharge. Patient could not remember where her car is.

## 2023-09-12 NOTE — PROGRESS NOTES
Received bedside shift report from NOC, RN. Patient in awake in bed watching TV. Call light and personal belonging within reach. Tele box is on. Patient educated on calling for assistance and to not get up alone. Patient requesting pain medications

## 2023-09-12 NOTE — CARE PLAN
The patient is Stable - Low risk of patient condition declining or worsening    Shift Goals  Clinical Goals: CIWA, NPO at midnight  Patient Goals: Rest  Family Goals: KUN    Progress made toward(s) clinical / shift goals:    Problem: Pain - Standard  Goal: Alleviation of pain or a reduction in pain to the patient’s comfort goal  Outcome: Progressing  Note: Medicated per MAR, encouraged to utilize non pharmalogical means of pain relief such as heat pads. Patient able to sleep comfortably through out night.     Problem: Optimal Care for Alcohol Withdrawal  Goal: Optimal Care for the alcohol withdrawal patient  Outcome: Progressing  Note: Patient monitored per CIWA protocol, scoring did not require medication throughout shift.      Problem: Fall Risk  Goal: Patient will remain free from falls  Outcome: Progressing  Note: Patient has remained free of falls this shift. Instructed patient to use call light for help. Call light always placed within reach when leaving room. Patient's room has been cleared of clutter such as cords and extra chairs.

## 2023-09-12 NOTE — PROGRESS NOTES
Monitor tech called this RN and informed me patient was off tele. This nurse assessed patient and patient stated she took them off because they were bothering her. Spoke with primary team to see if we could d/c them, per primary team patient needs to stay on tele. Educated patient on reason for tele monitoring and that they need to stay on her. Monitor check verified they can see patient on monitor

## 2023-09-12 NOTE — PROGRESS NOTES
Radiology Consult  Author: WESTON Martinez Date & Time created: 9/12/2023  2:43 PM   Date of admission  9/9/2023  Note to reader: this note follows the APSO format rather than the historical SOAP format. Assessment and plan located at the top of the note for ease of use.    Chief Complaint  67 y.o. female admitted 9/9/2023 with   Chief Complaint   Patient presents with    Abdominal Pain       HPI  67 y.o. female with past medical history of Crohn disease, hypertension, hyperlipidemia, alcohol abuse, and opiate abuse, who presented 9/9/2023 complaining of right upper quadrant pain along with nausea and vomiting for the last 3 weeks.  Patient was previously admitted on 08/25/2023 for diffuse colitis and distended gallbladder and patient had a cholecystostomy drain placed on 08/27/2023.  CT this admit showed Jessica drain well-positioned.  Shortly after admission, patient became agitated and pulled her Jessica drain out.  Surgery was consulted and recommended cholecystostomy tube be replaced as patient was in active alcohol withdrawal and not a good surgical candidate.      At this time, patient's total bilirubin is 0.3,  alkaline phosphatase is trending down and is currently 266, and WBC is slightly elevated at 10.9 but stable.  CT reviewed by IR Dr. Boston and is more concerning for several fluid collections which could represent bilomas.     Assessment/Plan     Principal Problem:    Chronic cholecystitis  Active Problems:    Elevated liver enzymes    Anxiety and depression    Seizures (HCC)    Dyslipidemia    Alcohol withdrawal delirium (HCC)    Hyperglycemia    Crohn's disease (HCC)    Paroxysmal atrial fibrillation (HCC)    Hypomagnesemia    Anemia    Diarrhea    SIRS (systemic inflammatory response syndrome) (HCC)      Plan IR  - Continue to monitor T. bili and alk phosphatase labs.  - Recommend repeat CT tomorrow to evaluate fluid collections.  - Further recommendations will be provided after follow-up  CT    Thank you for allowing Interventional Radiology team to participate in the patients care, if any additonal care or requests are needed in the future please do not hesitate call or place IR order      [id number]       Review of Systems  Physical Exam   Review of Systems   Constitutional:  Positive for malaise/fatigue. Negative for chills and fever.   Respiratory:  Negative for shortness of breath.    Cardiovascular:  Negative for chest pain and palpitations.   Gastrointestinal:  Positive for abdominal pain. Negative for nausea and vomiting.   Neurological:  Negative for weakness and headaches.      Vitals:    09/12/23 1138   BP: 129/89   Pulse: (!) 102   Resp: 19   Temp: 36.2 °C (97.1 °F)   SpO2: 95%      Physical Exam  Constitutional:       Appearance: Normal appearance.   Cardiovascular:      Rate and Rhythm: Tachycardia present.   Pulmonary:      Effort: Pulmonary effort is normal.   Abdominal:      Tenderness: There is abdominal tenderness.   Skin:     General: Skin is warm and dry.   Neurological:      General: No focal deficit present.      Mental Status: She is alert and oriented to person, place, and time.   Psychiatric:         Mood and Affect: Mood is anxious.             Labs    Recent Labs     09/10/23  0348 09/11/23  0439 09/12/23  0328   WBC 9.5 10.1 10.9*   RBC 2.95* 2.79* 2.98*   HEMOGLOBIN 9.0* 8.8* 9.2*   HEMATOCRIT 29.0* 27.1* 28.8*   MCV 98.3* 97.1 96.6   MCH 30.5 31.5 30.9   MCHC 31.0* 32.5 31.9*   RDW 49.8 48.9 47.5   PLATELETCT 607* 536* 514*   MPV 10.7 10.2 10.6     Recent Labs     09/10/23  0348 09/11/23  0439 09/12/23  0328   SODIUM 138 139 139   POTASSIUM 3.9 3.0* 3.5*   CHLORIDE 106 104 104   CO2 22 23 17*   GLUCOSE 108* 89 73   BUN 7* 4* 3*   CREATININE 0.50 0.46* 0.47*   CALCIUM 8.8 8.1* 8.6     CT-ABDOMEN-PELVIS W/O   Final Result      1.  New subcapsular fluid collection anterior to the medial segment LEFT lobe liver with small amount of fluid in the RIGHT upper abdomen and  "dependently in the pelvis.   2.  Mildly distended gallbladder.   3.  Rectosigmoid colonic wall thickening suggesting inflammation.   4.  Increased bowel gas, likely indicating ileus.   5.  No bowel obstruction or perforation.   6.  Small bilateral pleural effusions with associated atelectasis, worse on the RIGHT.      US-RUQ   Final Result         1.  Gallbladder sludge with reported gallbladder wall thickening but without visualized shadowing stones, compatible with known history of cholecystitis.   2.  Small quantity of perihepatic ascites, could represent bile leak from cholecystostomy tube   3.  Atherosclerosis      IR-DRAIN-CHOLECTYSTOSTOMY    (Results Pending)     Recent Labs     09/10/23  0348 09/11/23  0439 09/12/23  0328   SODIUM 138 139 139   POTASSIUM 3.9 3.0* 3.5*   CHLORIDE 106 104 104   CO2 22 23 17*   GLUCOSE 108* 89 73   BUN 7* 4* 3*     INR   Date Value Ref Range Status   09/09/2023 1.12 0.87 - 1.13 Final     Comment:     INR - Non-therapeutic Reference Range: 0.87-1.13  INR - Therapeutic Reference Range: 2.0-4.0       No results found for: \"POCINR\"     Intake/Output Summary (Last 24 hours) at 9/12/2023 1443  Last data filed at 9/12/2023 1241  Gross per 24 hour   Intake 300 ml   Output 1950 ml   Net -1650 ml      Labs not explicitly included in this progress note were reviewed by the author. Radiology/imaging not explicitly included in this progress note was reviewed by the author.     Past Medical History:   Diagnosis Date    Crohn disease (HCC)     HLD (hyperlipidemia)     Hypertension         Home Medications    Medication Sig Taking? Last Dose Authorizing Provider   rosuvastatin (CRESTOR) 20 MG Tab Take 20 mg by mouth every evening. Yes ABOUT 1 WEEK at [facility]   omeprazole (PRILOSEC) 40 MG delayed-release capsule Take 40 mg by mouth every day. Yes 9/8/2023 at AM [facility]   melatonin 5 mg Tab Take 10 mg by mouth at bedtime as needed (Sleep). Yes FEW DAYS AGO at [facility] " [facility]   DULoxetine (CYMBALTA) 60 MG Cap DR Particles delayed-release capsule Take 60 mg by mouth every day. Yes 9/8/2023 at [facility]   HYDROmorphone (DILAUDID) 2 MG Tab Take 1-2 Tablets by mouth 2 times a day as needed for Severe Pain for up to 7 days.  NOT YET STARTED at NOT YET STARTED Trae Moore M.D.   Icy Hot extra-strength 10-30 % Cream topical cream Apply 1 Application topically 4 times a day as needed (to back or abdomen).  ABOUT 1 WEEK at [facility] Geri Nix M.D.   levETIRAcetam (KEPPRA) 500 MG Tab Take 1 Tablet by mouth every 12 hours.  9/8/2023 at PM Geri Nix M.D.   lidocaine (LIDODERM) 5 % Patch Place 3 Patches on the skin every 24 hours.  FEW DAYS AGO at [facility] Geri Nix M.D.   mirtazapine (REMERON) 15 MG Tab Take 2 Tablets by mouth at bedtime.  9/8/2023 at PM Geri Nix M.D.   gabapentin (NEURONTIN) 100 MG Cap Take 1 Capsule by mouth 3 times a day.  9/8/2023 at PM Geri Nix M.D.   metoprolol SR (TOPROL XL) 25 MG TABLET SR 24 HR Take 1 Tablet by mouth every day.  9/8/2023 at AM Geri Nix M.D.   Naloxone (NARCAN) 4 MG/0.1ML Liquid Spray one actuation in one nostril for overdose. Call 911. May repeat in 2 minutes if needed.  PRN at [facility] Geri Nix M.D.   ondansetron (ZOFRAN) 8 MG Tab Take 1 Tablet by mouth every 8 hours as needed for Nausea/Vomiting.  PRN at [facility] Geri Nix M.D.       I have performed a physical exam and reviewed and updated ROS and Plan today (9/12/2023).     45 minutes in directly providing and coordinating care and extensive data review.  No time overlap and excludes procedures.

## 2023-09-13 ENCOUNTER — HOSPITAL ENCOUNTER (OUTPATIENT)
Dept: RADIOLOGY | Facility: MEDICAL CENTER | Age: 68
End: 2023-09-13
Attending: NURSE PRACTITIONER
Payer: MEDICARE

## 2023-09-13 LAB
ALBUMIN SERPL BCP-MCNC: 2.5 G/DL (ref 3.2–4.9)
ALBUMIN/GLOB SERPL: 1 G/DL
ALP SERPL-CCNC: 223 U/L (ref 30–99)
ALT SERPL-CCNC: 8 U/L (ref 2–50)
ANION GAP SERPL CALC-SCNC: 11 MMOL/L (ref 7–16)
AST SERPL-CCNC: 11 U/L (ref 12–45)
BASOPHILS # BLD AUTO: 1.6 % (ref 0–1.8)
BASOPHILS # BLD: 0.09 K/UL (ref 0–0.12)
BILIRUB SERPL-MCNC: 0.2 MG/DL (ref 0.1–1.5)
BUN SERPL-MCNC: 2 MG/DL (ref 8–22)
C DIFF DNA SPEC QL NAA+PROBE: NEGATIVE
C DIFF TOX A+B STL QL IA: NEGATIVE
C DIFF TOX GENS STL QL NAA+PROBE: NORMAL
CALCIUM ALBUM COR SERPL-MCNC: 9.5 MG/DL (ref 8.5–10.5)
CALCIUM SERPL-MCNC: 8.3 MG/DL (ref 8.5–10.5)
CHLORIDE SERPL-SCNC: 107 MMOL/L (ref 96–112)
CO2 SERPL-SCNC: 23 MMOL/L (ref 20–33)
CREAT SERPL-MCNC: 0.49 MG/DL (ref 0.5–1.4)
EOSINOPHIL # BLD AUTO: 0.38 K/UL (ref 0–0.51)
EOSINOPHIL NFR BLD: 6.9 % (ref 0–6.9)
ERYTHROCYTE [DISTWIDTH] IN BLOOD BY AUTOMATED COUNT: 46.8 FL (ref 35.9–50)
GFR SERPLBLD CREATININE-BSD FMLA CKD-EPI: 104 ML/MIN/1.73 M 2
GLOBULIN SER CALC-MCNC: 2.5 G/DL (ref 1.9–3.5)
GLUCOSE SERPL-MCNC: 102 MG/DL (ref 65–99)
HCT VFR BLD AUTO: 26.3 % (ref 37–47)
HGB BLD-MCNC: 8.5 G/DL (ref 12–16)
IMM GRANULOCYTES # BLD AUTO: 0.02 K/UL (ref 0–0.11)
IMM GRANULOCYTES NFR BLD AUTO: 0.4 % (ref 0–0.9)
LYMPHOCYTES # BLD AUTO: 1.46 K/UL (ref 1–4.8)
LYMPHOCYTES NFR BLD: 26.7 % (ref 22–41)
MAGNESIUM SERPL-MCNC: 1.4 MG/DL (ref 1.5–2.5)
MCH RBC QN AUTO: 31 PG (ref 27–33)
MCHC RBC AUTO-ENTMCNC: 32.3 G/DL (ref 32.2–35.5)
MCV RBC AUTO: 96 FL (ref 81.4–97.8)
MONOCYTES # BLD AUTO: 0.73 K/UL (ref 0–0.85)
MONOCYTES NFR BLD AUTO: 13.3 % (ref 0–13.4)
NEUTROPHILS # BLD AUTO: 2.79 K/UL (ref 1.82–7.42)
NEUTROPHILS NFR BLD: 51.1 % (ref 44–72)
NRBC # BLD AUTO: 0 K/UL
NRBC BLD-RTO: 0 /100 WBC (ref 0–0.2)
PLATELET # BLD AUTO: 522 K/UL (ref 164–446)
PMV BLD AUTO: 10.7 FL (ref 9–12.9)
POTASSIUM SERPL-SCNC: 3.6 MMOL/L (ref 3.6–5.5)
PROT SERPL-MCNC: 5 G/DL (ref 6–8.2)
RBC # BLD AUTO: 2.74 M/UL (ref 4.2–5.4)
SODIUM SERPL-SCNC: 141 MMOL/L (ref 135–145)
WBC # BLD AUTO: 5.5 K/UL (ref 4.8–10.8)

## 2023-09-13 PROCEDURE — 770020 HCHG ROOM/CARE - TELE (206)

## 2023-09-13 PROCEDURE — 700102 HCHG RX REV CODE 250 W/ 637 OVERRIDE(OP): Performed by: HOSPITALIST

## 2023-09-13 PROCEDURE — 700117 HCHG RX CONTRAST REV CODE 255: Performed by: NURSE PRACTITIONER

## 2023-09-13 PROCEDURE — 700102 HCHG RX REV CODE 250 W/ 637 OVERRIDE(OP): Performed by: STUDENT IN AN ORGANIZED HEALTH CARE EDUCATION/TRAINING PROGRAM

## 2023-09-13 PROCEDURE — A9270 NON-COVERED ITEM OR SERVICE: HCPCS | Performed by: HOSPITALIST

## 2023-09-13 PROCEDURE — 700111 HCHG RX REV CODE 636 W/ 250 OVERRIDE (IP): Performed by: STUDENT IN AN ORGANIZED HEALTH CARE EDUCATION/TRAINING PROGRAM

## 2023-09-13 PROCEDURE — A9270 NON-COVERED ITEM OR SERVICE: HCPCS | Performed by: STUDENT IN AN ORGANIZED HEALTH CARE EDUCATION/TRAINING PROGRAM

## 2023-09-13 PROCEDURE — 700111 HCHG RX REV CODE 636 W/ 250 OVERRIDE (IP): Mod: JZ

## 2023-09-13 PROCEDURE — 80053 COMPREHEN METABOLIC PANEL: CPT

## 2023-09-13 PROCEDURE — 700111 HCHG RX REV CODE 636 W/ 250 OVERRIDE (IP): Mod: JZ | Performed by: STUDENT IN AN ORGANIZED HEALTH CARE EDUCATION/TRAINING PROGRAM

## 2023-09-13 PROCEDURE — 99232 SBSQ HOSP IP/OBS MODERATE 35: CPT | Mod: GC | Performed by: INTERNAL MEDICINE

## 2023-09-13 PROCEDURE — 87493 C DIFF AMPLIFIED PROBE: CPT

## 2023-09-13 PROCEDURE — 85025 COMPLETE CBC W/AUTO DIFF WBC: CPT

## 2023-09-13 PROCEDURE — 700105 HCHG RX REV CODE 258

## 2023-09-13 PROCEDURE — 700105 HCHG RX REV CODE 258: Performed by: STUDENT IN AN ORGANIZED HEALTH CARE EDUCATION/TRAINING PROGRAM

## 2023-09-13 PROCEDURE — A9270 NON-COVERED ITEM OR SERVICE: HCPCS | Mod: JZ

## 2023-09-13 PROCEDURE — 83735 ASSAY OF MAGNESIUM: CPT

## 2023-09-13 PROCEDURE — 700101 HCHG RX REV CODE 250: Performed by: STUDENT IN AN ORGANIZED HEALTH CARE EDUCATION/TRAINING PROGRAM

## 2023-09-13 PROCEDURE — 700102 HCHG RX REV CODE 250 W/ 637 OVERRIDE(OP): Mod: JZ

## 2023-09-13 PROCEDURE — 74177 CT ABD & PELVIS W/CONTRAST: CPT

## 2023-09-13 PROCEDURE — 99231 SBSQ HOSP IP/OBS SF/LOW 25: CPT | Performed by: NURSE PRACTITIONER

## 2023-09-13 PROCEDURE — 87324 CLOSTRIDIUM AG IA: CPT

## 2023-09-13 RX ORDER — AMOXICILLIN AND CLAVULANATE POTASSIUM 875; 125 MG/1; MG/1
1 TABLET, FILM COATED ORAL EVERY 12 HOURS
Status: DISCONTINUED | OUTPATIENT
Start: 2023-09-13 | End: 2023-09-14 | Stop reason: HOSPADM

## 2023-09-13 RX ORDER — POTASSIUM CHLORIDE 20 MEQ/1
40 TABLET, EXTENDED RELEASE ORAL ONCE
Status: DISCONTINUED | OUTPATIENT
Start: 2023-09-13 | End: 2023-09-13

## 2023-09-13 RX ORDER — MAGNESIUM SULFATE HEPTAHYDRATE 40 MG/ML
2 INJECTION, SOLUTION INTRAVENOUS ONCE
Status: COMPLETED | OUTPATIENT
Start: 2023-09-13 | End: 2023-09-13

## 2023-09-13 RX ADMIN — POTASSIUM CHLORIDE 40 MEQ: 1500 TABLET, EXTENDED RELEASE ORAL at 04:52

## 2023-09-13 RX ADMIN — OXYCODONE HYDROCHLORIDE 10 MG: 10 TABLET ORAL at 01:27

## 2023-09-13 RX ADMIN — GABAPENTIN 100 MG: 100 CAPSULE ORAL at 17:38

## 2023-09-13 RX ADMIN — OXYCODONE HYDROCHLORIDE 5 MG: 5 TABLET ORAL at 07:02

## 2023-09-13 RX ADMIN — SODIUM CHLORIDE: 9 INJECTION, SOLUTION INTRAVENOUS at 04:49

## 2023-09-13 RX ADMIN — GABAPENTIN 100 MG: 100 CAPSULE ORAL at 11:30

## 2023-09-13 RX ADMIN — IOHEXOL 80 ML: 350 INJECTION, SOLUTION INTRAVENOUS at 12:01

## 2023-09-13 RX ADMIN — CHLORDIAZEPOXIDE HYDROCHLORIDE 25 MG: 25 CAPSULE ORAL at 04:52

## 2023-09-13 RX ADMIN — OMEPRAZOLE 40 MG: 20 CAPSULE, DELAYED RELEASE ORAL at 04:52

## 2023-09-13 RX ADMIN — OXYCODONE HYDROCHLORIDE 10 MG: 10 TABLET ORAL at 14:40

## 2023-09-13 RX ADMIN — METOPROLOL SUCCINATE 25 MG: 25 TABLET, EXTENDED RELEASE ORAL at 04:52

## 2023-09-13 RX ADMIN — THERA TABS 1 TABLET: TAB at 04:52

## 2023-09-13 RX ADMIN — GABAPENTIN 100 MG: 100 CAPSULE ORAL at 04:52

## 2023-09-13 RX ADMIN — LEVETIRACETAM 500 MG: 500 TABLET, FILM COATED ORAL at 17:39

## 2023-09-13 RX ADMIN — AMOXICILLIN AND CLAVULANATE POTASSIUM 1 TABLET: 875; 125 TABLET, FILM COATED ORAL at 11:30

## 2023-09-13 RX ADMIN — Medication 100 MG: at 04:52

## 2023-09-13 RX ADMIN — OXYCODONE HYDROCHLORIDE 10 MG: 10 TABLET ORAL at 19:16

## 2023-09-13 RX ADMIN — AMPICILLIN AND SULBACTAM 3 G: 1; 2 INJECTION, POWDER, FOR SOLUTION INTRAMUSCULAR; INTRAVENOUS at 04:51

## 2023-09-13 RX ADMIN — OXYCODONE HYDROCHLORIDE 10 MG: 10 TABLET ORAL at 11:30

## 2023-09-13 RX ADMIN — MAGNESIUM SULFATE HEPTAHYDRATE 2 G: 2 INJECTION, SOLUTION INTRAVENOUS at 08:29

## 2023-09-13 RX ADMIN — AMOXICILLIN AND CLAVULANATE POTASSIUM 1 TABLET: 875; 125 TABLET, FILM COATED ORAL at 17:42

## 2023-09-13 RX ADMIN — MIRTAZAPINE 30 MG: 15 TABLET, FILM COATED ORAL at 20:14

## 2023-09-13 RX ADMIN — AMPICILLIN AND SULBACTAM 3 G: 1; 2 INJECTION, POWDER, FOR SOLUTION INTRAMUSCULAR; INTRAVENOUS at 00:02

## 2023-09-13 RX ADMIN — ROSUVASTATIN CALCIUM 20 MG: 5 TABLET, FILM COATED ORAL at 17:39

## 2023-09-13 RX ADMIN — LEVETIRACETAM 500 MG: 500 TABLET, FILM COATED ORAL at 04:52

## 2023-09-13 RX ADMIN — FOLIC ACID 1 MG: 1 TABLET ORAL at 04:52

## 2023-09-13 RX ADMIN — VANCOMYCIN HYDROCHLORIDE 125 MG: 5 INJECTION, POWDER, LYOPHILIZED, FOR SOLUTION INTRAVENOUS at 04:52

## 2023-09-13 ASSESSMENT — LIFESTYLE VARIABLES
VISUAL DISTURBANCES: NOT PRESENT
TOTAL SCORE: 2
TREMOR: *
PAROXYSMAL SWEATS: NO SWEAT VISIBLE
TOTAL SCORE: 3
AGITATION: NORMAL ACTIVITY
AGITATION: NORMAL ACTIVITY
AUDITORY DISTURBANCES: NOT PRESENT
NAUSEA AND VOMITING: NO NAUSEA AND NO VOMITING
ANXIETY: NO ANXIETY (AT EASE)
ORIENTATION AND CLOUDING OF SENSORIUM: ORIENTED AND CAN DO SERIAL ADDITIONS
AUDITORY DISTURBANCES: NOT PRESENT
VISUAL DISTURBANCES: NOT PRESENT
HEADACHE, FULLNESS IN HEAD: NOT PRESENT
ORIENTATION AND CLOUDING OF SENSORIUM: ORIENTED AND CAN DO SERIAL ADDITIONS
ANXIETY: NO ANXIETY (AT EASE)
NAUSEA AND VOMITING: NO NAUSEA AND NO VOMITING
PAROXYSMAL SWEATS: NO SWEAT VISIBLE
HEADACHE, FULLNESS IN HEAD: NOT PRESENT
TREMOR: *

## 2023-09-13 ASSESSMENT — ENCOUNTER SYMPTOMS
WEAKNESS: 0
FEVER: 0
SHORTNESS OF BREATH: 0
DIZZINESS: 0
ABDOMINAL PAIN: 0
HEADACHES: 0
CHILLS: 0
NAUSEA: 0
MUSCULOSKELETAL NEGATIVE: 1
PALPITATIONS: 0
VOMITING: 0

## 2023-09-13 ASSESSMENT — FIBROSIS 4 INDEX
FIB4 SCORE: 0.49
FIB4 SCORE: 0.57

## 2023-09-13 ASSESSMENT — PAIN DESCRIPTION - PAIN TYPE
TYPE: ACUTE PAIN

## 2023-09-13 NOTE — DOCUMENTATION QUERY
[facility]                                                                       Query Response Note      PATIENT:               WALDO OLVERA  ACCT #:                  [id number]  MRN:                     [id number]  :                      1955  ADMIT DATE:       2023 7:21 PM  DISCH DATE:        2023 8:12 PM  RESPONDING  PROVIDER #:        [id number]           QUERY TEXT:    Patient transferred and admitted 23 to [facility] secondary to encephalopathy, chronic cholecystitis. Sepsis is documented as an active problem on  and listed as an active problem on the discharge summary, however, sepsis is not mentioned in the H&P.  Please confirm/clarify the presence of sepsis at the time of admission.    The patient's Clinical Indicators include:  NOTED    -Transferred from [facility] for placement of cholecystostomy tube (Adm  - 23)  SIRS: Tachycardia, Tachypnea,  Leukocytosis WBC: 24.9 - 28.6 - 10.6  Procalcitonin 1.80 / CRP- 0.85 -0.87   PN & DCS : Active problem: Sepsis    Consult: Sepsis improving    Chronic cholecystitis -CT Drainage Gallbladder  Acute Encephalopathy / Acute respiratory failure   ID consultation/monitoring/treatment (IV abx-Flagyl)   [facility] - Severe sepsis -  C. difficile positive     Thank you,  Catrachita Briseno RN, CCS  Clinical Documentation Integrity  Connect via Voalte  Options provided:   -- Sepsis - POA   -- Sepsis -developed after admission   -- Sepsis is not valid , sepsis resolved prior to  admission   -- Cannot be clinically determined if sepsis was POA   -- Other explanation, Pls specify      Query created by: Catrachita Briseno on 2023 11:06 AM    RESPONSE TEXT:    Sepsis is not valid , sepsis resolved prior to  admission          Electronically signed by:  ROWENA MALDONADO MD 2023 12:33 AM

## 2023-09-13 NOTE — PROGRESS NOTES
CT reviewed by Dr. Fnuk  No acute care surgical plans at this time  She does not need to be NPO for surgery team.

## 2023-09-13 NOTE — PROGRESS NOTES
Radiology Progress Note  Author: WESTON Martinez Date & Time created: 9/13/2023  2:53 PM   Date of admission  9/9/2023  Note to reader: this note follows the APSO format rather than the historical SOAP format. Assessment and plan located at the top of the note for ease of use.    Chief Complaint  67 y.o. female admitted 9/9/2023 with   Chief Complaint   Patient presents with    Abdominal Pain       HPI  67 y.o. female with past medical history of Crohn disease, hypertension, hyperlipidemia, alcohol abuse, and opiate abuse, who presented 9/9/2023 complaining of right upper quadrant pain along with nausea and vomiting for the last 3 weeks.  Patient was previously admitted on 08/25/2023 for diffuse colitis and distended gallbladder and patient had a cholecystostomy drain placed on 08/27/2023.  CT this admit showed Jessica drain well-positioned.  Shortly after admission, patient became agitated and pulled her Jessica drain out.  Surgery was consulted and recommended cholecystostomy tube be replaced as patient was in active alcohol withdrawal and not a good surgical candidate.  Pt's labs were improving.  CT showed several fluid collections suspicious for bilomas.  IR recommendation was to get another CT today.     Interval history:  09/13/23 -WBC 5.5.  Alk phos continues to trend down.  Bili normal.  Follow-up CT shows stable subcapsular fluid collection in the left hepatic lobe and decompressed gallbladder.       Assessment/Plan     Principal Problem:    Chronic cholecystitis  Active Problems:    Elevated liver enzymes    Anxiety and depression    Seizures (HCC)    Dyslipidemia    Alcohol withdrawal delirium (HCC)    Hyperglycemia    Crohn's disease (HCC)    Paroxysmal atrial fibrillation (HCC)    Hypomagnesemia    Anemia    Diarrhea    SIRS (systemic inflammatory response syndrome) (HCC)      Plan IR  - No indication for replacement of cholecystostomy drain at this time  - Stable fluid collection.  No need  for drain at this time.  - IR signing off    Thank you for allowing Interventional Radiology team to participate in the patients care, if any additonal care or requests are needed in the future please do not hesitate call or place IR order   [id number]       Review of Systems  Physical Exam   Review of Systems   Constitutional:  Positive for malaise/fatigue. Negative for chills and fever.   Respiratory:  Negative for shortness of breath.    Cardiovascular:  Negative for chest pain and palpitations.   Gastrointestinal:  Negative for nausea and vomiting.   Neurological:  Negative for weakness and headaches.      Vitals:    09/13/23 1232   BP: 136/69   Pulse: 99   Resp: 18   Temp: 36.3 °C (97.4 °F)   SpO2: 96%      Physical Exam  Constitutional:       Appearance: Normal appearance.   Cardiovascular:      Rate and Rhythm: Normal rate.   Pulmonary:      Effort: Pulmonary effort is normal.   Abdominal:      Comments: Abdominal tenderness improving   Skin:     General: Skin is warm and dry.   Neurological:      General: No focal deficit present.      Mental Status: She is alert and oriented to person, place, and time.             Labs    Recent Labs     09/11/23 0439 09/12/23 0328 09/13/23 0252   WBC 10.1 10.9* 5.5   RBC 2.79* 2.98* 2.74*   HEMOGLOBIN 8.8* 9.2* 8.5*   HEMATOCRIT 27.1* 28.8* 26.3*   MCV 97.1 96.6 96.0   MCH 31.5 30.9 31.0   MCHC 32.5 31.9* 32.3   RDW 48.9 47.5 46.8   PLATELETCT 536* 514* 522*   MPV 10.2 10.6 10.7       Recent Labs     09/11/23 0439 09/12/23 0328 09/13/23  0252   SODIUM 139 139 141   POTASSIUM 3.0* 3.5* 3.6   CHLORIDE 104 104 107   CO2 23 17* 23   GLUCOSE 89 73 102*   BUN 4* 3* 2*   CREATININE 0.46* 0.47* 0.49*   CALCIUM 8.1* 8.6 8.3*       CT-ABDOMEN-PELVIS WITH   Final Result      1.  Stable subcapsular fluid collection anterior to the medial segment of the left hepatic lobe which measures 2.1 x 5.9 cm.   2.  The gallbladder is decompressed and does not demonstrate evidence of significant  "interval increase in pericholecystic or perihepatic fluid.   3.  Redemonstrated rectosigmoid colonic wall thickening which could be seen in the setting of infectious or inflammatory colitis.   4.  Slightly increased small bilateral pleural effusions, right greater than left with associated compressive atelectasis and/or consolidation. Underlying infection is possible.   5.  Stable right-sided subphrenic fluid.      CT-ABDOMEN-PELVIS W/O   Final Result      1.  New subcapsular fluid collection anterior to the medial segment LEFT lobe liver with small amount of fluid in the RIGHT upper abdomen and dependently in the pelvis.   2.  Mildly distended gallbladder.   3.  Rectosigmoid colonic wall thickening suggesting inflammation.   4.  Increased bowel gas, likely indicating ileus.   5.  No bowel obstruction or perforation.   6.  Small bilateral pleural effusions with associated atelectasis, worse on the RIGHT.      US-RUQ   Final Result         1.  Gallbladder sludge with reported gallbladder wall thickening but without visualized shadowing stones, compatible with known history of cholecystitis.   2.  Small quantity of perihepatic ascites, could represent bile leak from cholecystostomy tube   3.  Atherosclerosis      IR-DRAIN-CHOLECTYSTOSTOMY    (Results Pending)       Recent Labs     09/11/23  0439 09/12/23  0328 09/13/23  0252   SODIUM 139 139 141   POTASSIUM 3.0* 3.5* 3.6   CHLORIDE 104 104 107   CO2 23 17* 23   GLUCOSE 89 73 102*   BUN 4* 3* 2*       INR   Date Value Ref Range Status   09/09/2023 1.12 0.87 - 1.13 Final     Comment:     INR - Non-therapeutic Reference Range: 0.87-1.13  INR - Therapeutic Reference Range: 2.0-4.0       No results found for: \"POCINR\"     Intake/Output Summary (Last 24 hours) at 9/12/2023 1443  Last data filed at 9/12/2023 1241  Gross per 24 hour   Intake 300 ml   Output 1950 ml   Net -1650 ml      Labs not explicitly included in this progress note were reviewed by the author. " Radiology/imaging not explicitly included in this progress note was reviewed by the author.     Past Medical History:   Diagnosis Date    Crohn disease (HCC)     HLD (hyperlipidemia)     Hypertension         Home Medications    Medication Sig Taking? Last Dose Authorizing Provider   rosuvastatin (CRESTOR) 20 MG Tab Take 20 mg by mouth every evening. Yes ABOUT 1 WEEK at OUT [facility]   omeprazole (PRILOSEC) 40 MG delayed-release capsule Take 40 mg by mouth every day. Yes 9/8/2023 at AM Physician Outpatient   melatonin 5 mg Tab Take 10 mg by mouth at bedtime as needed (Sleep). Yes FEW DAYS AGO at [facility]   DULoxetine (CYMBALTA) 60 MG Cap DR Particles delayed-release capsule Take 60 mg by mouth every day. Yes 9/8/2023 at PM Physician Outpatient   HYDROmorphone (DILAUDID) 2 MG Tab Take 1-2 Tablets by mouth 2 times a day as needed for Severe Pain for up to 7 days.  NOT YET STARTED at NOT YET STARTED Trae Moore M.D.   Icy Hot extra-strength 10-30 % Cream topical cream Apply 1 Application topically 4 times a day as needed (to back or abdomen).  ABOUT 1 WEEK at [facility] Geri Nix M.D.   levETIRAcetam (KEPPRA) 500 MG Tab Take 1 Tablet by mouth every 12 hours.  9/8/2023 at PM Geri Nix M.D.   lidocaine (LIDODERM) 5 % Patch Place 3 Patches on the skin every 24 hours.  FEW DAYS AGO at [facility] Geri Nix M.D.   mirtazapine (REMERON) 15 MG Tab Take 2 Tablets by mouth at bedtime.  9/8/2023 at PM Geri Nix M.D.   gabapentin (NEURONTIN) 100 MG Cap Take 1 Capsule by mouth 3 times a day.  9/8/2023 at PM Geri Nix M.D.   metoprolol SR (TOPROL XL) 25 MG TABLET SR 24 HR Take 1 Tablet by mouth every day.  9/8/2023 at AM Geri Nix M.D.   Naloxone (NARCAN) 4 MG/0.1ML Liquid Spray one actuation in one nostril for overdose. Call 911. May repeat in 2 minutes if needed.  PRN at PR[facility] Geri Nix M.D.   ondansetron (ZOFRAN) 8 MG Tab Take 1 Tablet by mouth every 8 hours  as needed for Nausea/Vomiting.  PRN at [facility] Geri Nix M.D.       I have performed a physical exam and reviewed and updated ROS and Plan today (9/13/2023).     45 minutes in directly providing and coordinating care and extensive data review.  No time overlap and excludes procedures.

## 2023-09-13 NOTE — PROGRESS NOTES
[facility] Internal Medicine Daily Progress Note    Date of Service  9/13/2023    [facility] Team: R IM Blue Team   Attending: Bettie Rojas M.d.  Senior Resident: Dr. Trae Dickson  Intern: Dr. Armando Reyes Yparraguirre  Contact Number: [phone]    Chief Complaint  Odalys Keenan is a 67 y.o. female with past medical history of Crohn disease, hypertension, hyperlipidemia, alcohol abuse and opiate abuse, admitted 9/9/2023 with abdominal pain, nausea and vomiting in the setting of accidental removal of cholecystostomy tube placed for acalculus cholecystitis on previous admission.    Hospital Course  Odalys Keenan is a 67 y.o. female with past medical history of Crohn disease, hypertension, hyperlipidemia, alcohol abuse, and opiate abuse, who presented 9/9/2023 complaining of right upper quadrant pain along with nausea and vomiting for the last 3 weeks. Patient was found to have leukocytosis of 13,000, ultrasound showed gallbladder sludge with gallbladder wall thickening with no stones small quantity of perihepatic ascites could be bile leak from cholecystostomy tube.     Patient had a recent patient in 8/25 for acute cholecystitis and pancolitis-positive for C. difficile A&B toxin.  CT and HIDA scan were consistent with chronic cholecystitis, treated with IV Zosyn, oral vancomycin, IV Flagyl.  Patient underwent CT-guided gallbladder drainage 80/27 by IR.  Per ID she was transition to IV cefepime plus Flagyl end date of 9/2/2023.  Gallbladder drainage culture positive for lactobacillus.  Gastroenterology was also consulted and recommended colonoscopy as outpatient when infection has resolved, and consideration for treatment of Crohn's disease then.  They are had recommended to drain to remain in place for 6 weeks.     Pt admitted also for alcohol withdrawal syndrome, patient started on CIWA protocol, have started patient on IV ceftriaxone and Flagyl. Surgery was consulted, not a candidate for surgical management  "and recommended cholecystostomy tube replaced while her alcohol withdrawal is being treated.     Interval Problem Update  Patient complains of moderate abdominal pain, worst in the left side. Nursing staff reports multiple diarrhea episodes, watery, nonbloody (\"every 10 minutes\").  Denies fever, chills, nausea, vomiting.    I have discussed this patient's plan of care and discharge plan at IDT rounds today with Case Management, Nursing, Nursing leadership, and other members of the IDT team.    Consultants/Specialty: Surgery, IR    Code Status  Full Code    Disposition  Not medically clear.   Dispo TBD.   I have placed the appropriate orders for post-discharge needs.    Physical Exam  Temp:  [36.2 °C (97.2 °F)-37 °C (98.6 °F)] 36.3 °C (97.4 °F)  Pulse:  [] 99  Resp:  [18] 18  BP: (124-148)/(65-86) 136/69  SpO2:  [92 %-99 %] 96 %    Physical Exam  Constitutional:       Appearance: She is ill-appearing.   HENT:      Head: Normocephalic and atraumatic.      Right Ear: Tympanic membrane, ear canal and external ear normal.      Left Ear: Tympanic membrane, ear canal and external ear normal.      Mouth/Throat:      Mouth: Mucous membranes are dry.   Eyes:      Extraocular Movements: Extraocular movements intact.      Conjunctiva/sclera: Conjunctivae normal.      Pupils: Pupils are equal, round, and reactive to light.   Cardiovascular:      Rate and Rhythm: Regular rhythm. Tachycardia present.      Heart sounds: No murmur heard.     Comments: Hyperdynamic.  Pulmonary:      Effort: Pulmonary effort is normal.      Breath sounds: Normal breath sounds.   Abdominal:      General: Abdomen is flat.      Comments: Increased bowel sounds, covered wound (no bleeding or discharge) in RUQ, surrounding tenderness with no rebound or guarding.    Musculoskeletal:         General: Normal range of motion.      Cervical back: Normal range of motion and neck supple.   Skin:     General: Skin is dry.      Capillary Refill: Capillary " refill takes less than 2 seconds.   Neurological:      General: No focal deficit present.      Mental Status: She is alert. Mental status is at baseline.     Fluids  Intake/Output Summary (Last 24 hours) at 9/13/2023 1332  Last data filed at 9/13/2023 0500  Gross per 24 hour   Intake 5145.65 ml   Output 0 ml   Net 5145.65 ml     Laboratory    Recent Labs     09/11/23  0439 09/12/23  0328 09/13/23  0252   SODIUM 139 139 141   POTASSIUM 3.0* 3.5* 3.6   CHLORIDE 104 104 107   CO2 23 17* 23   GLUCOSE 89 73 102*   BUN 4* 3* 2*   CREATININE 0.46* 0.47* 0.49*   CALCIUM 8.1* 8.6 8.3*         Recent Labs     09/12/23  0328   TRIGLYCERIDE 101   HDL 42   LDL 99         Imaging  CT-ABDOMEN-PELVIS WITH   Final Result      1.  Stable subcapsular fluid collection anterior to the medial segment of the left hepatic lobe which measures 2.1 x 5.9 cm.   2.  The gallbladder is decompressed and does not demonstrate evidence of significant interval increase in pericholecystic or perihepatic fluid.   3.  Redemonstrated rectosigmoid colonic wall thickening which could be seen in the setting of infectious or inflammatory colitis.   4.  Slightly increased small bilateral pleural effusions, right greater than left with associated compressive atelectasis and/or consolidation. Underlying infection is possible.   5.  Stable right-sided subphrenic fluid.      CT-ABDOMEN-PELVIS W/O   Final Result      1.  New subcapsular fluid collection anterior to the medial segment LEFT lobe liver with small amount of fluid in the RIGHT upper abdomen and dependently in the pelvis.   2.  Mildly distended gallbladder.   3.  Rectosigmoid colonic wall thickening suggesting inflammation.   4.  Increased bowel gas, likely indicating ileus.   5.  No bowel obstruction or perforation.   6.  Small bilateral pleural effusions with associated atelectasis, worse on the RIGHT.      US-RUQ   Final Result         1.  Gallbladder sludge with reported gallbladder wall thickening  but without visualized shadowing stones, compatible with known history of cholecystitis.   2.  Small quantity of perihepatic ascites, could represent bile leak from cholecystostomy tube   3.  Atherosclerosis      IR-DRAIN-CHOLECTYSTOSTOMY    (Results Pending)      Assessment/Plan: Odalys Keenan is a 67 y.o. female with past medical history of Crohn disease, hypertension, hyperlipidemia, alcohol abuse and opiate abuse, admitted 9/9/2023 with abdominal pain, nausea and vomiting in the setting of accidental removal of cholecystostomy tube placed for acalculus cholecystitis on previous admission.    * Chronic cholecystitis  Assessment & Plan  On a previous admission (August 2023), cholecystostomy was placed due to active alcohol withdrawal, C. difficile infection and high suspicion for acalculous cholecystitis.  After discharge, patient accidentally pulled the cholecystostomy tube and was admitted.  Currently she is on antibiotic coverage with Unasyn (day 4 out of 5) and vancomycin for C. difficile prophylaxis.  Today, pain persists but new episodes of diarrhea (every 10 minutes).  White blood cells have decreased from 10.9 yesterday to 5.5 (?), rest of CBC within normal limits. Alk phos 223, elevated but trending down from  yesterday.  .  CT abdomen pelvis showed stable subcapsular fluid medial segment of left hepatic lobe, no active gallbladder pathology, colonic wall thickening and probable bilateral atelectasis with pleural effusions.  Surgical intervention is not likely due to images showing no acute problems, will wait for official recommendation.  Unclear if no active infection, will continue antibiotics for 1 more day to complete 5-day regimen and reassess (taking into consideration new diarrhea).  Will trend LFTs.  - CMP plus GGTP.  - Continue vancomycin for C. difficile prophylaxis (day 3).  - CT with contrast for tomorrow.    Diarrhea- (present on admission)  Assessment & Plan  Was present on  "admission, probably acute on chronic.  Has a past medical history of CAD infection on previous admissions this year.  This admission, moderate diarrhea but C. difficile panel was negative on September 10.  Considering past medical history and the fact that he is currently on Unasyn, patient was put on vancomycin for C. difficile prophylaxis.  Diarrhea resolved until today.  Patient endorses multiple episodes of watery diarrhea, nonbloody, \"every 10 minutes\" according to nursing staff.  Since cholecystitis is apparently progressing favorably, not likely the cause for new onset diarrhea.  We will get a new C. difficile panel, keep monitoring CBCs and LFTs and reassess.  -C. difficile panel.  - CBC and LFTs.  -Monitor for any signs of dehydration.    Alcohol withdrawal delirium (HCC)- (present on admission)  Assessment & Plan  Patient's last drink was in June.  Currently on CIWA and Librium.  Yesterday, CIWA 0.  Today, no tremors, no confusion, physical examination was benign.  Will discontinue CIWA and Librium and monitor for any clinical changes.  - Discontinue CIWA protocol and Librium  - Monitor electrolytes.  - Soft restraints as needed.    VTE prophylaxis: enoxaparin ppx hold    I have performed a physical exam and reviewed and updated ROS and Plan today (9/13/2023). In review of yesterday's note (9/12/2023), there are no changes except as documented above.  Medically Cleared  ROS  "

## 2023-09-13 NOTE — CARE PLAN
The patient is Watcher - Medium risk of patient condition declining or worsening    Shift Goals  Clinical Goals: pain mangement, drain placement, NPO, CIWA  Patient Goals: comfort, rest  Family Goals: alexander      Patient is not progressing towards the following goals:      Problem: Pain - Standard  Goal: Alleviation of pain or a reduction in pain to the patient’s comfort goal  Outcome: Not Met  Note: Patient continues to complain of abdominal pain requiring the use of PO pain medications. Pain medication re assessed per primary team.

## 2023-09-13 NOTE — CARE PLAN
The patient is Stable - Low risk of patient condition declining or worsening    Shift Goals  Clinical Goals: Rest, Pain management, safety     Patient Goals: Rest, advance diet, pain management  Family Goals: KUN    Progress made toward(s) clinical / shift goals:     Problem: Pain - Standard  Goal: Alleviation of pain or a reduction in pain to the patient’s comfort goal  Outcome: Progressing  Patient verbalizes pain improvement after receiving oxycodone.     Problem: Knowledge Deficit - Standard  Goal: Patient and family/care givers will demonstrate understanding of plan of care, disease process/condition, diagnostic tests and medications  Outcome: Progressing  Patient verbalizes understanding of POC.     Problem: Fall Risk  Goal: Patient will remain free from falls  Outcome: Progressing   Patient remains free from falls, bed alarm on, needs to be reminded to use call light and wait for staff to help to bedside commode.

## 2023-09-13 NOTE — PROGRESS NOTES
"    DATE: 9/13/2023    Hospital Day 5 Surgery consults for pulled out cholecystostomy tube.    INTERVAL EVENTS:  WBC 5.5  Tolerating diet with  no N/V    Will review CT abdomen pelvis when completed.   REVIEW OF SYSTEMS:  Review of Systems   Gastrointestinal:  Negative for abdominal pain.   Musculoskeletal: Negative.    Neurological:  Negative for dizziness and headaches.       PHYSICAL EXAMINATION:  Vital Signs: BP (!) 148/74   Pulse 84   Temp 36.2 °C (97.2 °F) (Temporal)   Resp 18   Ht 1.727 m (5' 8\")   Wt 62.2 kg (137 lb 2 oz)   SpO2 96%   Physical Exam  Vitals and nursing note reviewed.   Constitutional:       Appearance: Normal appearance.   HENT:      Head: Atraumatic.   Pulmonary:      Effort: Pulmonary effort is normal.   Abdominal:      Palpations: Abdomen is soft.   Musculoskeletal:         General: Normal range of motion.   Skin:     General: Skin is warm and dry.   Neurological:      Mental Status: She is alert and oriented to person, place, and time.         LABORATORY VALUES:   Recent Labs     09/11/23 0439 09/12/23 0328 09/13/23 0252   WBC 10.1 10.9* 5.5   RBC 2.79* 2.98* 2.74*   HEMOGLOBIN 8.8* 9.2* 8.5*   HEMATOCRIT 27.1* 28.8* 26.3*   MCV 97.1 96.6 96.0   MCH 31.5 30.9 31.0   MCHC 32.5 31.9* 32.3   RDW 48.9 47.5 46.8   PLATELETCT 536* 514* 522*   MPV 10.2 10.6 10.7     Recent Labs     09/11/23 0439 09/12/23 0328 09/13/23 0252   SODIUM 139 139 141   POTASSIUM 3.0* 3.5* 3.6   CHLORIDE 104 104 107   CO2 23 17* 23   GLUCOSE 89 73 102*   BUN 4* 3* 2*   CREATININE 0.46* 0.47* 0.49*   CALCIUM 8.1* 8.6 8.3*     Recent Labs     09/11/23 0439 09/12/23 0328 09/13/23 0252   ASTSGOT 9* 12 11*   ALTSGPT 10 7 8   TBILIRUBIN 0.4 0.3 0.2   ALKPHOSPHAT 292* 266* 223*   GLOBULIN 2.9 3.0 2.5            IMAGING:   CT-ABDOMEN-PELVIS W/O   Final Result      1.  New subcapsular fluid collection anterior to the medial segment LEFT lobe liver with small amount of fluid in the RIGHT upper abdomen and " dependently in the pelvis.   2.  Mildly distended gallbladder.   3.  Rectosigmoid colonic wall thickening suggesting inflammation.   4.  Increased bowel gas, likely indicating ileus.   5.  No bowel obstruction or perforation.   6.  Small bilateral pleural effusions with associated atelectasis, worse on the RIGHT.      US-RUQ   Final Result         1.  Gallbladder sludge with reported gallbladder wall thickening but without visualized shadowing stones, compatible with known history of cholecystitis.   2.  Small quantity of perihepatic ascites, could represent bile leak from cholecystostomy tube   3.  Atherosclerosis      IR-DRAIN-CHOLECTYSTOSTOMY    (Results Pending)   CT-ABDOMEN-PELVIS WITH    (Results Pending)       Labs reviewed                      ASSESSMENT AND PLAN:   * Chronic cholecystitis  Assessment & Plan  Cholecystostomy tube placed 8/25/23.  Removed during this hospitalization while the patient had altered mental status.  Currently tolerating a vegetarian diet without nausea or vomiting.  Subcapsular fluid collection on imaging makes it difficult to determine if her right-upper quadrant tenderness is due to biliary colic or cholecystitis.  Will review CT abdomen pelvis today, if remains without clinical symptoms or significant lab abnormalities may defer further treatment for cholecystitis.        Discussed patient condition with RN, Patient, and general surgery Dr. Funk.

## 2023-09-13 NOTE — ASSESSMENT & PLAN NOTE
Cholecystostomy tube placed 8/25/23.  Removed during this hospitalization while the patient had altered mental status.  Currently tolerating a vegetarian diet without nausea or vomiting.  Subcapsular fluid collection on imaging makes it difficult to determine if her right-upper quadrant tenderness is due to biliary colic or cholecystitis.  Will review CT abdomen pelvis today, if remains without clinical symptoms or significant lab abnormalities may defer further treatment for cholecystitis.

## 2023-09-13 NOTE — CARE PLAN
The patient is Stable - Low risk of patient condition declining or worsening    Shift Goals  Clinical Goals: Rest, Pain managemen, CIWA  Patient Goals: Rest, advance diet, pain management  Family Goals: KUN    Progress made toward(s) clinical / shift goals:    Problem: Pain - Standard  Goal: Alleviation of pain or a reduction in pain to the patient’s comfort goal  Outcome: Progressing  Note: Patient medicated per MAR and able to rest comfortably throughout shift.      Problem: Optimal Care for Alcohol Withdrawal  Goal: Optimal Care for the alcohol withdrawal patient  Outcome: Progressing  Note: Patient assessed per [facility] protocol. Patient displaying no signs of active withdraw at this time.      Problem: Fall Risk  Goal: Patient will remain free from falls  Outcome: Progressing  Note: Patient has remained free of falls this shift. Instructed patient to use call light for help. Call light always placed within reach when leaving room. Bed alarm is in place and double checked for proper function.

## 2023-09-13 NOTE — PROGRESS NOTES
[facility] Internal Medicine Daily Progress Note    Date of Service  9/12/2023    [facility] Team: R IM Blue Team   Attending: Bettie Rojas M.d.  Senior Resident: Dr. Trae Dickson  Intern: Dr. Armando Reyes Yparraguirre  Contact Number: [phone]    Chief Complaint  Odalys Keenan is a 67 y.o. female admitted 9/9/2023 with abdominal pain.    Hospital Course  Odalys Keenan is a 67 y.o. female with past medical history of Crohn disease, hypertension, hyperlipidemia, alcohol abuse, and opiate abuse, who presented 9/9/2023 complaining of right upper quadrant pain along with nausea and vomiting for the last 3 weeks. Patient was found to have leukocytosis of 13,000, ultrasound showed gallbladder sludge with gallbladder wall thickening with no stones small quantity of perihepatic ascites could be bile leak from cholecystostomy tube.     Patient had a recent patient in 8/25 for acute cholecystitis and pancolitis-positive for C. difficile A&B toxin.  CT and HIDA scan were consistent with chronic cholecystitis, treated with IV Zosyn, oral vancomycin, IV Flagyl.  Patient underwent CT-guided gallbladder drainage 80/27 by IR.  Per ID she was transition to IV cefepime plus Flagyl end date of 9/2/2023.  Gallbladder drainage culture positive for lactobacillus.  Gastroenterology was also consulted and recommended colonoscopy as outpatient when infection has resolved, and consideration for treatment of Crohn's disease then.  They are had recommended to drain to remain in place for 6 weeks.     Pt admitted also for alcohol withdrawal syndrome, patient started on CIWA protocol, have started patient on IV ceftriaxone and Flagyl. Surgery was consulted, not a candidate for surgical management and recommended cholecystostomy tube replaced while her alcohol withdrawal is being treated.     Interval Problem Update  Patient complains of moderate abdominal pain, worst in the left side without nausea/vomiting and bloody diarrhea.  Denies  fever, chills.    I have discussed this patient's plan of care and discharge plan at IDT rounds today with Case Management, Nursing, Nursing leadership, and other members of the IDT team.    Consultants/Specialty: None    Code Status  Full Code    Disposition  Not medically clear.   Dispo TBD.   I have placed the appropriate orders for post-discharge needs.    Physical Exam  Temp:  [36.2 °C (97.1 °F)-37.2 °C (98.9 °F)] 37 °C (98.6 °F)  Pulse:  [] 103  Resp:  [16-19] 18  BP: (124-144)/(81-89) 124/86  SpO2:  [91 %-97 %] 96 %    Physical Exam  Constitutional:       Appearance: She is ill-appearing.   HENT:      Head: Normocephalic and atraumatic.      Right Ear: Tympanic membrane, ear canal and external ear normal.      Left Ear: Tympanic membrane, ear canal and external ear normal.      Mouth/Throat:      Mouth: Mucous membranes are dry.   Eyes:      Extraocular Movements: Extraocular movements intact.      Conjunctiva/sclera: Conjunctivae normal.      Pupils: Pupils are equal, round, and reactive to light.   Cardiovascular:      Rate and Rhythm: Regular rhythm. Tachycardia present.      Heart sounds: No murmur heard.     Comments: Hyperdynamic.  Pulmonary:      Effort: Pulmonary effort is normal.      Breath sounds: Normal breath sounds.   Abdominal:      General: Abdomen is flat.      Comments: Increased bowel sounds, covered wound (no bleeding or discharge) in RUQ, surrounding tenderness with no rebound or guarding.    Musculoskeletal:         General: Normal range of motion.      Cervical back: Normal range of motion and neck supple.   Skin:     General: Skin is dry.      Capillary Refill: Capillary refill takes less than 2 seconds.   Neurological:      General: No focal deficit present.      Mental Status: She is alert. Mental status is at baseline.     Fluids    Intake/Output Summary (Last 24 hours) at 9/12/2023 1733  Last data filed at 9/12/2023 1241  Gross per 24 hour   Intake 50 ml   Output 1950 ml    Net -1900 ml     Laboratory  Recent Labs     09/10/23  0348 09/11/23 0439 09/12/23  0328   WBC 9.5 10.1 10.9*   RBC 2.95* 2.79* 2.98*   HEMOGLOBIN 9.0* 8.8* 9.2*   HEMATOCRIT 29.0* 27.1* 28.8*   MCV 98.3* 97.1 96.6   MCH 30.5 31.5 30.9   MCHC 31.0* 32.5 31.9*   RDW 49.8 48.9 47.5   PLATELETCT 607* 536* 514*   MPV 10.7 10.2 10.6       Recent Labs     09/10/23  0348 09/11/23  0439 09/12/23  0328   SODIUM 138 139 139   POTASSIUM 3.9 3.0* 3.5*   CHLORIDE 106 104 104   CO2 22 23 17*   GLUCOSE 108* 89 73   BUN 7* 4* 3*   CREATININE 0.50 0.46* 0.47*   CALCIUM 8.8 8.1* 8.6         Recent Labs     09/12/23 0328   TRIGLYCERIDE 101   HDL 42   LDL 99       Imaging  CT-ABDOMEN-PELVIS W/O   Final Result      1.  New subcapsular fluid collection anterior to the medial segment LEFT lobe liver with small amount of fluid in the RIGHT upper abdomen and dependently in the pelvis.   2.  Mildly distended gallbladder.   3.  Rectosigmoid colonic wall thickening suggesting inflammation.   4.  Increased bowel gas, likely indicating ileus.   5.  No bowel obstruction or perforation.   6.  Small bilateral pleural effusions with associated atelectasis, worse on the RIGHT.      US-RUQ   Final Result         1.  Gallbladder sludge with reported gallbladder wall thickening but without visualized shadowing stones, compatible with known history of cholecystitis.   2.  Small quantity of perihepatic ascites, could represent bile leak from cholecystostomy tube   3.  Atherosclerosis      IR-DRAIN-CHOLECTYSTOSTOMY    (Results Pending)      Assessment/Plan: Odalys Keenan is a 67 y.o. female admitted 9/9/2023 with abdominal pain.    * Chronic cholecystitis  Assessment & Plan  In a previous admission (August 2023), cholecystostomy was placed due to active alcohol withdrawal, C. difficile infection and high suspicion for acalculous cholecystitis.  After discharge, patient accidentally pulled the cholecystostomy tube and was admitted.  Currently she  is on antibiotic coverage with Unasyn (day 3 out of 5) and vancomycin for C. difficile prophylaxis.  Today, patient complains of increasing abdominal pain localized mainly in the left side without any other symptoms.  CBC was within normal limits, CMP was positive for hypokalemia and alkaline phosphatase still high 266 (lower than yesterday 292). CT without contrast showed mildly distended gallbladder.  IR was consulted and they recommended a new CT with contrast for tomorrow.  Surgery was also consulted to explore the possibility of cholecystectomy instead of cholecystostomy.  Will wait for new CT to reassess.  For the time being, we will continue with antibiotic coverage.  - Continue Unasyn (day 3 out of 5).  - Continue vancomycin for C. difficile prophylaxis (day 2).  - CT with contrast for tomorrow.  - Reassess options after CT.    Alcohol withdrawal delirium (HCC)- (present on admission)  Assessment & Plan  Patient's last drink was in June.  Currently on CIWA and Librium.  On previous days, CIWA has been between 12 and 15 and patient has been receiving lorazepam.  Interview and examination during those days was difficult due to patient being sleepy and uncooperative.  Today, CIWA was 4 and patient did not receive any benzodiazepines.  Denies any tremors, headaches, nausea/vomiting.  Will taper Librium from every 8 hours to every 12h.  In case is well-tolerated will do it daily.  - Continue CIWA protocol.  - Change frequency of Librium to every 12 hours.  - Monitor electrolytes.  - Soft restraints as needed.    VTE prophylaxis: enoxaparin ppx hold for procedure    I have performed a physical exam and reviewed and updated ROS and Plan today (9/12/2023). In review of yesterday's note (9/11/2023), there are no changes except as documented above.

## 2023-09-13 NOTE — PROGRESS NOTES
"  DATE: 9/12/2023    Hospital Day 4  alcohol withdrawal .    INTERVAL EVENTS:  Mental status improving. Tolerating diet without nausea or vomiting.    REVIEW OF SYSTEMS:  Comprehensive review of systems is negative with the exception of the aforementioned HPI, PMH, and PSH bullets in accordance with CMS guidelines.    PHYSICAL EXAMINATION:  Vital Signs: /73   Pulse (!) 114   Temp 36.8 °C (98.2 °F) (Temporal)   Resp 18   Ht 1.727 m (5' 8\")   Wt 56.9 kg (125 lb 7.1 oz)   SpO2 92%   Physical Exam  Vitals and nursing note reviewed.   Constitutional:       General: She is not in acute distress.     Appearance: She is not toxic-appearing.   HENT:      Head: Normocephalic and atraumatic.      Right Ear: External ear normal.      Left Ear: External ear normal.      Nose: Nose normal.      Mouth/Throat:      Mouth: Mucous membranes are moist.      Pharynx: Oropharynx is clear.   Eyes:      General: No scleral icterus.     Pupils: Pupils are equal, round, and reactive to light.   Cardiovascular:      Rate and Rhythm: Regular rhythm. Tachycardia present.   Pulmonary:      Effort: Pulmonary effort is normal. No respiratory distress.   Abdominal:      General: There is no distension.      Palpations: Abdomen is soft.      Tenderness: There is abdominal tenderness in the right upper quadrant. There is no guarding or rebound.   Genitourinary:     Comments: Deferred.  Musculoskeletal:         General: No deformity. Normal range of motion.      Cervical back: Normal range of motion and neck supple.   Skin:     General: Skin is warm and dry.      Capillary Refill: Capillary refill takes less than 2 seconds.   Neurological:      General: No focal deficit present.      Mental Status: She is alert.         LABORATORY VALUES:  Recent Labs     09/10/23  0348 09/11/23  0439 09/12/23  0328   WBC 9.5 10.1 10.9*   RBC 2.95* 2.79* 2.98*   HEMOGLOBIN 9.0* 8.8* 9.2*   HEMATOCRIT 29.0* 27.1* 28.8*   MCV 98.3* 97.1 96.6   MCH 30.5 31.5 " 30.9   MCHC 31.0* 32.5 31.9*   RDW 49.8 48.9 47.5   PLATELETCT 607* 536* 514*   MPV 10.7 10.2 10.6     Recent Labs     09/10/23  0348 09/11/23 0439 09/12/23  0328   SODIUM 138 139 139   POTASSIUM 3.9 3.0* 3.5*   CHLORIDE 106 104 104   CO2 22 23 17*   GLUCOSE 108* 89 73   BUN 7* 4* 3*   CREATININE 0.50 0.46* 0.47*   CALCIUM 8.8 8.1* 8.6     Recent Labs     09/10/23  0348 09/11/23  0439 09/12/23  0328   ASTSGOT 16 9* 12   ALTSGPT 16 10 7   TBILIRUBIN 0.5 0.4 0.3   ALKPHOSPHAT 338* 292* 266*   GLOBULIN 3.1 2.9 3.0           IMAGING:  CT-ABDOMEN-PELVIS W/O   Final Result      1.  New subcapsular fluid collection anterior to the medial segment LEFT lobe liver with small amount of fluid in the RIGHT upper abdomen and dependently in the pelvis.   2.  Mildly distended gallbladder.   3.  Rectosigmoid colonic wall thickening suggesting inflammation.   4.  Increased bowel gas, likely indicating ileus.   5.  No bowel obstruction or perforation.   6.  Small bilateral pleural effusions with associated atelectasis, worse on the RIGHT.      US-RUQ   Final Result         1.  Gallbladder sludge with reported gallbladder wall thickening but without visualized shadowing stones, compatible with known history of cholecystitis.   2.  Small quantity of perihepatic ascites, could represent bile leak from cholecystostomy tube   3.  Atherosclerosis      IR-DRAIN-CHOLECTYSTOSTOMY    (Results Pending)   CT-ABDOMEN-PELVIS WITH    (Results Pending)       ASSESSMENT AND PLAN:    * Chronic cholecystitis  Assessment & Plan  Cholecystostomy tube placed 8/25/23.  Removed during this hospitalization while the patient had altered mental status.  Currently tolerating a vegetarian diet without nausea or vomiting.  Subcapsular fluid collection on imaging makes it difficult to determine if her right-upper quadrant tenderness is due to biliary colic or cholecystitis.  Labs with mild leukocytosis to 10.  Will review CT abdomen pelvis tomorrow, if remains  without clinical symptoms or significant lab abnormalities may defer further treatment for cholecystitis.         ____________________________________     Cristian Funk M.D.    DD: 9/12/2023  9:30 PM

## 2023-09-14 ENCOUNTER — PATIENT OUTREACH (OUTPATIENT)
Dept: SCHEDULING | Facility: IMAGING CENTER | Age: 68
End: 2023-09-14
Payer: MEDICARE

## 2023-09-14 ENCOUNTER — PHARMACY VISIT (OUTPATIENT)
Dept: PHARMACY | Facility: MEDICAL CENTER | Age: 68
End: 2023-09-14
Payer: COMMERCIAL

## 2023-09-14 VITALS
SYSTOLIC BLOOD PRESSURE: 131 MMHG | OXYGEN SATURATION: 97 % | RESPIRATION RATE: 17 BRPM | HEIGHT: 68 IN | HEART RATE: 110 BPM | WEIGHT: 138.01 LBS | BODY MASS INDEX: 20.92 KG/M2 | TEMPERATURE: 97 F | DIASTOLIC BLOOD PRESSURE: 88 MMHG

## 2023-09-14 LAB
ALBUMIN SERPL BCP-MCNC: 2.8 G/DL (ref 3.2–4.9)
ALBUMIN/GLOB SERPL: 1 G/DL
ALP SERPL-CCNC: 235 U/L (ref 30–99)
ALT SERPL-CCNC: 12 U/L (ref 2–50)
ANION GAP SERPL CALC-SCNC: 9 MMOL/L (ref 7–16)
AST SERPL-CCNC: 18 U/L (ref 12–45)
BILIRUB SERPL-MCNC: 0.2 MG/DL (ref 0.1–1.5)
BUN SERPL-MCNC: 2 MG/DL (ref 8–22)
CALCIUM ALBUM COR SERPL-MCNC: 9.5 MG/DL (ref 8.5–10.5)
CALCIUM SERPL-MCNC: 8.5 MG/DL (ref 8.5–10.5)
CHLORIDE SERPL-SCNC: 107 MMOL/L (ref 96–112)
CO2 SERPL-SCNC: 24 MMOL/L (ref 20–33)
CREAT SERPL-MCNC: 0.55 MG/DL (ref 0.5–1.4)
GFR SERPLBLD CREATININE-BSD FMLA CKD-EPI: 101 ML/MIN/1.73 M 2
GGT SERPL-CCNC: 107 U/L (ref 7–34)
GLOBULIN SER CALC-MCNC: 2.8 G/DL (ref 1.9–3.5)
GLUCOSE SERPL-MCNC: 127 MG/DL (ref 65–99)
POTASSIUM SERPL-SCNC: 4.1 MMOL/L (ref 3.6–5.5)
PROT SERPL-MCNC: 5.6 G/DL (ref 6–8.2)
SODIUM SERPL-SCNC: 140 MMOL/L (ref 135–145)

## 2023-09-14 PROCEDURE — 700102 HCHG RX REV CODE 250 W/ 637 OVERRIDE(OP): Performed by: STUDENT IN AN ORGANIZED HEALTH CARE EDUCATION/TRAINING PROGRAM

## 2023-09-14 PROCEDURE — 82977 ASSAY OF GGT: CPT

## 2023-09-14 PROCEDURE — 700101 HCHG RX REV CODE 250: Performed by: STUDENT IN AN ORGANIZED HEALTH CARE EDUCATION/TRAINING PROGRAM

## 2023-09-14 PROCEDURE — 99239 HOSP IP/OBS DSCHRG MGMT >30: CPT | Mod: GC | Performed by: INTERNAL MEDICINE

## 2023-09-14 PROCEDURE — A9270 NON-COVERED ITEM OR SERVICE: HCPCS | Mod: JZ

## 2023-09-14 PROCEDURE — RXMED WILLOW AMBULATORY MEDICATION CHARGE: Performed by: STUDENT IN AN ORGANIZED HEALTH CARE EDUCATION/TRAINING PROGRAM

## 2023-09-14 PROCEDURE — A9270 NON-COVERED ITEM OR SERVICE: HCPCS | Performed by: HOSPITALIST

## 2023-09-14 PROCEDURE — 80053 COMPREHEN METABOLIC PANEL: CPT

## 2023-09-14 PROCEDURE — A9270 NON-COVERED ITEM OR SERVICE: HCPCS | Performed by: STUDENT IN AN ORGANIZED HEALTH CARE EDUCATION/TRAINING PROGRAM

## 2023-09-14 PROCEDURE — 700111 HCHG RX REV CODE 636 W/ 250 OVERRIDE (IP): Performed by: STUDENT IN AN ORGANIZED HEALTH CARE EDUCATION/TRAINING PROGRAM

## 2023-09-14 PROCEDURE — 700102 HCHG RX REV CODE 250 W/ 637 OVERRIDE(OP): Performed by: HOSPITALIST

## 2023-09-14 PROCEDURE — 700102 HCHG RX REV CODE 250 W/ 637 OVERRIDE(OP): Mod: JZ

## 2023-09-14 RX ORDER — LEVETIRACETAM 500 MG/1
500 TABLET ORAL 2 TIMES DAILY
Qty: 60 TABLET | Refills: 0 | Status: SHIPPED | OUTPATIENT
Start: 2023-09-14

## 2023-09-14 RX ORDER — HYDROMORPHONE HYDROCHLORIDE 2 MG/1
2-4 TABLET ORAL
Qty: 20 TABLET | Refills: 0 | Status: ON HOLD | OUTPATIENT
Start: 2023-09-14 | End: 2023-09-17

## 2023-09-14 RX ADMIN — OMEPRAZOLE 40 MG: 20 CAPSULE, DELAYED RELEASE ORAL at 04:10

## 2023-09-14 RX ADMIN — GABAPENTIN 100 MG: 100 CAPSULE ORAL at 04:10

## 2023-09-14 RX ADMIN — AMOXICILLIN AND CLAVULANATE POTASSIUM 1 TABLET: 875; 125 TABLET, FILM COATED ORAL at 04:09

## 2023-09-14 RX ADMIN — OXYCODONE HYDROCHLORIDE 10 MG: 10 TABLET ORAL at 00:09

## 2023-09-14 RX ADMIN — POTASSIUM CHLORIDE 40 MEQ: 1500 TABLET, EXTENDED RELEASE ORAL at 04:10

## 2023-09-14 RX ADMIN — VANCOMYCIN HYDROCHLORIDE 125 MG: 5 INJECTION, POWDER, LYOPHILIZED, FOR SOLUTION INTRAVENOUS at 04:10

## 2023-09-14 RX ADMIN — LEVETIRACETAM 500 MG: 500 TABLET, FILM COATED ORAL at 04:10

## 2023-09-14 RX ADMIN — METOPROLOL SUCCINATE 25 MG: 25 TABLET, EXTENDED RELEASE ORAL at 04:09

## 2023-09-14 RX ADMIN — OXYCODONE HYDROCHLORIDE 10 MG: 10 TABLET ORAL at 04:09

## 2023-09-14 RX ADMIN — OXYCODONE HYDROCHLORIDE 10 MG: 10 TABLET ORAL at 10:03

## 2023-09-14 ASSESSMENT — LIFESTYLE VARIABLES
NAUSEA AND VOMITING: NO NAUSEA AND NO VOMITING
VISUAL DISTURBANCES: NOT PRESENT
ANXIETY: NO ANXIETY (AT EASE)
PAROXYSMAL SWEATS: NO SWEAT VISIBLE
AUDITORY DISTURBANCES: NOT PRESENT
PAROXYSMAL SWEATS: NO SWEAT VISIBLE
AGITATION: NORMAL ACTIVITY
ANXIETY: NO ANXIETY (AT EASE)
TOTAL SCORE: 3
TREMOR: *
NAUSEA AND VOMITING: NO NAUSEA AND NO VOMITING
AGITATION: NORMAL ACTIVITY
ORIENTATION AND CLOUDING OF SENSORIUM: ORIENTED AND CAN DO SERIAL ADDITIONS
HEADACHE, FULLNESS IN HEAD: NOT PRESENT
HEADACHE, FULLNESS IN HEAD: NOT PRESENT
TREMOR: *
ORIENTATION AND CLOUDING OF SENSORIUM: ORIENTED AND CAN DO SERIAL ADDITIONS
AUDITORY DISTURBANCES: NOT PRESENT
TOTAL SCORE: 3
VISUAL DISTURBANCES: NOT PRESENT

## 2023-09-14 ASSESSMENT — PAIN DESCRIPTION - PAIN TYPE
TYPE: ACUTE PAIN

## 2023-09-14 ASSESSMENT — FIBROSIS 4 INDEX: FIB4 SCORE: 0.66

## 2023-09-14 NOTE — DISCHARGE PLANNING
Case Management Discharge Planning    Admission Date: 9/9/2023  GMLOS: 5  ALOS: 4    6-Clicks ADL Score: 17  6-Clicks Mobility Score: 21    Anticipated Discharge Dispo: Discharge Disposition: Discharged to home/self care (01)    DME Needed: No    Action(s) Taken: Updated Provider/Nurse on Discharge Plan    Escalations Completed: None    Medically Clear: Later today    **1045  Patient discussed during IDT rounds. Patient is medically cleared and will be discharged later today. No case management needs.    **1145  Taxi voucher provided. Patient reports her vehicle is at [place].

## 2023-09-14 NOTE — PROGRESS NOTES
Patient being discharged in d/c lounge. Pt educated on discharge instructions and new prescriptions, verbalized understanding. Follow up appointment made with PCP. PIV removed. Patient going home via taxi voucher.

## 2023-09-14 NOTE — CARE PLAN
The patient is Stable - Low risk of patient condition declining or worsening    Shift Goals  Clinical Goals: CDIFF rule out, pain management  Patient Goals: Rest, pain control  Family Goals: KUN    Progress made toward(s) clinical / shift goals:    Problem: Pain - Standard  Goal: Alleviation of pain or a reduction in pain to the patient’s comfort goal  Outcome: Progressing  Note: Patient medicated with PRN pain medications, encouraged to utilize nonpharmacologic means of pain relief.      Problem: Optimal Care for Alcohol Withdrawal  Goal: Optimal Care for the alcohol withdrawal patient  Outcome: Progressing  Note: Patient assessed per CIWA protocol. CIWA scores trending down, no ativan required during course of shift.      Problem: Psychosocial  Goal: Patient's level of anxiety will decrease  Outcome: Progressing  Flowsheets (Taken 9/14/2023 0446)  Decrease Anxiety Level:   Collaborated with patient to identify and develop coping strategies   Implemented stimuli reduction, calming techniques  Note: No signs of anxiety or agitation, patient able to sleep comfortably throughout shift.

## 2023-09-14 NOTE — DISCHARGE SUMMARY
[facility] Internal Medicine Discharge Summary    Attending: Bettie Rojas M.d.  Senior Resident: Dr. Dickson  Intern:  Dr. Reyes  Contact Number: [phone]    CHIEF COMPLAINT ON ADMISSION  Chief Complaint   Patient presents with    Abdominal Pain       Reason for Admission  ems     Admission Date  9/9/2023    CODE STATUS  Full Code    HPI & HOSPITAL COURSE    Ms. Keenan is a 67-year-old female with past medical history of hypertension, hyperlipidemia, Crohn's disease, substance dependency (alcohol, opiates) who presented to facility on September 9, 2023 in the setting of right upper quadrant pain and ongoing nausea vomiting for 3 weeks.  Of note, patient was recently admitted to our facility on August 25, 2023 for diffuse colitis incidental gallbladder during which time cholecystostomy drain was placed.  Patient was discharged and later presented to our facility.  Shortly after admission, patient became agitated and pulled out her cholecystostomy tube.  Surgery was consulted and initially recommended patient undergo reinsertion of cholecystostomy tube due to active alcohol withdrawal.  Throughout this mission, patient was monitored and alcohol withdrawal was managed.  Repeat CT scans as per recommendation of IR demonstrated subcapsular fluid collection and decompressed gallbladder.  Given evidence of acute cholecystitis, patient was ultimately recommended to undergo elective cholecystectomy with no need for replacement of cholecystostomy tube.    Also of note, during this admission patient began experiencing several episodes of watery diarrhea while on IV antibiotics.  Given patient's history of C. difficile infection last month, patient was empirically treated for potential C. difficile reinfection while on antibiotics, toxin however would later come back negative.       Therefore, she is discharged in good and stable condition to home with close outpatient follow-up.    The patient met 2-midnight criteria for an  inpatient stay at the time of discharge.    Discharge Date  9/14/23    Physical Exam on Day of Discharge    Physical Exam  Constitutional:       Appearance: She is ill-appearing.   HENT:      Head: Normocephalic and atraumatic.      Right Ear: Tympanic membrane, ear canal and external ear normal.      Left Ear: Tympanic membrane, ear canal and external ear normal.      Mouth/Throat:      Mouth: Mucous membranes are moist  Eyes:      Extraocular Movements: Extraocular movements intact.      Conjunctiva/sclera: Conjunctivae normal.      Pupils: Pupils are equal, round, and reactive to light.   Cardiovascular:      Rate and Rhythm: Regular rhythm. Tachycardia present.      Heart sounds: No murmur heard.  Pulmonary:      Effort: Pulmonary effort is normal.      Breath sounds: Normal breath sounds.   Abdominal:      General: Abdomen is flat.      Comments: Increased bowel sounds, covered wound (no bleeding or discharge) in RUQ, surrounding mild tenderness with no rebound or guarding.    Musculoskeletal:         General: Normal range of motion.      Cervical back: Normal range of motion and neck supple.   Skin:     General: Skin is dry.      Capillary Refill: Capillary refill takes less than 2 seconds.   Neurological:      General: No focal deficit present.      Mental Status: She is alert. Mental status is at baseline.     FOLLOW UP ITEMS POST DISCHARGE  F/u with surgery for elective cholecystectomy    DISCHARGE DIAGNOSES  Principal Problem:    Chronic cholecystitis (POA: Unknown)  Active Problems:    Elevated liver enzymes (POA: Yes)    Anxiety and depression (POA: Yes)    Seizures (HCC) (POA: Yes)    Dyslipidemia (POA: Yes)    Alcohol withdrawal delirium (HCC) (POA: Yes)    Hyperglycemia (POA: Yes)    Crohn's disease (HCC) (POA: Yes)    Paroxysmal atrial fibrillation (HCC) (POA: Yes)    Hypomagnesemia (POA: Yes)    Anemia (POA: Unknown)    Diarrhea (POA: Yes)    SIRS (systemic inflammatory response syndrome) (HCC)  (POA: Unknown)  Resolved Problems:    Abdominal pain (POA: Yes)      FOLLOW UP  No future appointments.  [facility]  [address]  [place] [address]  [phone]  Follow up        MEDICATIONS ON DISCHARGE     Medication List        CHANGE how you take these medications        Instructions   HYDROmorphone 2 MG Tabs  What changed:   when to take this  reasons to take this  Commonly known as: Dilaudid   Take 1-2 Tablets by mouth 2 (two) times a day for 5 days.  Dose: 2-4 mg     levETIRAcetam 500 MG Tabs  What changed: when to take this  Commonly known as: Keppra   Take 1 Tablet by mouth 2 times a day.  Dose: 500 mg            CONTINUE taking these medications        Instructions   DULoxetine 60 MG Cpep delayed-release capsule  Commonly known as: Cymbalta   Take 60 mg by mouth every day.  Dose: 60 mg     gabapentin 100 MG Caps  Commonly known as: Neurontin   Take 1 Capsule by mouth 3 times a day.  Dose: 100 mg     Icy Hot extra-strength 10-30 % Crea topical cream   Apply 1 Application topically 4 times a day as needed (to back or abdomen).  Dose: 1 Application     lidocaine 5 % Ptch  Commonly known as: Lidoderm   Place 3 Patches on the skin every 24 hours.  Dose: 3 Patch     melatonin 5 mg Tabs   Take 10 mg by mouth at bedtime as needed (Sleep).  Dose: 10 mg     metoprolol SR 25 MG Tb24  Commonly known as: Toprol XL   Take 1 Tablet by mouth every day.  Dose: 25 mg     mirtazapine 15 MG Tabs  Commonly known as: Remeron   Take 2 Tablets by mouth at bedtime.  Dose: 30 mg     Naloxone 4 MG/0.1ML Liqd  Commonly known as: Narcan   Spray one actuation in one nostril for overdose. Call 911. May repeat in 2 minutes if needed.     omeprazole 40 MG delayed-release capsule  Commonly known as: PriLOSEC   Take 40 mg by mouth every day.  Dose: 40 mg     ondansetron 8 MG Tabs  Commonly known as: Zofran   Take 1 Tablet by mouth every 8 hours as needed for Nausea/Vomiting.  Dose: 8 mg     rosuvastatin 20 MG  Tabs  Commonly known as: Crestor   Take 20 mg by mouth every evening.  Dose: 20 mg              Allergies  Allergies   Allergen Reactions    Nsaids Vomiting       DIET  Orders Placed This Encounter   Procedures    Diet Order Diet: Regular; Miscellaneous modifications: (optional): Vegetarian     Standing Status:   Standing     Number of Occurrences:   1     Order Specific Question:   Diet:     Answer:   Regular [1]     Order Specific Question:   Miscellaneous modifications: (optional)     Answer:   Vegetarian [13]       ACTIVITY  As tolerated.  Weight bearing as tolerated    CONSULTATIONS  General Surgery, IR    PROCEDURES  None    LABORATORY  Lab Results   Component Value Date    SODIUM 141 09/13/2023    POTASSIUM 3.6 09/13/2023    CHLORIDE 107 09/13/2023    CO2 23 09/13/2023    GLUCOSE 102 (H) 09/13/2023    BUN 2 (L) 09/13/2023    CREATININE 0.49 (L) 09/13/2023        Lab Results   Component Value Date    WBC 5.5 09/13/2023    HEMOGLOBIN 8.5 (L) 09/13/2023    HEMATOCRIT 26.3 (L) 09/13/2023    PLATELETCT 522 (H) 09/13/2023        Total time of the discharge process exceeds 37 minutes.

## 2023-09-15 ENCOUNTER — APPOINTMENT (OUTPATIENT)
Dept: RADIOLOGY | Facility: MEDICAL CENTER | Age: 68
End: 2023-09-15
Attending: EMERGENCY MEDICINE
Payer: MEDICARE

## 2023-09-15 ENCOUNTER — HOSPITAL ENCOUNTER (OUTPATIENT)
Facility: MEDICAL CENTER | Age: 68
End: 2023-09-17
Attending: EMERGENCY MEDICINE | Admitting: HOSPITALIST
Payer: MEDICARE

## 2023-09-15 DIAGNOSIS — K50.90 CROHN'S DISEASE WITHOUT COMPLICATION, UNSPECIFIED GASTROINTESTINAL TRACT LOCATION (HCC): ICD-10-CM

## 2023-09-15 DIAGNOSIS — R10.84 GENERALIZED ABDOMINAL PAIN: ICD-10-CM

## 2023-09-15 DIAGNOSIS — E86.0 DEHYDRATION: ICD-10-CM

## 2023-09-15 DIAGNOSIS — R11.2 NAUSEA AND VOMITING, UNSPECIFIED VOMITING TYPE: ICD-10-CM

## 2023-09-15 DIAGNOSIS — R53.1 WEAKNESS: ICD-10-CM

## 2023-09-15 PROBLEM — F19.10 SUBSTANCE ABUSE (HCC): Status: ACTIVE | Noted: 2023-09-15

## 2023-09-15 LAB
ALBUMIN SERPL BCP-MCNC: 3.9 G/DL (ref 3.2–4.9)
ALBUMIN/GLOB SERPL: 1.1 G/DL
ALP SERPL-CCNC: 304 U/L (ref 30–99)
ALT SERPL-CCNC: 13 U/L (ref 2–50)
AMPHET UR QL SCN: NEGATIVE
ANION GAP SERPL CALC-SCNC: 17 MMOL/L (ref 7–16)
APPEARANCE UR: CLEAR
AST SERPL-CCNC: 17 U/L (ref 12–45)
BACTERIA BLD CULT: NORMAL
BACTERIA BLD CULT: NORMAL
BARBITURATES UR QL SCN: NEGATIVE
BASOPHILS # BLD AUTO: 1.8 % (ref 0–1.8)
BASOPHILS # BLD: 0.11 K/UL (ref 0–0.12)
BENZODIAZ UR QL SCN: POSITIVE
BILIRUB SERPL-MCNC: 0.4 MG/DL (ref 0.1–1.5)
BILIRUB UR QL STRIP.AUTO: NEGATIVE
BUN SERPL-MCNC: 2 MG/DL (ref 8–22)
BZE UR QL SCN: NEGATIVE
CALCIUM ALBUM COR SERPL-MCNC: 10.3 MG/DL (ref 8.5–10.5)
CALCIUM SERPL-MCNC: 10.2 MG/DL (ref 8.5–10.5)
CANNABINOIDS UR QL SCN: NEGATIVE
CHLORIDE SERPL-SCNC: 105 MMOL/L (ref 96–112)
CO2 SERPL-SCNC: 22 MMOL/L (ref 20–33)
COLOR UR: YELLOW
CREAT SERPL-MCNC: 0.6 MG/DL (ref 0.5–1.4)
EOSINOPHIL # BLD AUTO: 0.08 K/UL (ref 0–0.51)
EOSINOPHIL NFR BLD: 1.3 % (ref 0–6.9)
ERYTHROCYTE [DISTWIDTH] IN BLOOD BY AUTOMATED COUNT: 48.4 FL (ref 35.9–50)
ERYTHROCYTE [SEDIMENTATION RATE] IN BLOOD BY WESTERGREN METHOD: 78 MM/HOUR (ref 0–25)
ETHANOL BLD-MCNC: <10.1 MG/DL
FENTANYL UR QL: NEGATIVE
FLUAV RNA SPEC QL NAA+PROBE: NEGATIVE
FLUBV RNA SPEC QL NAA+PROBE: NEGATIVE
GFR SERPLBLD CREATININE-BSD FMLA CKD-EPI: 99 ML/MIN/1.73 M 2
GLOBULIN SER CALC-MCNC: 3.4 G/DL (ref 1.9–3.5)
GLUCOSE SERPL-MCNC: 92 MG/DL (ref 65–99)
GLUCOSE UR STRIP.AUTO-MCNC: NEGATIVE MG/DL
HCT VFR BLD AUTO: 34.9 % (ref 37–47)
HGB BLD-MCNC: 11.2 G/DL (ref 12–16)
IMM GRANULOCYTES # BLD AUTO: 0.02 K/UL (ref 0–0.11)
IMM GRANULOCYTES NFR BLD AUTO: 0.3 % (ref 0–0.9)
KETONES UR STRIP.AUTO-MCNC: NEGATIVE MG/DL
LACTATE SERPL-SCNC: 1.1 MMOL/L (ref 0.5–2)
LEUKOCYTE ESTERASE UR QL STRIP.AUTO: NEGATIVE
LIPASE SERPL-CCNC: 40 U/L (ref 11–82)
LYMPHOCYTES # BLD AUTO: 1.43 K/UL (ref 1–4.8)
LYMPHOCYTES NFR BLD: 23.8 % (ref 22–41)
MAGNESIUM SERPL-MCNC: 1.5 MG/DL (ref 1.5–2.5)
MCH RBC QN AUTO: 31.1 PG (ref 27–33)
MCHC RBC AUTO-ENTMCNC: 32.1 G/DL (ref 32.2–35.5)
MCV RBC AUTO: 96.9 FL (ref 81.4–97.8)
METHADONE UR QL SCN: NEGATIVE
MICRO URNS: NORMAL
MONOCYTES # BLD AUTO: 0.45 K/UL (ref 0–0.85)
MONOCYTES NFR BLD AUTO: 7.5 % (ref 0–13.4)
NEUTROPHILS # BLD AUTO: 3.91 K/UL (ref 1.82–7.42)
NEUTROPHILS NFR BLD: 65.3 % (ref 44–72)
NITRITE UR QL STRIP.AUTO: NEGATIVE
NRBC # BLD AUTO: 0 K/UL
NRBC BLD-RTO: 0 /100 WBC (ref 0–0.2)
OPIATES UR QL SCN: POSITIVE
OXYCODONE UR QL SCN: NEGATIVE
PCP UR QL SCN: NEGATIVE
PH UR STRIP.AUTO: 6.5 [PH] (ref 5–8)
PLATELET # BLD AUTO: 671 K/UL (ref 164–446)
PMV BLD AUTO: 10.7 FL (ref 9–12.9)
POTASSIUM SERPL-SCNC: 3.8 MMOL/L (ref 3.6–5.5)
PROPOXYPH UR QL SCN: NEGATIVE
PROT SERPL-MCNC: 7.3 G/DL (ref 6–8.2)
PROT UR QL STRIP: NEGATIVE MG/DL
RBC # BLD AUTO: 3.6 M/UL (ref 4.2–5.4)
RBC UR QL AUTO: NEGATIVE
RSV RNA SPEC QL NAA+PROBE: NEGATIVE
SARS-COV-2 RNA RESP QL NAA+PROBE: DETECTED
SIGNIFICANT IND 70042: NORMAL
SIGNIFICANT IND 70042: NORMAL
SITE SITE: NORMAL
SITE SITE: NORMAL
SODIUM SERPL-SCNC: 144 MMOL/L (ref 135–145)
SOURCE SOURCE: NORMAL
SOURCE SOURCE: NORMAL
SP GR UR STRIP.AUTO: 1.01
SPECIMEN SOURCE: ABNORMAL
UROBILINOGEN UR STRIP.AUTO-MCNC: 0.2 MG/DL
WBC # BLD AUTO: 6 K/UL (ref 4.8–10.8)

## 2023-09-15 PROCEDURE — 96372 THER/PROPH/DIAG INJ SC/IM: CPT | Mod: XU

## 2023-09-15 PROCEDURE — G0378 HOSPITAL OBSERVATION PER HR: HCPCS

## 2023-09-15 PROCEDURE — 83735 ASSAY OF MAGNESIUM: CPT

## 2023-09-15 PROCEDURE — 99285 EMERGENCY DEPT VISIT HI MDM: CPT

## 2023-09-15 PROCEDURE — 80053 COMPREHEN METABOLIC PANEL: CPT

## 2023-09-15 PROCEDURE — 83690 ASSAY OF LIPASE: CPT

## 2023-09-15 PROCEDURE — 99221 1ST HOSP IP/OBS SF/LOW 40: CPT | Performed by: STUDENT IN AN ORGANIZED HEALTH CARE EDUCATION/TRAINING PROGRAM

## 2023-09-15 PROCEDURE — 82077 ASSAY SPEC XCP UR&BREATH IA: CPT

## 2023-09-15 PROCEDURE — 700111 HCHG RX REV CODE 636 W/ 250 OVERRIDE (IP): Performed by: EMERGENCY MEDICINE

## 2023-09-15 PROCEDURE — 700101 HCHG RX REV CODE 250: Performed by: HOSPITALIST

## 2023-09-15 PROCEDURE — 81003 URINALYSIS AUTO W/O SCOPE: CPT | Mod: XU

## 2023-09-15 PROCEDURE — 74018 RADEX ABDOMEN 1 VIEW: CPT

## 2023-09-15 PROCEDURE — 700105 HCHG RX REV CODE 258: Performed by: EMERGENCY MEDICINE

## 2023-09-15 PROCEDURE — 85025 COMPLETE CBC W/AUTO DIFF WBC: CPT

## 2023-09-15 PROCEDURE — 99222 1ST HOSP IP/OBS MODERATE 55: CPT | Performed by: HOSPITALIST

## 2023-09-15 PROCEDURE — 80307 DRUG TEST PRSMV CHEM ANLYZR: CPT

## 2023-09-15 PROCEDURE — C9803 HOPD COVID-19 SPEC COLLECT: HCPCS | Performed by: HOSPITALIST

## 2023-09-15 PROCEDURE — 700105 HCHG RX REV CODE 258: Performed by: HOSPITALIST

## 2023-09-15 PROCEDURE — 85652 RBC SED RATE AUTOMATED: CPT

## 2023-09-15 PROCEDURE — 36415 COLL VENOUS BLD VENIPUNCTURE: CPT

## 2023-09-15 PROCEDURE — 96375 TX/PRO/DX INJ NEW DRUG ADDON: CPT

## 2023-09-15 PROCEDURE — 700111 HCHG RX REV CODE 636 W/ 250 OVERRIDE (IP): Mod: JZ | Performed by: HOSPITALIST

## 2023-09-15 PROCEDURE — 0241U HCHG SARS-COV-2 COVID-19 NFCT DS RESP RNA 4 TRGT MIC: CPT

## 2023-09-15 PROCEDURE — 700102 HCHG RX REV CODE 250 W/ 637 OVERRIDE(OP): Performed by: HOSPITALIST

## 2023-09-15 PROCEDURE — A9270 NON-COVERED ITEM OR SERVICE: HCPCS | Performed by: HOSPITALIST

## 2023-09-15 PROCEDURE — 83605 ASSAY OF LACTIC ACID: CPT

## 2023-09-15 PROCEDURE — 700111 HCHG RX REV CODE 636 W/ 250 OVERRIDE (IP): Performed by: HOSPITALIST

## 2023-09-15 PROCEDURE — 96374 THER/PROPH/DIAG INJ IV PUSH: CPT

## 2023-09-15 RX ORDER — SODIUM CHLORIDE, SODIUM LACTATE, POTASSIUM CHLORIDE, CALCIUM CHLORIDE 600; 310; 30; 20 MG/100ML; MG/100ML; MG/100ML; MG/100ML
INJECTION, SOLUTION INTRAVENOUS CONTINUOUS
Status: DISCONTINUED | OUTPATIENT
Start: 2023-09-15 | End: 2023-09-17 | Stop reason: HOSPADM

## 2023-09-15 RX ORDER — MORPHINE SULFATE 4 MG/ML
4 INJECTION INTRAVENOUS ONCE
Status: COMPLETED | OUTPATIENT
Start: 2023-09-15 | End: 2023-09-15

## 2023-09-15 RX ORDER — NALOXONE HYDROCHLORIDE 0.4 MG/ML
0.2 INJECTION, SOLUTION INTRAMUSCULAR; INTRAVENOUS; SUBCUTANEOUS ONCE
Status: COMPLETED | OUTPATIENT
Start: 2023-09-15 | End: 2023-09-15

## 2023-09-15 RX ORDER — METOPROLOL SUCCINATE 25 MG/1
25 TABLET, EXTENDED RELEASE ORAL DAILY
Status: DISCONTINUED | OUTPATIENT
Start: 2023-09-16 | End: 2023-09-17 | Stop reason: HOSPADM

## 2023-09-15 RX ORDER — BISACODYL 10 MG
10 SUPPOSITORY, RECTAL RECTAL
Status: DISCONTINUED | OUTPATIENT
Start: 2023-09-15 | End: 2023-09-17 | Stop reason: HOSPADM

## 2023-09-15 RX ORDER — OMEPRAZOLE 20 MG/1
40 CAPSULE, DELAYED RELEASE ORAL DAILY
Status: DISCONTINUED | OUTPATIENT
Start: 2023-09-16 | End: 2023-09-17 | Stop reason: HOSPADM

## 2023-09-15 RX ORDER — MENTHOL AND METHYL SALICYLATE 7.6; 29 G/100G; G/100G
1 OINTMENT TOPICAL 4 TIMES DAILY PRN
Status: DISCONTINUED | OUTPATIENT
Start: 2023-09-15 | End: 2023-09-17 | Stop reason: HOSPADM

## 2023-09-15 RX ORDER — ROSUVASTATIN CALCIUM 20 MG/1
20 TABLET, COATED ORAL DAILY
Status: DISCONTINUED | OUTPATIENT
Start: 2023-09-16 | End: 2023-09-17 | Stop reason: HOSPADM

## 2023-09-15 RX ORDER — ONDANSETRON 4 MG/1
4 TABLET, ORALLY DISINTEGRATING ORAL EVERY 4 HOURS PRN
Status: DISCONTINUED | OUTPATIENT
Start: 2023-09-15 | End: 2023-09-17 | Stop reason: HOSPADM

## 2023-09-15 RX ORDER — DULOXETIN HYDROCHLORIDE 60 MG/1
60 CAPSULE, DELAYED RELEASE ORAL DAILY
Status: DISCONTINUED | OUTPATIENT
Start: 2023-09-16 | End: 2023-09-17 | Stop reason: HOSPADM

## 2023-09-15 RX ORDER — AMOXICILLIN 250 MG
2 CAPSULE ORAL 2 TIMES DAILY
Status: DISCONTINUED | OUTPATIENT
Start: 2023-09-15 | End: 2023-09-17 | Stop reason: HOSPADM

## 2023-09-15 RX ORDER — GABAPENTIN 100 MG/1
100 CAPSULE ORAL 3 TIMES DAILY
Status: DISCONTINUED | OUTPATIENT
Start: 2023-09-15 | End: 2023-09-17 | Stop reason: HOSPADM

## 2023-09-15 RX ORDER — SODIUM CHLORIDE, SODIUM LACTATE, POTASSIUM CHLORIDE, CALCIUM CHLORIDE 600; 310; 30; 20 MG/100ML; MG/100ML; MG/100ML; MG/100ML
1000 INJECTION, SOLUTION INTRAVENOUS ONCE
Status: COMPLETED | OUTPATIENT
Start: 2023-09-15 | End: 2023-09-15

## 2023-09-15 RX ORDER — LABETALOL HYDROCHLORIDE 5 MG/ML
10 INJECTION, SOLUTION INTRAVENOUS EVERY 4 HOURS PRN
Status: DISCONTINUED | OUTPATIENT
Start: 2023-09-15 | End: 2023-09-17 | Stop reason: HOSPADM

## 2023-09-15 RX ORDER — ENOXAPARIN SODIUM 100 MG/ML
40 INJECTION SUBCUTANEOUS DAILY
Status: DISCONTINUED | OUTPATIENT
Start: 2023-09-15 | End: 2023-09-17 | Stop reason: HOSPADM

## 2023-09-15 RX ORDER — CHOLECALCIFEROL (VITAMIN D3) 125 MCG
10 CAPSULE ORAL NIGHTLY PRN
Status: DISCONTINUED | OUTPATIENT
Start: 2023-09-15 | End: 2023-09-17 | Stop reason: HOSPADM

## 2023-09-15 RX ORDER — MIRTAZAPINE 30 MG/1
30 TABLET, FILM COATED ORAL
Status: DISCONTINUED | OUTPATIENT
Start: 2023-09-15 | End: 2023-09-17 | Stop reason: HOSPADM

## 2023-09-15 RX ORDER — LIDOCAINE 50 MG/G
3 PATCH TOPICAL EVERY EVENING
Status: DISCONTINUED | OUTPATIENT
Start: 2023-09-15 | End: 2023-09-15

## 2023-09-15 RX ORDER — ONDANSETRON 2 MG/ML
4 INJECTION INTRAMUSCULAR; INTRAVENOUS ONCE
Status: COMPLETED | OUTPATIENT
Start: 2023-09-15 | End: 2023-09-15

## 2023-09-15 RX ORDER — LEVETIRACETAM 500 MG/1
500 TABLET ORAL 2 TIMES DAILY
Status: DISCONTINUED | OUTPATIENT
Start: 2023-09-15 | End: 2023-09-17 | Stop reason: HOSPADM

## 2023-09-15 RX ORDER — ONDANSETRON 2 MG/ML
4 INJECTION INTRAMUSCULAR; INTRAVENOUS EVERY 4 HOURS PRN
Status: DISCONTINUED | OUTPATIENT
Start: 2023-09-15 | End: 2023-09-17 | Stop reason: HOSPADM

## 2023-09-15 RX ORDER — POLYETHYLENE GLYCOL 3350 17 G/17G
1 POWDER, FOR SOLUTION ORAL
Status: DISCONTINUED | OUTPATIENT
Start: 2023-09-15 | End: 2023-09-17 | Stop reason: HOSPADM

## 2023-09-15 RX ADMIN — LIDOCAINE 3 PATCH: 50 PATCH TOPICAL at 18:06

## 2023-09-15 RX ADMIN — ONDANSETRON 4 MG: 2 INJECTION INTRAMUSCULAR; INTRAVENOUS at 13:57

## 2023-09-15 RX ADMIN — ENOXAPARIN SODIUM 40 MG: 100 INJECTION SUBCUTANEOUS at 18:04

## 2023-09-15 RX ADMIN — NALOXONE HYDROCHLORIDE 0.2 MG: 0.4 INJECTION, SOLUTION INTRAMUSCULAR; INTRAVENOUS; SUBCUTANEOUS at 21:47

## 2023-09-15 RX ADMIN — GABAPENTIN 100 MG: 100 CAPSULE ORAL at 18:02

## 2023-09-15 RX ADMIN — MORPHINE SULFATE 4 MG: 4 INJECTION INTRAVENOUS at 13:57

## 2023-09-15 RX ADMIN — SODIUM CHLORIDE, POTASSIUM CHLORIDE, SODIUM LACTATE AND CALCIUM CHLORIDE: 600; 310; 30; 20 INJECTION, SOLUTION INTRAVENOUS at 16:15

## 2023-09-15 RX ADMIN — LEVETIRACETAM 500 MG: 500 TABLET, FILM COATED ORAL at 18:03

## 2023-09-15 RX ADMIN — SODIUM CHLORIDE, POTASSIUM CHLORIDE, SODIUM LACTATE AND CALCIUM CHLORIDE 1000 ML: 600; 310; 30; 20 INJECTION, SOLUTION INTRAVENOUS at 13:57

## 2023-09-15 ASSESSMENT — LIFESTYLE VARIABLES
TOTAL SCORE: 0
TOTAL SCORE: 0
EVER HAD A DRINK FIRST THING IN THE MORNING TO STEADY YOUR NERVES TO GET RID OF A HANGOVER: NO
HAVE PEOPLE ANNOYED YOU BY CRITICIZING YOUR DRINKING: NO
ALCOHOL_USE: YES
HOW MANY TIMES IN THE PAST YEAR HAVE YOU HAD 5 OR MORE DRINKS IN A DAY: 0
TOTAL SCORE: 0
EVER FELT BAD OR GUILTY ABOUT YOUR DRINKING: NO
AVERAGE NUMBER OF DAYS PER WEEK YOU HAVE A DRINK CONTAINING ALCOHOL: 0
HAVE YOU EVER FELT YOU SHOULD CUT DOWN ON YOUR DRINKING: NO
ON A TYPICAL DAY WHEN YOU DRINK ALCOHOL HOW MANY DRINKS DO YOU HAVE: 0
CONSUMPTION TOTAL: NEGATIVE
DOES PATIENT WANT TO STOP DRINKING: NO

## 2023-09-15 ASSESSMENT — PATIENT HEALTH QUESTIONNAIRE - PHQ9
5. POOR APPETITE OR OVEREATING: NOT AT ALL
3. TROUBLE FALLING OR STAYING ASLEEP OR SLEEPING TOO MUCH: NOT AT ALL
SUM OF ALL RESPONSES TO PHQ9 QUESTIONS 1 AND 2: 2
1. LITTLE INTEREST OR PLEASURE IN DOING THINGS: SEVERAL DAYS
2. FEELING DOWN, DEPRESSED, IRRITABLE, OR HOPELESS: SEVERAL DAYS
2. FEELING DOWN, DEPRESSED, IRRITABLE, OR HOPELESS: NOT AT ALL
7. TROUBLE CONCENTRATING ON THINGS, SUCH AS READING THE NEWSPAPER OR WATCHING TELEVISION: NOT AT ALL
8. MOVING OR SPEAKING SO SLOWLY THAT OTHER PEOPLE COULD HAVE NOTICED. OR THE OPPOSITE, BEING SO FIGETY OR RESTLESS THAT YOU HAVE BEEN MOVING AROUND A LOT MORE THAN USUAL: NOT AT ALL
6. FEELING BAD ABOUT YOURSELF - OR THAT YOU ARE A FAILURE OR HAVE LET YOURSELF OR YOUR FAMILY DOWN: NOT AL ALL
1. LITTLE INTEREST OR PLEASURE IN DOING THINGS: NOT AT ALL
SUM OF ALL RESPONSES TO PHQ QUESTIONS 1-9: 0
9. THOUGHTS THAT YOU WOULD BE BETTER OFF DEAD, OR OF HURTING YOURSELF: NOT AT ALL
4. FEELING TIRED OR HAVING LITTLE ENERGY: NOT AT ALL
SUM OF ALL RESPONSES TO PHQ9 QUESTIONS 1 AND 2: 0

## 2023-09-15 ASSESSMENT — PAIN DESCRIPTION - PAIN TYPE
TYPE: ACUTE PAIN
TYPE: ACUTE PAIN

## 2023-09-15 ASSESSMENT — ENCOUNTER SYMPTOMS
SHORTNESS OF BREATH: 0
NAUSEA: 1
ABDOMINAL PAIN: 1
DIARRHEA: 0
BACK PAIN: 1
VOMITING: 1

## 2023-09-15 ASSESSMENT — FIBROSIS 4 INDEX
FIB4 SCORE: 0.46
FIB4 SCORE: 0.66

## 2023-09-15 NOTE — H&P
"Hospital Medicine History & Physical Note    Date of Service  9/15/2023    Primary Care Physician  Pcp Pt States None (she is from [facility])    Consultants  general surgery    Specialist Names: Dr Alberts    Code Status  Full Code    Chief Complaint  Chief Complaint   Patient presents with    Abdominal Pain     The pt was discharged from hospital yesterday for cholecystitis. The pt reports continued abd pain and N/V/D. Pain is 8/10, radiates to right abd, stabbing. The pt reports vomiting a \"bucket full' of bile last night       History of Presenting Illness  Odalys Keenan is a 68 y.o. female w/ Crohns disease (not on any meds), recent Cdiff infection but treated, DLD, HTN, and EtOH abuse.  She has had multiple recent visits to the hospital with concern of cholecystitis for which she had a percutaneous ken tube (placed 8/28/23) but then she pulled it out while having alcohol withdrawal delerium.  Per report she has not been drinking since August.  She is from [facility] but has been moving from place to place.  She presented 9/15/2023 with abdominal pain and ongoing nausea and vomiting.  She was just discharged from the hospital one day ago after a several recent hospitalizations. She states staying at a hotel. Surgery has recently felt this was chronic cholecystitis and no acute surgical intervention.    Patient very somnolent on my evaluation and having a hard time keeping her eyes open.  No respiratory distress. States some abdominal pain but soft on exam.    I discussed the plan of care with patient and bedside RN.    Review of Systems  Review of Systems   Unable to perform ROS: Mental acuity   Constitutional:  Positive for malaise/fatigue.   Respiratory:  Negative for shortness of breath.    Gastrointestinal:  Positive for abdominal pain, nausea and vomiting. Negative for diarrhea.   Musculoskeletal:  Positive for back pain.       Past Medical History   has a past medical history of " Crohn disease (HCC), HLD (hyperlipidemia), and Hypertension.    Surgical History   has no past surgical history on file.     Family History  Parents    Family history reviewed with patient. There is no family history that is pertinent to the chief complaint.     Social History   reports that she has been smoking cigarettes. She has never used smokeless tobacco. She reports current alcohol use. She reports that she does not currently use drugs.    Allergies  Allergies   Allergen Reactions    Nsaids Vomiting       Medications  Prior to Admission Medications   Prescriptions Last Dose Informant Patient Reported? Taking?   DULoxetine (CYMBALTA) 60 MG Cap DR Particles delayed-release capsule 9/15/2023 at AM Patient Yes No   Sig: Take 60 mg by mouth every day.   HYDROmorphone (DILAUDID) 2 MG Tab 9/15/2023 at 0600 Patient No No   Sig: Take 1-2 Tablets by mouth 2 (two) times a day for 5 days.   Icy Hot extra-strength 10-30 % Cream topical cream PRN at PRN Patient No No   Sig: Apply 1 Application topically 4 times a day as needed (to back or abdomen).   Naloxone (NARCAN) 4 MG/0.1ML Liquid PRN at PRN Patient No No   Sig: Spray one actuation in one nostril for overdose. Call 911. May repeat in 2 minutes if needed.   gabapentin (NEURONTIN) 100 MG Cap 9/15/2023 at AM Patient No No   Sig: Take 1 Capsule by mouth 3 times a day.   levETIRAcetam (KEPPRA) 500 MG Tab 9/15/2023 at AM Patient No No   Sig: Take 1 Tablet by mouth 2 times a day.   lidocaine (LIDODERM) 5 % Patch PATCH OFF at PATCH OFF Patient No No   Sig: Place 3 Patches on the skin every 24 hours.   melatonin 5 mg Tab 2023 at PM Patient Yes No   Sig: Take 10 mg by mouth at bedtime as needed (Sleep).   metoprolol SR (TOPROL XL) 25 MG TABLET SR 24 HR 9/15/2023 at AM Patient No No   Sig: Take 1 Tablet by mouth every day.   mirtazapine (REMERON) 15 MG Tab 2023 at PM Patient No No   Sig: Take 2 Tablets by mouth at bedtime.   omeprazole (PRILOSEC) 40 MG  delayed-release capsule 9/15/2023 at AM Patient Yes No   Sig: Take 40 mg by mouth every day.   ondansetron (ZOFRAN) 8 MG Tab 9/15/2023 at AM Patient No No   Sig: Take 1 Tablet by mouth every 8 hours as needed for Nausea/Vomiting.   rosuvastatin (CRESTOR) 20 MG Tab 9/15/2023 at AM Patient Yes No   Sig: Take 20 mg by mouth every day.      Facility-Administered Medications: None       Physical Exam  Temp:  [36.3 °C (97.3 °F)-36.3 °C (97.4 °F)] 36.3 °C (97.4 °F)  Pulse:  [88-98] 98  Resp:  [17-23] 20  BP: (105-150)/(67-90) 127/67  SpO2:  [93 %-98 %] 96 %  Blood Pressure : 118/79   Temperature: 36.3 °C (97.3 °F)   Pulse: 97   Respiration: 17   Pulse Oximetry: 98 %       Physical Exam  Vitals reviewed.   Constitutional:       Appearance: Normal appearance. She is not diaphoretic.   HENT:      Head: Normocephalic and atraumatic.      Nose: Nose normal.      Mouth/Throat:      Pharynx: No oropharyngeal exudate.   Eyes:      General: No scleral icterus.        Right eye: No discharge.         Left eye: No discharge.      Extraocular Movements: Extraocular movements intact.      Conjunctiva/sclera: Conjunctivae normal.   Cardiovascular:      Rate and Rhythm: Normal rate and regular rhythm.      Pulses:           Radial pulses are 2+ on the right side and 2+ on the left side.        Dorsalis pedis pulses are 2+ on the right side and 2+ on the left side.      Heart sounds: No murmur heard.  Pulmonary:      Effort: Pulmonary effort is normal. No respiratory distress.      Breath sounds: Normal breath sounds. No wheezing or rales.   Abdominal:      General: Bowel sounds are normal. There is no distension.      Palpations: Abdomen is soft.      Tenderness: There is no abdominal tenderness. There is no guarding or rebound.   Musculoskeletal:         General: No swelling or tenderness.      Cervical back: Neck supple. No tenderness. No muscular tenderness.      Right lower leg: No edema.      Left lower leg: No edema.  "  Lymphadenopathy:      Cervical: No cervical adenopathy.   Skin:     Coloration: Skin is not jaundiced or pale.   Neurological:      General: No focal deficit present.      Mental Status: She is oriented to person, place, and time. Mental status is at baseline. She is lethargic.      Cranial Nerves: No cranial nerve deficit.      Motor: No weakness.   Psychiatric:         Mood and Affect: Mood normal.         Behavior: Behavior normal. Behavior is cooperative.         Laboratory:  Recent Labs     09/13/23  0252 09/15/23  1309   WBC 5.5 6.0   RBC 2.74* 3.60*   HEMOGLOBIN 8.5* 11.2*   HEMATOCRIT 26.3* 34.9*   MCV 96.0 96.9   MCH 31.0 31.1   MCHC 32.3 32.1*   RDW 46.8 48.4   PLATELETCT 522* 671*   MPV 10.7 10.7     Recent Labs     09/13/23  0252 09/14/23  0215 09/15/23  1309   SODIUM 141 140 144   POTASSIUM 3.6 4.1 3.8   CHLORIDE 107 107 105   CO2 23 24 22   GLUCOSE 102* 127* 92   BUN 2* 2* 2*   CREATININE 0.49* 0.55 0.60   CALCIUM 8.3* 8.5 10.2     Recent Labs     09/13/23 0252 09/14/23 0215 09/15/23  1309   ALTSGPT 8 12 13   ASTSGOT 11* 18 17   ALKPHOSPHAT 223* 235* 304*   TBILIRUBIN 0.2 0.2 0.4   GAMMAGT  --  107*  --    LIPASE  --   --  40   GLUCOSE 102* 127* 92         No results for input(s): \"NTPROBNP\" in the last 72 hours.      No results for input(s): \"TROPONINT\" in the last 72 hours.    Imaging:  [id number] [id number]   Final Result      1.  Unremarkable 2 views of the abdomen.            Assessment/Plan:  Justification for Admission Status  I anticipate this patient is appropriate for observation status at this time because nausea and vomiting.    Patient will need a Med/Surg bed on MEDICAL service .  The need is secondary to varify resolve of nausea and vomiting and making sure she can tolerate a diet.    * Nausea and vomiting- (present on admission)  Assessment & Plan  Normal lipase  Check covid  Chronic cholecystitis?  Antiemetics prn  IV fluids  Diet as tolerates  Omeprazole    Substance abuse " (HCC)  Assessment & Plan  Alcohol abuse with kwesi on prior admit  Check UDS  Avoid narcotics as able.    Chronic cholecystitis- (present on admission)  Assessment & Plan  WBC normal.  She is suppose to follow up outpatient with a surgeon for future surgery.  Dr Alberts's consult appreciated    Hypomagnesemia- (present on admission)  Assessment & Plan  Giving 4 gram IV MgSO4  Diet as tolerates    Crohn's disease (HCC)- (present on admission)  Assessment & Plan  Follow up with GI outpatient  Check ESR        VTE prophylaxis: SCDs/TEDs

## 2023-09-15 NOTE — ED PROVIDER NOTES
"ED Provider Note    CHIEF COMPLAINT  Chief Complaint   Patient presents with    Abdominal Pain     The pt was discharged from hospital yesterday for cholecystitis. The pt reports continued abd pain and N/V/D. Pain is 8/10, radiates to right abd, stabbing. The pt reports vomiting a \"bucket full' of bile last night       EXTERNAL RECORDS REVIEWED  Inpatient Notes the patient was admitted to the hospital 9/9/2023 to 9/14/2023 and was just discharged home yesterday she presented to the hospital with abdominal pain for a cholecystitis.  She had a cholecystectomy and a cholecystotomy drain was placed August 25 during her admission in September she had some problems with alcohol withdrawal and surgery was consulted and initially recommended undergoing reinsertion of the cholecystotomy tube patient's alcohol withdrawal was managed she was diagnosed with acute cholecystitis was discharged home yesterday to follow-up as an outpatient with surgery for an elective cholecystectomy.  On antibiotics and did develop diarrhea.  She did have a C. difficile infection 2 months ago but her C. difficile toxin came back negative on her last hospital visit this week    HPI/ROS  LIMITATION TO HISTORY   Select: : None  OUTSIDE HISTORIAN(S):  none    Odalys Keenan is a 68 y.o. female who presents complaining of vomiting of bilious material continued abdominal pain weakness and dehydration since her discharge yesterday from the hospital.  Patient states that she was admitted to the hospital and they found that she had cholecystitis.  Her insurance is in [place] and she is currently staying here in [place] in a hotel.  She states that they could not take her gallbladder out here because she needed her insurance to pay for it.  She states that since her discharge she went back to the [facility] and was very fatigued.  She then vomited up a large amount of bilious material.  The patient has not eaten or drank anything since her discharge from " "the hospital yesterday.  She has no vomiting of blood and states that the vomitus was bile.  She denies any dark tarry stools or blood in her stools.  She has not had any further fevers or chills.  She states that she has to drive back to [place] and does have a surgeon set up for her when she gets there but has been too weak to drive home.  The patient did have alcohol withdrawal during her last hospitalization.  She states she has not drank any alcohol since prior to her admission.    PAST MEDICAL HISTORY   has a past medical history of Crohn disease (HCC), HLD (hyperlipidemia), and Hypertension.    SURGICAL HISTORY  patient denies any surgical history    FAMILY HISTORY  No family history on file.    SOCIAL HISTORY  Social History     Tobacco Use    Smoking status: Some Days     Types: Cigarettes    Smokeless tobacco: Never   Vaping Use    Vaping Use: Never used   Substance and Sexual Activity    Alcohol use: Yes     Comment: \"quite a bit\"    Drug use: Not Currently    Sexual activity: Not on file       CURRENT MEDICATIONS  Home Medications       Reviewed by Dolores Ruby (Pharmacy Tech) on 09/15/23 at 1536  Med List Status: Complete     Medication Last Dose Status   DULoxetine (CYMBALTA) 60 MG Cap DR Particles delayed-release capsule 9/15/2023 Active   gabapentin (NEURONTIN) 100 MG Cap 9/15/2023 Active   HYDROmorphone (DILAUDID) 2 MG Tab 9/15/2023 Active   Icy Hot extra-strength 10-30 % Cream topical cream PRN Active   levETIRAcetam (KEPPRA) 500 MG Tab 9/15/2023 Active   lidocaine (LIDODERM) 5 % Patch PATCH OFF Active   melatonin 5 mg Tab 9/14/2023 Active   metoprolol SR (TOPROL XL) 25 MG TABLET SR 24 HR 9/15/2023 Active   mirtazapine (REMERON) 15 MG Tab 9/14/2023 Active   Naloxone (NARCAN) 4 MG/0.1ML Liquid PRN Active   omeprazole (PRILOSEC) 40 MG delayed-release capsule 9/15/2023 Active   ondansetron (ZOFRAN) 8 MG Tab 9/15/2023 Active   rosuvastatin (CRESTOR) 20 MG Tab 9/15/2023 Active              " "      ALLERGIES  Allergies   Allergen Reactions    Nsaids Vomiting       PHYSICAL EXAM  VITAL SIGNS: /79   Pulse 97   Temp 36.3 °C (97.3 °F) (Temporal)   Resp 17   Ht 1.727 m (5' 8\")   Wt 62.6 kg (138 lb 0.1 oz)   SpO2 98%   BMI 20.98 kg/m²      Constitutional: Well developed, Well nourished, No acute distress, Non-toxic appearance.   HEENT: Normocephalic, Atraumatic,  external ears normal, pharynx pink,  Mucous  Membranes dry no rhinorrhea or mucosal edema  Eyes: PERRL, EOMI, Conjunctiva normal, No discharge.   Neck: Normal range of motion, No tenderness, Supple, No stridor.   Lymphatic: No lymphadenopathy    Cardiovascular: Regular Rate and Rhythm, No murmurs,  rubs, or gallops.   Thorax & Lungs: Lungs clear to auscultation bilaterally, No respiratory distress, No wheezes, rhales or rhonchi, No chest wall tenderness.   Abdomen: Bowel sounds normal, Soft, diffuse tenderness to palpation without rebound masses or peritoneal signs non distended,  No pulsatile masses.,   Skin: Warm, Dry, No erythema, No rash, pale  Back:  No CVA tenderness,  No spinal tenderness, bony crepitance step offs or instability.   Extremities: Equal, intact distal pulses, No cyanosis, clubbing or edema,  No tenderness.   Musculoskeletal: Good range of motion in all major joints. No tenderness to palpation or major deformities noted.   Neurologic: Alert & oriented No focal deficits noted.  Psychiatric: Affect normal, Judgment normal, Mood normal.      DIAGNOSTIC STUDIES / PROCEDURES  EKG  I have independently interpreted this EKG  See below    LABS  Results for orders placed or performed during the hospital encounter of 09/15/23   CBC WITH DIFFERENTIAL   Result Value Ref Range    WBC 6.0 4.8 - 10.8 K/uL    RBC 3.60 (L) 4.20 - 5.40 M/uL    Hemoglobin 11.2 (L) 12.0 - 16.0 g/dL    Hematocrit 34.9 (L) 37.0 - 47.0 %    MCV 96.9 81.4 - 97.8 fL    MCH 31.1 27.0 - 33.0 pg    MCHC 32.1 (L) 32.2 - 35.5 g/dL    RDW 48.4 35.9 - 50.0 fL    " Platelet Count 671 (H) 164 - 446 K/uL    MPV 10.7 9.0 - 12.9 fL    Neutrophils-Polys 65.30 44.00 - 72.00 %    Lymphocytes 23.80 22.00 - 41.00 %    Monocytes 7.50 0.00 - 13.40 %    Eosinophils 1.30 0.00 - 6.90 %    Basophils 1.80 0.00 - 1.80 %    Immature Granulocytes 0.30 0.00 - 0.90 %    Nucleated RBC 0.00 0.00 - 0.20 /100 WBC    Neutrophils (Absolute) 3.91 1.82 - 7.42 K/uL    Lymphs (Absolute) 1.43 1.00 - 4.80 K/uL    Monos (Absolute) 0.45 0.00 - 0.85 K/uL    Eos (Absolute) 0.08 0.00 - 0.51 K/uL    Baso (Absolute) 0.11 0.00 - 0.12 K/uL    Immature Granulocytes (abs) 0.02 0.00 - 0.11 K/uL    NRBC (Absolute) 0.00 K/uL   COMP METABOLIC PANEL   Result Value Ref Range    Sodium 144 135 - 145 mmol/L    Potassium 3.8 3.6 - 5.5 mmol/L    Chloride 105 96 - 112 mmol/L    Co2 22 20 - 33 mmol/L    Anion Gap 17.0 (H) 7.0 - 16.0    Glucose 92 65 - 99 mg/dL    Bun 2 (L) 8 - 22 mg/dL    Creatinine 0.60 0.50 - 1.40 mg/dL    Calcium 10.2 8.5 - 10.5 mg/dL    Correct Calcium 10.3 8.5 - 10.5 mg/dL    AST(SGOT) 17 12 - 45 U/L    ALT(SGPT) 13 2 - 50 U/L    Alkaline Phosphatase 304 (H) 30 - 99 U/L    Total Bilirubin 0.4 0.1 - 1.5 mg/dL    Albumin 3.9 3.2 - 4.9 g/dL    Total Protein 7.3 6.0 - 8.2 g/dL    Globulin 3.4 1.9 - 3.5 g/dL    A-G Ratio 1.1 g/dL   LIPASE   Result Value Ref Range    Lipase 40 11 - 82 U/L   LACTIC ACID   Result Value Ref Range    Lactic Acid 1.1 0.5 - 2.0 mmol/L   DIAGNOSTIC ALCOHOL   Result Value Ref Range    Diagnostic Alcohol <10.1 <10.1 mg/dL   ESTIMATED GFR   Result Value Ref Range    GFR (CKD-EPI) 99 >60 mL/min/1.73 m 2         RADIOLOGY  I have independently interpreted the diagnostic imaging associated with this visit and am waiting the final reading from the radiologist.   My preliminary interpretation is as follows: xray abd: No obstruction or free air  Radiologist interpretation:   [id number] VIEW   Final Result      1.  Unremarkable 2 views of the abdomen.           COURSE & MEDICAL DECISION  MAKING    ED Observation Status? Yes; I am placing the patient in to an observation status due to a diagnostic uncertainty as well as therapeutic intensity. Patient placed in observation status at 1:13 PM, 9/15/2023.     Observation plan is as follows: Lab work IV fluids pain control x-ray of the abdomen    Upon Reevaluation, the patient's condition has: not improved; and will be escalated to hospitalization.    Patient discharged from ED Observation status at 3:18p (Time) 9/15/23 (Date).     INITIAL ASSESSMENT, COURSE AND PLAN  Care Narrative: Odalys Keenan is a 68 y.o. female who presents complaining of vomiting of bilious material continued abdominal pain weakness and dehydration since her discharge yesterday from the hospital.  Patient states that she was admitted to the hospital and they found that she had cholecystitis.  Her insurance is in [place] and she is currently staying here in [place] in a hotel.  She states that they could not take her gallbladder out here because she needed her insurance to pay for it.  She states that since her discharge she went back to the [facility] and was very fatigued.  She then vomited up a large amount of bilious material.  The patient has not eaten or drank anything since her discharge from the hospital yesterday.  She has no vomiting of blood and states that the vomitus was bile.  She denies any dark tarry stools or blood in her stools.  She has not had any further fevers or chills.  She states that she has to drive back to [place] and does have a surgeon set up for her when she gets there but has been too weak to drive home.  The patient did have alcohol withdrawal during her last hospitalization.  She states she has not drank any alcohol since prior to her admission.  On physical exam her mucous membranes are dry and she is pale appearing she has diffuse abdominal tenderness she has no focal neurologic deficits heart is regular rate and rhythm and her blood pressure  stable she is afebrile.  HYDRATION: Based on the patient's presentation of Acute Vomiting the patient was given IV fluids. IV Hydration was used because oral hydration was not adequate alone. Upon recheck following hydration, the patient was improved.    DDX : Worsening cholecystitis, sepsis, dehydration, bowel obstruction  ADDITIONAL PROBLEM LIST  Hypertension  High cholesterol  Crohn's disease  Alcohol and opiate dependence  DISPOSITION AND DISCUSSIONS    Patient's white blood cell count is normal at 6 hemoglobin 11.2 platelet count 671 with a normal differential.  Her lipase is normal at 40.  Comprehensive metabolic panel has an elevated anion gap of 17 CO2 is 22 potassium is 3.8 BUN is 2 creatinine 0.6 liver function tests are normal her alk phos is elevated at 304.  Bilirubin is normal.  Her initial lactic acid is 1.1.  Patient's abdominal x-ray does not show any evidence of air-fluid levels or obstruction.    I ordered IV fluids morphine and nausea medicine for the patient.  On recheck she is still very weak and dehydrated.  We attempted to ambulate her to the bathroom and she could barely make it there.  I do not think she is safe for discharge home.  She will be admitted for IV fluids for rehydration and further evaluation.  I did discuss her case with general surgery who states that she really needs to have her gallbladder removed electively and cannot have it removed on an emergent basis.  He will come consult on her case.    Spoke with the hospitalist will admit her for pain control rehydration and further care.    I have discussed management of the patient with the following physicians and MELLISA's: I spoke with general surgery Dr. Alberts who states that she is going to have a scarred in gallbladder and it was going to be a very complicated surgery and she would need to have it done as an outpatient.  I spoke with the hospitalist Dr. Rodriguez who will admit the patient for IV fluids and further  care    Discussion of management with other [facility] or appropriate source(s): Social Work who states that her sister was supposed to secure a flight to [place] but this is not happened    Escalation of care considered, and ultimately not performed:none    Barriers to care at this time, including but not limited to: Patient does not have established PCP.  She has no local medical insurance    Decision tools and prescription drugs considered including, but not limited to: Pain Medications morphine .    Patient will be admitted in guarded condition  FINAL DIAGNOSIS  1. Nausea and vomiting, unspecified vomiting type    2. Generalized abdominal pain    3. Dehydration    4. Weakness           Electronically signed by: Dilcia Edwards M.D., 9/15/2023 1:07 PM

## 2023-09-15 NOTE — ED TRIAGE NOTES
"Chief Complaint   Patient presents with    Abdominal Pain     The pt was discharged from hospital yesterday for cholecystitis. The pt reports continued abd pain and N/V/D. Pain is 8/10, radiates to right abd, stabbing. The pt reports vomiting a \"bucket full' of bile last night       Pt wheelchair to triage. Pt A&Ox4, for the above complaint.     Pt to lobby . Pt educated on alerting staff in changes to condition. Pt verbalized understanding. Protocol abd pain ordered.     /79   Pulse 97   Temp 36.3 °C (97.3 °F) (Temporal)   Resp 17   Ht 1.727 m (5' 8\")   Wt 62.6 kg (138 lb 0.1 oz)   SpO2 98%   BMI 20.98 kg/m²     "

## 2023-09-15 NOTE — ED NOTES
Admitting physician at bedside. Pt difficult to arouse. Pt states she is tired and is quick to fall asleep. VSS. Admitting physician aware.

## 2023-09-15 NOTE — PROGRESS NOTES
Received verbal report @1625 and pt arrived on floor @1627 vitals taken. Pt A&Ox4 and resting in bed.

## 2023-09-15 NOTE — ED NOTES
Pt called needing to go the the bathroom, pt stood and was unsteady. This RN wheeled pt to the bathroom and assisted. Pt very unsteady on her feet.

## 2023-09-16 PROBLEM — R53.1 WEAKNESS: Status: ACTIVE | Noted: 2023-09-16

## 2023-09-16 PROBLEM — U07.1 COVID-19 VIRUS INFECTION: Status: ACTIVE | Noted: 2023-09-16

## 2023-09-16 LAB
ALBUMIN SERPL BCP-MCNC: 3 G/DL (ref 3.2–4.9)
ALBUMIN/GLOB SERPL: 1.1 G/DL
ALP SERPL-CCNC: 232 U/L (ref 30–99)
ALT SERPL-CCNC: 13 U/L (ref 2–50)
ANION GAP SERPL CALC-SCNC: 13 MMOL/L (ref 7–16)
AST SERPL-CCNC: 24 U/L (ref 12–45)
BILIRUB SERPL-MCNC: 0.2 MG/DL (ref 0.1–1.5)
BUN SERPL-MCNC: 2 MG/DL (ref 8–22)
CALCIUM ALBUM COR SERPL-MCNC: 9.9 MG/DL (ref 8.5–10.5)
CALCIUM SERPL-MCNC: 9.1 MG/DL (ref 8.5–10.5)
CHLORIDE SERPL-SCNC: 108 MMOL/L (ref 96–112)
CO2 SERPL-SCNC: 23 MMOL/L (ref 20–33)
CREAT SERPL-MCNC: 0.57 MG/DL (ref 0.5–1.4)
ERYTHROCYTE [DISTWIDTH] IN BLOOD BY AUTOMATED COUNT: 49.3 FL (ref 35.9–50)
GFR SERPLBLD CREATININE-BSD FMLA CKD-EPI: 100 ML/MIN/1.73 M 2
GLOBULIN SER CALC-MCNC: 2.8 G/DL (ref 1.9–3.5)
GLUCOSE SERPL-MCNC: 97 MG/DL (ref 65–99)
HCT VFR BLD AUTO: 32 % (ref 37–47)
HGB BLD-MCNC: 10 G/DL (ref 12–16)
MCH RBC QN AUTO: 30.8 PG (ref 27–33)
MCHC RBC AUTO-ENTMCNC: 31.3 G/DL (ref 32.2–35.5)
MCV RBC AUTO: 98.5 FL (ref 81.4–97.8)
PLATELET # BLD AUTO: 575 K/UL (ref 164–446)
PMV BLD AUTO: 10.6 FL (ref 9–12.9)
POTASSIUM SERPL-SCNC: 3.7 MMOL/L (ref 3.6–5.5)
PROT SERPL-MCNC: 5.8 G/DL (ref 6–8.2)
RBC # BLD AUTO: 3.25 M/UL (ref 4.2–5.4)
SODIUM SERPL-SCNC: 144 MMOL/L (ref 135–145)
WBC # BLD AUTO: 3.9 K/UL (ref 4.8–10.8)

## 2023-09-16 PROCEDURE — 80053 COMPREHEN METABOLIC PANEL: CPT

## 2023-09-16 PROCEDURE — G0378 HOSPITAL OBSERVATION PER HR: HCPCS

## 2023-09-16 PROCEDURE — A9270 NON-COVERED ITEM OR SERVICE: HCPCS | Performed by: INTERNAL MEDICINE

## 2023-09-16 PROCEDURE — A9270 NON-COVERED ITEM OR SERVICE: HCPCS | Performed by: HOSPITALIST

## 2023-09-16 PROCEDURE — 96376 TX/PRO/DX INJ SAME DRUG ADON: CPT

## 2023-09-16 PROCEDURE — 700105 HCHG RX REV CODE 258: Performed by: HOSPITALIST

## 2023-09-16 PROCEDURE — 700111 HCHG RX REV CODE 636 W/ 250 OVERRIDE (IP): Performed by: INTERNAL MEDICINE

## 2023-09-16 PROCEDURE — 99233 SBSQ HOSP IP/OBS HIGH 50: CPT | Performed by: INTERNAL MEDICINE

## 2023-09-16 PROCEDURE — 700111 HCHG RX REV CODE 636 W/ 250 OVERRIDE (IP): Mod: JZ | Performed by: HOSPITALIST

## 2023-09-16 PROCEDURE — 700102 HCHG RX REV CODE 250 W/ 637 OVERRIDE(OP)

## 2023-09-16 PROCEDURE — 96372 THER/PROPH/DIAG INJ SC/IM: CPT

## 2023-09-16 PROCEDURE — A9270 NON-COVERED ITEM OR SERVICE: HCPCS

## 2023-09-16 PROCEDURE — 700102 HCHG RX REV CODE 250 W/ 637 OVERRIDE(OP): Performed by: INTERNAL MEDICINE

## 2023-09-16 PROCEDURE — 85027 COMPLETE CBC AUTOMATED: CPT

## 2023-09-16 PROCEDURE — 700102 HCHG RX REV CODE 250 W/ 637 OVERRIDE(OP): Performed by: HOSPITALIST

## 2023-09-16 RX ORDER — MORPHINE SULFATE 4 MG/ML
4 INJECTION INTRAVENOUS
Status: DISCONTINUED | OUTPATIENT
Start: 2023-09-16 | End: 2023-09-17

## 2023-09-16 RX ORDER — SIMETHICONE 125 MG
125 TABLET,CHEWABLE ORAL 3 TIMES DAILY PRN
Status: DISCONTINUED | OUTPATIENT
Start: 2023-09-16 | End: 2023-09-17 | Stop reason: HOSPADM

## 2023-09-16 RX ORDER — HYDROCODONE BITARTRATE AND ACETAMINOPHEN 10; 325 MG/1; MG/1
1 TABLET ORAL EVERY 4 HOURS PRN
Status: DISCONTINUED | OUTPATIENT
Start: 2023-09-16 | End: 2023-09-17

## 2023-09-16 RX ORDER — TRAMADOL HYDROCHLORIDE 50 MG/1
50 TABLET ORAL ONCE
Status: COMPLETED | OUTPATIENT
Start: 2023-09-16 | End: 2023-09-16

## 2023-09-16 RX ADMIN — HYDROCODONE BITARTRATE AND ACETAMINOPHEN 1 TABLET: 10; 325 TABLET ORAL at 14:30

## 2023-09-16 RX ADMIN — GABAPENTIN 100 MG: 100 CAPSULE ORAL at 17:29

## 2023-09-16 RX ADMIN — SODIUM CHLORIDE, POTASSIUM CHLORIDE, SODIUM LACTATE AND CALCIUM CHLORIDE: 600; 310; 30; 20 INJECTION, SOLUTION INTRAVENOUS at 23:45

## 2023-09-16 RX ADMIN — MIRTAZAPINE 30 MG: 30 TABLET, FILM COATED ORAL at 19:33

## 2023-09-16 RX ADMIN — LEVETIRACETAM 500 MG: 500 TABLET, FILM COATED ORAL at 17:29

## 2023-09-16 RX ADMIN — HYDROCODONE BITARTRATE AND ACETAMINOPHEN 1 TABLET: 10; 325 TABLET ORAL at 10:29

## 2023-09-16 RX ADMIN — MORPHINE SULFATE 4 MG: 4 INJECTION INTRAVENOUS at 15:27

## 2023-09-16 RX ADMIN — SODIUM CHLORIDE, POTASSIUM CHLORIDE, SODIUM LACTATE AND CALCIUM CHLORIDE: 600; 310; 30; 20 INJECTION, SOLUTION INTRAVENOUS at 11:35

## 2023-09-16 RX ADMIN — TRAMADOL HYDROCHLORIDE 50 MG: 50 TABLET ORAL at 23:45

## 2023-09-16 RX ADMIN — MORPHINE SULFATE 4 MG: 4 INJECTION INTRAVENOUS at 12:14

## 2023-09-16 RX ADMIN — SIMETHICONE 125 MG: 125 TABLET, CHEWABLE ORAL at 03:54

## 2023-09-16 RX ADMIN — ENOXAPARIN SODIUM 40 MG: 100 INJECTION SUBCUTANEOUS at 17:29

## 2023-09-16 RX ADMIN — HYDROCODONE BITARTRATE AND ACETAMINOPHEN 1 TABLET: 10; 325 TABLET ORAL at 19:33

## 2023-09-16 RX ADMIN — GABAPENTIN 100 MG: 100 CAPSULE ORAL at 11:31

## 2023-09-16 ASSESSMENT — PAIN DESCRIPTION - PAIN TYPE
TYPE: ACUTE PAIN
TYPE: ACUTE PAIN;CHRONIC PAIN
TYPE: CHRONIC PAIN;ACUTE PAIN
TYPE: ACUTE PAIN

## 2023-09-16 ASSESSMENT — ENCOUNTER SYMPTOMS
VOMITING: 1
NAUSEA: 1

## 2023-09-16 NOTE — CONSULTS
DATE OF CONSULTATION:  9/15/2023       CONSULTING PHYSICIAN:  Hermes Alberts M.D.     REASON FOR CONSULTATION:  Abdominal pain and nausea    HISTORY OF PRESENT ILLNESS: Patient is a 69 yo female with history of polysubstance use, multiple admissions for withdrawal related symptoms who presented here recently for diffuse abdominal pain and at the time she had a cholecystostomy tube.  She was treated for withdrawal that admission and her C-tube was dislodged.  She was monitored for any signs of cholecystitis and ultimately discharged with plan to follow up with an elective surgeon in [place] near her home.  She presented again today after a bought of nausea, vomiting, and diarrhea.  She has chronic diffuse abdominal pain which is unchanged from her baseline.  Her previous C tube site has healed well.      PAST MEDICAL HISTORY:  has a past medical history of Crohn disease (HCC), HLD (hyperlipidemia), and Hypertension.    PAST SURGICAL HISTORY:  has no past surgical history on file.    ALLERGIES:   Allergies   Allergen Reactions    Nsaids Vomiting       CURRENT MEDICATIONS:    Home Medications       Reviewed by Dolores Ruby (Pharmacy Tech) on 09/15/23 at 1536  Med List Status: Complete     Medication Last Dose Status   DULoxetine (CYMBALTA) 60 MG Cap DR Particles delayed-release capsule 9/15/2023 Active   gabapentin (NEURONTIN) 100 MG Cap 9/15/2023 Active   HYDROmorphone (DILAUDID) 2 MG Tab 9/15/2023 Active   Icy Hot extra-strength 10-30 % Cream topical cream PRN Active   levETIRAcetam (KEPPRA) 500 MG Tab 9/15/2023 Active   lidocaine (LIDODERM) 5 % Patch PATCH OFF Active   melatonin 5 mg Tab 9/14/2023 Active   metoprolol SR (TOPROL XL) 25 MG TABLET SR 24 HR 9/15/2023 Active   mirtazapine (REMERON) 15 MG Tab 9/14/2023 Active   Naloxone (NARCAN) 4 MG/0.1ML Liquid PRN Active   omeprazole (PRILOSEC) 40 MG delayed-release capsule 9/15/2023 Active   ondansetron (ZOFRAN) 8 MG Tab 9/15/2023 Active   rosuvastatin  "(CRESTOR) 20 MG Tab 9/15/2023 Active                    FAMILY HISTORY: family history is not on file.    SOCIAL HISTORY:  reports that she has been smoking cigarettes. She has never used smokeless tobacco. She reports current alcohol use. She reports that she does not currently use drugs.    REVIEW OF SYSTEMS: Review of systems is remarkable for the following abdominal pain, nausea, vomiting, and diarrhea. The remainder of the comprehensive ROS is negative with the exception of the aforementioned HPI, PMH, and PSH bullets in accordance with CMS guideline.    PHYSICAL EXAMINATION:    /69   Pulse 88   Temp 36.3 °C (97.4 °F) (Temporal)   Resp 18   Ht 1.727 m (5' 8\")   Wt 56 kg (123 lb 7.3 oz)   SpO2 94%   BMI 18.77 kg/m²     Alert, non-toxic, frail appearing  Abdomen is soft and mildly tender along the right side  Prior cholecystostomy site is well healed, no erythema    LABORATORY VALUES:   Recent Labs     09/13/23  0252 09/15/23  1309   WBC 5.5 6.0   RBC 2.74* 3.60*   HEMOGLOBIN 8.5* 11.2*   HEMATOCRIT 26.3* 34.9*   MCV 96.0 96.9   MCH 31.0 31.1   MCHC 32.3 32.1*   RDW 46.8 48.4   PLATELETCT 522* 671*   MPV 10.7 10.7     Recent Labs     09/13/23  0252 09/14/23  0215 09/15/23  1309   SODIUM 141 140 144   POTASSIUM 3.6 4.1 3.8   CHLORIDE 107 107 105   CO2 23 24 22   GLUCOSE 102* 127* 92   BUN 2* 2* 2*   CREATININE 0.49* 0.55 0.60   CALCIUM 8.3* 8.5 10.2     Recent Labs     09/13/23 0252 09/14/23 0215 09/15/23  1309   ASTSGOT 11* 18 17   ALTSGPT 8 12 13   TBILIRUBIN 0.2 0.2 0.4   ALKPHOSPHAT 223* 235* 304*   GLOBULIN 2.5 2.8 3.4            IMAGING:   [id number] VIEW   Final Result      1.  Unremarkable 2 views of the abdomen.          ASSESSMENT AND PLAN:   67 yo female with recent dislodgement of a cholecystostomy tube during admission for alcohol withdrawal, who presents today with overnight bought of nausea, vomiting, and diarrhea.  Surgery is consulted for possible cholecystectomy.  I feel strongly " that her current abdominal pain is at the chronic baseline and the vomiting and diarrhea are unlikely to be related to any biliary cause.  There is no evidence of cholecystitis on exam or lab workup today.  She should continue with the previously stated plan to return home to [place] and seek out a surgeon to consider elective cholecystectomy should she desire but there is no indication for urgent surgery or replacement of the C-tube at this time.  Please re consult should her condition change or worsen.       ____________________________________     Hermes Alberts M.D.    DD: 9/15/2023  6:00 PM    AAST Grading System for EGS Conditions  ACS NSQIP Surgical Risk Calculator

## 2023-09-16 NOTE — PROGRESS NOTES
Patient awake, A&O x4. Patient answering questions appropriately but doesn't recall what happened last night. Updated patient on what happened last night and positive covid test. All questions addressed. Call light within reach.

## 2023-09-16 NOTE — PROGRESS NOTES
4 Eyes Skin Assessment Completed by Latisha RN and PIERO Perez.     Head WDL  Ears WDL  Nose WDL  Mouth WDL  Neck WDL  Breast/Chest WDL  Shoulder Blades WDL  Spine WDL  (R) Arm/Elbow/Hand WDL  (L) Arm/Elbow/Hand WDL  Abdomen Healing surgical site from cholecystotomy drain  Groin WDL  Scrotum/Coccyx/Buttocks WDL  (R) Leg WDL  (L) Leg WDL  (R) Heel/Foot/Toe WDL  (L) Heel/Foot/Toe WDL          Devices In Places Pulse Ox and Nasal Cannula      Interventions In Place Pillows    Possible Skin Injury No    Pictures Uploaded Into Epic N/A  Wound Consult Placed N/A  RN Wound Prevention Protocol Ordered No

## 2023-09-16 NOTE — ASSESSMENT & PLAN NOTE
Normal lipase  Covid positive  Could be related to chronic cholecystitis  Antiemetics prn  IV fluids  Diet as tolerates  Omeprazole

## 2023-09-16 NOTE — ASSESSMENT & PLAN NOTE
WBC normal.  She is suppose to follow up outpatient with a surgeon in [place] for elective cholecystectomy  Surgery Dr Alberts recommends no acute surgical intervention or cholecystostomy tube placement at this time

## 2023-09-16 NOTE — CARE PLAN
The patient is a Watcher    Shift Goals  Clinical Goals: Pain control, IV fluids  Patient Goals: Rest  Family Goals: Not at bedside    Unable to assess understanding of education and treatment plan due to patient being very lethargic and difficult to arouse. Reinforcement needed.     Problem: Pain - Standard  Goal: Alleviation of pain or a reduction in pain to the patient’s comfort goal  Outcome: Not Progressing     Problem: Knowledge Deficit - Standard  Goal: Patient and family/care givers will demonstrate understanding of plan of care, disease process/condition, diagnostic tests and medications  Outcome: Not Progressing

## 2023-09-16 NOTE — PROGRESS NOTES
Patient very lethargic and difficult to arouse. Patient responds to pain but unable to stay awake to answer questions. Patient O2 destating down to 80%, put on 2 L NC. MD and charge notified.

## 2023-09-16 NOTE — ASSESSMENT & PLAN NOTE
Currently not requiring supplemental oxygen  Contact/eye/droplet precautions in place  Supportive care

## 2023-09-16 NOTE — PROGRESS NOTES
Hospital Medicine Daily Progress Note    Date of Service  9/16/2023    Chief Complaint  Odalys Keenan is a 68 y.o. female admitted 9/15/2023 with nausea and vomiting    Hospital Course  68-year-old female with a past medical history of Crohn's disease, C. difficile infection, DLD, hypertension, alcohol abuse who presented with nausea and vomiting.  She has had multiple recent visits to the hospital for concerns of cholecystitis for which she underwent a IR guided cholecystostomy tube placement on 8/28/2023.  Her most recent admission was on 9/9/2023 for right upper quadrant abdominal pain and vomiting.  She then became agitated secondary to alcohol withdrawal and pulled out her cholecystostomy tube.  Surgery was consulted and patient was started on IV antibiotics.  She underwent a CT abdomen pelvis with IV contrast on 9/13/2023 demonstrated subcapsular fluid collection anterior to the medial segment left hepatic lobe which measures 2.1 x 5.9 cm, gallbladder is decompressed and does not demonstrate evidence of significant interval increase in pericholecystic or perihepatic fluid.  Redemonstrated cystic rectosigmoid colonic wall thickening which could be seen in the setting of infectious or inflammatory colitis.  CT reviewed by surgery Dr. Funk with no plans for acute surgical intervention with plans for patient to follow-up with a surgeon in [place] for elective surgery.  Patient was then discharged to a motel where she felt weak and suddenly developed large voluminous bilious vomiting and therefore presented back to the ER      Interval Problem Update  Patient reports ongoing abdominal pain which she states is chronic for her.  She I have added Norco 10 for pain control.  Patient states she is allergic to NSAIDs.  Continue IV fluid hydration and supportive care for nausea and vomiting along with antiemetics.  Patient was seen by Dr. Hermes Alberts with surgery who feels her current abdominal pain is at her  chronic baseline level and the vomiting and diarrhea are unlikely related to any biliary cause.  No acute surgical intervention was recommended at this time and she is to follow-up with the surgeon in [place] to consider elective cholecystectomy.    I have discussed this patient's plan of care and discharge plan at IDT rounds today with Case Management, Nursing, Nursing leadership, and other members of the IDT team.    Consultants/Specialty  general surgery    Code Status  Full Code    Disposition  Medically Cleared  I have placed the appropriate orders for post-discharge needs.    Review of Systems  Review of Systems   Constitutional:  Positive for malaise/fatigue.   Gastrointestinal:  Positive for nausea and vomiting.        Physical Exam  Temp:  [36.2 °C (97.2 °F)-36.4 °C (97.6 °F)] 36.2 °C (97.2 °F)  Pulse:  [79-98] 91  Resp:  [16-23] 17  BP: (105-155)/(63-90) 155/86  SpO2:  [77 %-100 %] 94 %    Physical Exam  Vitals and nursing note reviewed.   Constitutional:       General: She is not in acute distress.  HENT:      Head: Normocephalic.      Mouth/Throat:      Mouth: Mucous membranes are moist.   Eyes:      Pupils: Pupils are equal, round, and reactive to light.   Cardiovascular:      Rate and Rhythm: Normal rate and regular rhythm.      Pulses: Normal pulses.      Heart sounds: Normal heart sounds.   Pulmonary:      Effort: Pulmonary effort is normal.      Breath sounds: Normal breath sounds.   Abdominal:      Palpations: Abdomen is soft.      Tenderness: There is abdominal tenderness (Mild diffuse). There is no guarding or rebound.   Musculoskeletal:         General: No swelling.      Cervical back: Neck supple.   Skin:     General: Skin is warm.      Coloration: Skin is not jaundiced.   Neurological:      General: No focal deficit present.      Mental Status: She is alert and oriented to person, place, and time.   Psychiatric:         Mood and Affect: Mood normal.         Behavior: Behavior normal.          Fluids    Intake/Output Summary (Last 24 hours) at 9/16/2023 1115  Last data filed at 9/15/2023 2200  Gross per 24 hour   Intake --   Output 400 ml   Net -400 ml       Laboratory  Recent Labs     09/15/23  1309 09/16/23 0223   WBC 6.0 3.9*   RBC 3.60* 3.25*   HEMOGLOBIN 11.2* 10.0*   HEMATOCRIT 34.9* 32.0*   MCV 96.9 98.5*   MCH 31.1 30.8   MCHC 32.1* 31.3*   RDW 48.4 49.3   PLATELETCT 671* 575*   MPV 10.7 10.6     Recent Labs     09/14/23  0215 09/15/23  1309 09/16/23 0223   SODIUM 140 144 144   POTASSIUM 4.1 3.8 3.7   CHLORIDE 107 105 108   CO2 24 22 23   GLUCOSE 127* 92 97   BUN 2* 2* 2*   CREATININE 0.55 0.60 0.57   CALCIUM 8.5 10.2 9.1                   Imaging  [id number] VIEW   Final Result      1.  Unremarkable 2 views of the abdomen.           Assessment/Plan  * Nausea and vomiting- (present on admission)  Assessment & Plan  Normal lipase  Covid positive  Could be related to chronic cholecystitis  Antiemetics prn  IV fluids  Diet as tolerates  Omeprazole    Weakness- (present on admission)  Assessment & Plan  PT OT    COVID-19 virus infection- (present on admission)  Assessment & Plan  Currently not requiring supplemental oxygen  Contact/eye/droplet precautions in place  Supportive care        Substance abuse (HCC)- (present on admission)  Assessment & Plan  Alcohol abuse with kwesi on prior admit  Check UDS  Avoid narcotics as able.    Chronic cholecystitis- (present on admission)  Assessment & Plan  WBC normal.  She is suppose to follow up outpatient with a surgeon in [place] for elective cholecystectomy  Surgery Dr Alberts recommends no acute surgical intervention or cholecystostomy tube placement at this time    Hypomagnesemia- (present on admission)  Assessment & Plan  Giving 4 gram IV MgSO4  Diet as tolerates    Crohn's disease (HCC)- (present on admission)  Assessment & Plan  Follow up with GI outpatient           VTE prophylaxis: Lovenox    I have performed a physical exam and  reviewed and updated ROS and Plan today (9/16/2023). In review of yesterday's note (9/15/2023), there are no changes except as documented above.      I spent 51 minutes, reviewing the chart, obtaining and/or reviewing separately obtained history. Performing a medically appropriate examination and evaluation.  Counseling and educating the patient. Ordering and reviewing medications, tests, or procedures.  Documenting clinical information in EPIC. Independently interpreting results and communicating results to patient. Discussing future disposition of care with patient, RN and case management.

## 2023-09-16 NOTE — PROGRESS NOTES
Checked on patient, lethargic and difficult to arouse again. Pinpoint pupils. Vitals stable, put back on 1 L NC. MD notified.

## 2023-09-16 NOTE — PROGRESS NOTES
IV infiltrated and restarted. Meds given. Taking food and fluids well. Requesting pain meds. No PRN orders in placed. Requested from MD.

## 2023-09-16 NOTE — PROGRESS NOTES
Received report from day shift RN. Assumed patient care. Patient resting in bed, NAD, lethargic and difficult to arouse. Patient responds to pain but falls back asleep easily. Attempted to address questions and discuss POC, patient unable to stay awake.

## 2023-09-17 ENCOUNTER — PHARMACY VISIT (OUTPATIENT)
Dept: PHARMACY | Facility: MEDICAL CENTER | Age: 68
End: 2023-09-17
Payer: COMMERCIAL

## 2023-09-17 VITALS
TEMPERATURE: 98.1 F | DIASTOLIC BLOOD PRESSURE: 89 MMHG | OXYGEN SATURATION: 100 % | SYSTOLIC BLOOD PRESSURE: 155 MMHG | HEIGHT: 68 IN | BODY MASS INDEX: 18.71 KG/M2 | WEIGHT: 123.46 LBS | HEART RATE: 89 BPM | RESPIRATION RATE: 16 BRPM

## 2023-09-17 PROBLEM — R53.1 WEAKNESS: Status: RESOLVED | Noted: 2023-09-16 | Resolved: 2023-09-17

## 2023-09-17 PROBLEM — E83.42 HYPOMAGNESEMIA: Status: RESOLVED | Noted: 2023-08-28 | Resolved: 2023-09-17

## 2023-09-17 PROBLEM — R11.2 NAUSEA AND VOMITING: Status: RESOLVED | Noted: 2023-09-15 | Resolved: 2023-09-17

## 2023-09-17 LAB
ANION GAP SERPL CALC-SCNC: 10 MMOL/L (ref 7–16)
BUN SERPL-MCNC: 2 MG/DL (ref 8–22)
CALCIUM SERPL-MCNC: 8.8 MG/DL (ref 8.5–10.5)
CHLORIDE SERPL-SCNC: 107 MMOL/L (ref 96–112)
CO2 SERPL-SCNC: 27 MMOL/L (ref 20–33)
CREAT SERPL-MCNC: 0.54 MG/DL (ref 0.5–1.4)
ERYTHROCYTE [DISTWIDTH] IN BLOOD BY AUTOMATED COUNT: 48.7 FL (ref 35.9–50)
GFR SERPLBLD CREATININE-BSD FMLA CKD-EPI: 101 ML/MIN/1.73 M 2
GLUCOSE SERPL-MCNC: 111 MG/DL (ref 65–99)
HCT VFR BLD AUTO: 31.9 % (ref 37–47)
HGB BLD-MCNC: 10.1 G/DL (ref 12–16)
MCH RBC QN AUTO: 30.2 PG (ref 27–33)
MCHC RBC AUTO-ENTMCNC: 31.7 G/DL (ref 32.2–35.5)
MCV RBC AUTO: 95.5 FL (ref 81.4–97.8)
PLATELET # BLD AUTO: 583 K/UL (ref 164–446)
PMV BLD AUTO: 10.4 FL (ref 9–12.9)
POTASSIUM SERPL-SCNC: 3.2 MMOL/L (ref 3.6–5.5)
RBC # BLD AUTO: 3.34 M/UL (ref 4.2–5.4)
SODIUM SERPL-SCNC: 144 MMOL/L (ref 135–145)
WBC # BLD AUTO: 4.9 K/UL (ref 4.8–10.8)

## 2023-09-17 PROCEDURE — 700111 HCHG RX REV CODE 636 W/ 250 OVERRIDE (IP)

## 2023-09-17 PROCEDURE — A9270 NON-COVERED ITEM OR SERVICE: HCPCS | Performed by: INTERNAL MEDICINE

## 2023-09-17 PROCEDURE — 96376 TX/PRO/DX INJ SAME DRUG ADON: CPT

## 2023-09-17 PROCEDURE — 85027 COMPLETE CBC AUTOMATED: CPT

## 2023-09-17 PROCEDURE — 80048 BASIC METABOLIC PNL TOTAL CA: CPT

## 2023-09-17 PROCEDURE — A9270 NON-COVERED ITEM OR SERVICE: HCPCS | Performed by: HOSPITALIST

## 2023-09-17 PROCEDURE — 99239 HOSP IP/OBS DSCHRG MGMT >30: CPT | Performed by: INTERNAL MEDICINE

## 2023-09-17 PROCEDURE — RXMED WILLOW AMBULATORY MEDICATION CHARGE: Performed by: INTERNAL MEDICINE

## 2023-09-17 PROCEDURE — 700102 HCHG RX REV CODE 250 W/ 637 OVERRIDE(OP): Performed by: INTERNAL MEDICINE

## 2023-09-17 PROCEDURE — G0378 HOSPITAL OBSERVATION PER HR: HCPCS

## 2023-09-17 PROCEDURE — 700102 HCHG RX REV CODE 250 W/ 637 OVERRIDE(OP): Performed by: HOSPITALIST

## 2023-09-17 PROCEDURE — 97535 SELF CARE MNGMENT TRAINING: CPT

## 2023-09-17 PROCEDURE — 97162 PT EVAL MOD COMPLEX 30 MIN: CPT

## 2023-09-17 RX ORDER — HYDROMORPHONE HYDROCHLORIDE 2 MG/1
1 TABLET ORAL EVERY 4 HOURS PRN
Status: DISCONTINUED | OUTPATIENT
Start: 2023-09-17 | End: 2023-09-17

## 2023-09-17 RX ORDER — ONDANSETRON HYDROCHLORIDE 8 MG/1
8 TABLET, FILM COATED ORAL EVERY 8 HOURS PRN
Qty: 15 TABLET | Refills: 0 | Status: SHIPPED | OUTPATIENT
Start: 2023-09-17

## 2023-09-17 RX ORDER — HYDROMORPHONE HYDROCHLORIDE 2 MG/1
2 TABLET ORAL EVERY 6 HOURS PRN
Qty: 12 TABLET | Refills: 0 | Status: SHIPPED | OUTPATIENT
Start: 2023-09-17 | End: 2023-09-20

## 2023-09-17 RX ORDER — HYDROMORPHONE HYDROCHLORIDE 2 MG/1
2 TABLET ORAL EVERY 4 HOURS PRN
Status: DISCONTINUED | OUTPATIENT
Start: 2023-09-17 | End: 2023-09-17 | Stop reason: HOSPADM

## 2023-09-17 RX ORDER — POTASSIUM CHLORIDE 20 MEQ/1
40 TABLET, EXTENDED RELEASE ORAL ONCE
Status: COMPLETED | OUTPATIENT
Start: 2023-09-17 | End: 2023-09-17

## 2023-09-17 RX ORDER — MORPHINE SULFATE 4 MG/ML
2 INJECTION INTRAVENOUS
Status: DISCONTINUED | OUTPATIENT
Start: 2023-09-17 | End: 2023-09-17

## 2023-09-17 RX ORDER — TRAMADOL HYDROCHLORIDE 50 MG/1
50 TABLET ORAL EVERY 6 HOURS PRN
Status: DISCONTINUED | OUTPATIENT
Start: 2023-09-17 | End: 2023-09-17

## 2023-09-17 RX ADMIN — POTASSIUM CHLORIDE 40 MEQ: 1500 TABLET, EXTENDED RELEASE ORAL at 10:19

## 2023-09-17 RX ADMIN — DULOXETINE HYDROCHLORIDE 60 MG: 60 CAPSULE, DELAYED RELEASE ORAL at 04:12

## 2023-09-17 RX ADMIN — OMEPRAZOLE 40 MG: 20 CAPSULE, DELAYED RELEASE ORAL at 04:12

## 2023-09-17 RX ADMIN — GABAPENTIN 100 MG: 100 CAPSULE ORAL at 04:12

## 2023-09-17 RX ADMIN — MORPHINE SULFATE 2 MG: 4 INJECTION INTRAVENOUS at 01:48

## 2023-09-17 RX ADMIN — METOPROLOL SUCCINATE 25 MG: 25 TABLET, EXTENDED RELEASE ORAL at 04:11

## 2023-09-17 RX ADMIN — LEVETIRACETAM 500 MG: 500 TABLET, FILM COATED ORAL at 04:12

## 2023-09-17 RX ADMIN — ROSUVASTATIN CALCIUM 20 MG: 20 TABLET, FILM COATED ORAL at 04:11

## 2023-09-17 RX ADMIN — HYDROMORPHONE HYDROCHLORIDE 2 MG: 2 TABLET ORAL at 10:19

## 2023-09-17 ASSESSMENT — COGNITIVE AND FUNCTIONAL STATUS - GENERAL
MOBILITY SCORE: 24
SUGGESTED CMS G CODE MODIFIER MOBILITY: CH

## 2023-09-17 ASSESSMENT — GAIT ASSESSMENTS
GAIT LEVEL OF ASSIST: SUPERVISED
DISTANCE (FEET): 100

## 2023-09-17 ASSESSMENT — PAIN DESCRIPTION - PAIN TYPE
TYPE: ACUTE PAIN

## 2023-09-17 NOTE — CARE PLAN
The patient is Stable - Low risk of patient condition declining or worsening    Shift Goals  Clinical Goals: Maintain neuro status, no neuro changes  Patient Goals: treat her pain  Family Goals: alexander    Progress made toward(s) clinical / shift goals:  Pt lethargic after PRN pain med given. Hospitalist notified and adjusted pain medication. No changes in neuro after new pain med given.     Patient is not progressing towards the following goals:

## 2023-09-17 NOTE — PROGRESS NOTES
Bedside report received. POC reviewed with pt, chart and orders reviewed. COVID iso precautions in place. Assessment complete, pt is A&Ox4. Complaining of 10/10 pain. Discussed PRN pain meds available and frequencies ordered. Pt is on r/a with no signs of dyspnea. Up self and steady in room.

## 2023-09-17 NOTE — PROGRESS NOTES
IV dc'd.  Discharge instructions given to patient; patient verbalizes understanding, all questions answered.  Copy of DC summary provided, signed copy in chart.  New Medication provided to patient.  Pt states personal belongings are in possession.  Pt escorted off unit by tech without incident.

## 2023-09-17 NOTE — PROGRESS NOTES
Report received, pt care assumed. Pt A&Ox4. Pt instructed on use of call light. Call light and personal belongings within reach of pt.

## 2023-09-17 NOTE — THERAPY
"Physical Therapy   Initial Evaluation     Patient Name: Odalys Keenan  Age:  68 y.o., Sex:  female  Medical Record #: [id number]  Today's Date: 9/17/2023     Precautions  Precautions: Fall Risk  Comments: hx of ETOH W/D    Assessment  68-year-old female with a past medical history of Crohn's disease, C. difficile infection, DLD, hypertension, alcohol abuse who presented with nausea and vomiting.  She has had multiple recent visits to the hospital for concerns of cholecystitis for which she underwent a IR guided cholecystostomy tube placement on 8/28/2023.  Her most recent admission was on 9/9/2023 for right upper quadrant abdominal pain and vomiting.  She then became agitated secondary to alcohol withdrawal and pulled out her cholecystostomy tube.     Patient presents to PT eval with weakness 2/2 ETOH withdrawal and deconditioning from frequent hospital admits. She was able to ambulate in the room without an AD and no LOB.  She demos sufficient strength to ambulate community distances. No further acute care PT needs at this time.     Plan    Physical Therapy Initial Treatment Plan   Duration: Evaluation only    DC Equipment Recommendations: None  Discharge Recommendations: Anticipate that the patient will have no further physical therapy needs after discharge from the hospital       Subjective    \"I think I'm ready to go today\"     Objective       09/17/23 0945   Precautions   Precautions Fall Risk   Comments hx of ETOH W/D   Pain 0 - 10 Group   Therapist Pain Assessment 0;Post Activity Pain Same as Prior to Activity   Prior Living Situation   Prior Services None   Housing / Facility Motel   Equipment Owned Front-Wheel Walker   Lives with - Patient's Self Care Capacity Alone and Able to Care For Self   Comments Patient with a complicated history, but has been staying in a motel. her car is currently at the motel and she reports her keys and her phone are in the car.  Her plan is to drive back to [place] and get the " surgery she needs and then check herself into a drug rehab   Prior Level of Functional Mobility   Bed Mobility Independent   Transfer Status Independent   Ambulation Independent   Assistive Devices Used None   History of Falls   History of Falls Yes   Date of Last Fall   (reports a recent fall in her hotel room)   Cognition    Cognition / Consciousness WDL   Active ROM Upper Body   Active ROM Upper Body  WDL   Strength Upper Body   Upper Body Strength  WDL   Active ROM Lower Body    Active ROM Lower Body  WDL   Strength Lower Body   Lower Body Strength  X   Gross Strength Generalized Weakness, Equal Bilaterally   Comments grossly 4/5   Sensation Lower Body   Lower Extremity Sensation   WDL   Other Treatments   Other Treatments Provided Extensive time providing emotional support while patient described her recent issues with her social status and houseing. Educated about the need to self-pace   Balance Assessment   Sitting Balance (Static) Good   Sitting Balance (Dynamic) Fair +   Standing Balance (Static) Fair +   Standing Balance (Dynamic) Fair +   Weight Shift Sitting Fair   Weight Shift Standing Fair   Comments no AD   Bed Mobility    Supine to Sit Supervised   Sit to Supine Supervised   Scooting Supervised   Gait Analysis   Gait Level Of Assist Supervised   Assistive Device None   Distance (Feet) 100   # of Times Distance was Traveled 1   Comments distance limited by needing to stay in the room 2/2 Covid   Functional Mobility   Sit to Stand Supervised   Bed, Chair, Wheelchair Transfer Supervised   Toilet Transfers Supervised   ICU Target Mobility Level   ICU Mobility - Targeted Level Level 4   How much difficulty does the patient currently have...   Turning over in bed (including adjusting bedclothes, sheets and blankets)? 4   Sitting down on and standing up from a chair with arms (e.g., wheelchair, bedside commode, etc.) 4   Moving from lying on back to sitting on the side of the bed? 4   How much help from  another person does the patient currently need...   Moving to and from a bed to a chair (including a wheelchair)? 4   Need to walk in a hospital room? 4   Climbing 3-5 steps with a railing? 4   6 clicks Mobility Score 24   Education Group   Education Provided Role of Physical Therapist   Role of Physical Therapist Patient Response Patient;Acceptance;Explanation;Demonstration;Verbal Demonstration   Physical Therapy Initial Treatment Plan    Duration Evaluation only   Anticipated Discharge Equipment and Recommendations   DC Equipment Recommendations None   Discharge Recommendations Anticipate that the patient will have no further physical therapy needs after discharge from the hospital     Nicolle Acharya, PT, DPT, GCS

## 2023-09-17 NOTE — PROGRESS NOTES
Pt requested prn pain med for 10/10 pain. RN went to assess pt. Upon assessment pt was very lethargic and difficult to arouse. Unaware of the date, and falling asleep in conversation. RR 16, SPO2 95%. Discussed occurrence with Yovani Hunt. Norco order changed to tramadol, and discussed possible need to decrease morphine dose.

## 2023-09-18 NOTE — DISCHARGE SUMMARY
"Discharge Summary    CHIEF COMPLAINT ON ADMISSION  Chief Complaint   Patient presents with    Abdominal Pain     The pt was discharged from hospital yesterday for cholecystitis. The pt reports continued abd pain and N/V/D. Pain is 8/10, radiates to right abd, stabbing. The pt reports vomiting a \"bucket full' of bile last night       Reason for Admission  EMS     Admission Date  9/15/2023    CODE STATUS  Prior    HPI & HOSPITAL COURSE  68-year-old female with a past medical history of Crohn's disease, C. difficile infection, DLD, hypertension, alcohol abuse who presented with nausea and vomiting.  She has had multiple recent visits to the hospital for concerns of cholecystitis for which she underwent a IR guided cholecystostomy tube placement on 8/28/2023.  Her most recent admission was on 9/9/2023 for right upper quadrant abdominal pain and vomiting.  She then became agitated secondary to alcohol withdrawal and pulled out her cholecystostomy tube.  Surgery was consulted and patient was started on IV antibiotics.  She underwent a CT abdomen pelvis with IV contrast on 9/13/2023 demonstrated subcapsular fluid collection anterior to the medial segment left hepatic lobe which measures 2.1 x 5.9 cm, gallbladder is decompressed and does not demonstrate evidence of significant interval increase in pericholecystic or perihepatic fluid.  Redemonstrated cystic rectosigmoid colonic wall thickening which could be seen in the setting of infectious or inflammatory colitis.  CT reviewed by surgery Dr. Funk with no plans for acute surgical intervention with plans for patient to follow-up with a surgeon in [place] for elective surgery.  Patient was then discharged to a motel where she felt weak and suddenly developed large voluminous bilious vomiting and therefore presented back to the ER.  In the ER her vitals were stable.  She was afebrile without leukocytosis.  Patient was evaluated by general surgery Dr. Hermes Alberts who " thinks her abdominal pain is chronic and not related to an acute biliary pathology and did not recommend acute surgical intervention at this time.  Patient is to follow-up with surgeon in [place] to consider elective cholecystectomy.  Patient was given supportive care with IV fluid hydration, IV antiemetics and pain control with IV Dilaudid with improvement of her symptoms.  The next day she was able to tolerate a diet and was evaluated by PT OT and had no postacute needs.  She will be discharged home with close outpatient follow-up        Therefore, she is discharged in good and stable condition to home with close outpatient follow-up.    The patient recovered much more quickly than anticipated on admission.    Discharge Date  9/17/2023    FOLLOW UP ITEMS POST DISCHARGE  PCP and surgery    DISCHARGE DIAGNOSES  Principal Problem (Resolved):    Nausea and vomiting (POA: Yes)  Active Problems:    Dyslipidemia (POA: Yes)    Crohn's disease (HCC) (POA: Yes)    Chronic cholecystitis (POA: Yes)    Substance abuse (HCC) (POA: Yes)    COVID-19 virus infection (POA: Yes)  Resolved Problems:    Hypomagnesemia (POA: Yes)    Weakness (POA: Yes)      FOLLOW UP  No future appointments.  No follow-up provider specified.    MEDICATIONS ON DISCHARGE     Medication List        CHANGE how you take these medications        Instructions   HYDROmorphone 2 MG Tabs  What changed:   how much to take  when to take this  reasons to take this  Commonly known as: Dilaudid   Take 1 Tablet by mouth every 6 hours as needed for Severe Pain for up to 3 days.  Dose: 2 mg            CONTINUE taking these medications        Instructions   DULoxetine 60 MG Cpep delayed-release capsule  Commonly known as: Cymbalta   Take 60 mg by mouth every day.  Dose: 60 mg     gabapentin 100 MG Caps  Commonly known as: Neurontin   Take 1 Capsule by mouth 3 times a day.  Dose: 100 mg     Icy Hot extra-strength 10-30 % Crea topical cream   Apply 1 Application  topically 4 times a day as needed (to back or abdomen).  Dose: 1 Application     levETIRAcetam 500 MG Tabs  Commonly known as: Keppra   Take 1 Tablet by mouth 2 times a day.  Dose: 500 mg     lidocaine 5 % Ptch  Commonly known as: Lidoderm   Place 3 Patches on the skin every 24 hours.  Dose: 3 Patch     melatonin 5 mg Tabs   Take 10 mg by mouth at bedtime as needed (Sleep).  Dose: 10 mg     metoprolol SR 25 MG Tb24  Commonly known as: Toprol XL   Take 1 Tablet by mouth every day.  Dose: 25 mg     mirtazapine 15 MG Tabs  Commonly known as: Remeron   Take 2 Tablets by mouth at bedtime.  Dose: 30 mg     Naloxone 4 MG/0.1ML Liqd  Commonly known as: Narcan   Spray one actuation in one nostril for overdose. Call 911. May repeat in 2 minutes if needed.     omeprazole 40 MG delayed-release capsule  Commonly known as: PriLOSEC   Take 40 mg by mouth every day.  Dose: 40 mg     ondansetron 8 MG Tabs  Commonly known as: Zofran   Take 1 Tablet by mouth every 8 hours as needed for Nausea/Vomiting.  Dose: 8 mg     rosuvastatin 20 MG Tabs  Commonly known as: Crestor   Take 20 mg by mouth every day.  Dose: 20 mg              Allergies  Allergies   Allergen Reactions    Nsaids Vomiting       DIET  No orders of the defined types were placed in this encounter.      ACTIVITY  As tolerated.  Weight bearing as tolerated    CONSULTATIONS  General Surgery Dr Alberts    PROCEDURES  none    LABORATORY  Lab Results   Component Value Date    SODIUM 144 09/17/2023    POTASSIUM 3.2 (L) 09/17/2023    CHLORIDE 107 09/17/2023    CO2 27 09/17/2023    GLUCOSE 111 (H) 09/17/2023    BUN 2 (L) 09/17/2023    CREATININE 0.54 09/17/2023        Lab Results   Component Value Date    WBC 4.9 09/17/2023    HEMOGLOBIN 10.1 (L) 09/17/2023    HEMATOCRIT 31.9 (L) 09/17/2023    PLATELETCT 583 (H) 09/17/2023        Total time of the discharge process exceeds 31 minutes.